# Patient Record
Sex: FEMALE | Race: BLACK OR AFRICAN AMERICAN | HISPANIC OR LATINO | Employment: UNEMPLOYED | URBAN - METROPOLITAN AREA
[De-identification: names, ages, dates, MRNs, and addresses within clinical notes are randomized per-mention and may not be internally consistent; named-entity substitution may affect disease eponyms.]

---

## 2017-05-16 ENCOUNTER — APPOINTMENT (OUTPATIENT)
Dept: PHYSICAL THERAPY | Facility: REHABILITATION | Age: 27
End: 2017-05-16
Payer: COMMERCIAL

## 2017-05-16 PROCEDURE — 97110 THERAPEUTIC EXERCISES: CPT

## 2017-05-16 PROCEDURE — 97162 PT EVAL MOD COMPLEX 30 MIN: CPT

## 2017-05-22 ENCOUNTER — HOSPITAL ENCOUNTER (OUTPATIENT)
Dept: RADIOLOGY | Facility: HOSPITAL | Age: 27
Discharge: HOME | End: 2017-05-22
Attending: PODIATRIST
Payer: COMMERCIAL

## 2017-05-22 ENCOUNTER — APPOINTMENT (OUTPATIENT)
Dept: PHYSICAL THERAPY | Facility: REHABILITATION | Age: 27
End: 2017-05-22
Payer: COMMERCIAL

## 2017-05-22 ENCOUNTER — TRANSCRIBE ORDERS (OUTPATIENT)
Dept: ADMINISTRATIVE | Facility: HOSPITAL | Age: 27
End: 2017-05-22

## 2017-05-22 DIAGNOSIS — M79.674 PAIN IN TOE OF RIGHT FOOT: ICD-10-CM

## 2017-05-22 DIAGNOSIS — M79.674 PAIN IN TOE OF RIGHT FOOT: Primary | ICD-10-CM

## 2017-05-22 PROCEDURE — 73630 X-RAY EXAM OF FOOT: CPT

## 2017-05-22 PROCEDURE — 73660 X-RAY EXAM OF TOE(S): CPT

## 2017-05-25 ENCOUNTER — APPOINTMENT (OUTPATIENT)
Dept: PHYSICAL THERAPY | Facility: REHABILITATION | Age: 27
End: 2017-05-25
Payer: COMMERCIAL

## 2017-05-30 ENCOUNTER — APPOINTMENT (OUTPATIENT)
Dept: PHYSICAL THERAPY | Facility: REHABILITATION | Age: 27
End: 2017-05-30
Payer: COMMERCIAL

## 2018-03-15 ENCOUNTER — OFFICE VISIT (OUTPATIENT)
Dept: OBGYN CLINIC | Facility: CLINIC | Age: 28
End: 2018-03-15
Payer: COMMERCIAL

## 2018-03-15 VITALS
HEIGHT: 62 IN | WEIGHT: 180 LBS | DIASTOLIC BLOOD PRESSURE: 78 MMHG | BODY MASS INDEX: 33.13 KG/M2 | SYSTOLIC BLOOD PRESSURE: 122 MMHG

## 2018-03-15 DIAGNOSIS — E04.9 ENLARGED THYROID: ICD-10-CM

## 2018-03-15 DIAGNOSIS — Z11.3 SCREENING EXAMINATION FOR VENEREAL DISEASE: ICD-10-CM

## 2018-03-15 DIAGNOSIS — N92.6 IRREGULAR MENSES: ICD-10-CM

## 2018-03-15 DIAGNOSIS — Z01.419 ENCOUNTER FOR GYNECOLOGICAL EXAMINATION: Primary | ICD-10-CM

## 2018-03-15 PROCEDURE — 99395 PREV VISIT EST AGE 18-39: CPT | Performed by: PHYSICIAN ASSISTANT

## 2018-03-15 RX ORDER — CLONAZEPAM 1 MG/1
1 TABLET ORAL 3 TIMES DAILY
Refills: 0 | COMMUNITY
Start: 2018-02-06 | End: 2019-08-01

## 2018-03-15 RX ORDER — LITHIUM CARBONATE 300 MG/1
300 CAPSULE ORAL 3 TIMES DAILY
Refills: 0 | COMMUNITY
Start: 2018-02-06 | End: 2019-08-01

## 2018-03-15 RX ORDER — ALBUTEROL SULFATE 2.5 MG/3ML
SOLUTION RESPIRATORY (INHALATION)
Refills: 0 | COMMUNITY
Start: 2018-02-20

## 2018-03-15 RX ORDER — NORETHINDRONE ACETATE AND ETHINYL ESTRADIOL 1MG-20(21)
1 KIT ORAL DAILY
Qty: 28 TABLET | Refills: 3 | Status: SHIPPED | OUTPATIENT
Start: 2018-03-15 | End: 2018-09-04 | Stop reason: SDUPTHER

## 2018-03-15 NOTE — PROGRESS NOTES
Assessment/Plan:       Problem List Items Addressed This Visit     Enlarged thyroid     Script for thyroid ultrasound given  Relevant Orders    US thyroid    Encounter for gynecological examination - Primary     Pap guidelines reviewed  PAP with reflex and STD testing done today  Script for HIV, Hep B & C and RPR given  Return to office in 3 months for pill check  Relevant Orders    Thinprep Tis Pap Reflex HPV mRNA E6/E7, Chlamydia/N gonorrhoeae    Irregular menses     Reviewed options with patient for irregular bleeding  Recommend switching to pill in which she gets menses monthly  Reviewed birth control options including OCP, NuvaRing, Patch, Depo, Nexplanon  Patient decided on OCP  Reviewed when to start, what to do if misses pill  Recommend using condoms for the 1st month on the pill, if misses more than 2 pills in the pack, if on antibiotics and for STD prevention  Reviewed common side effects of the pill including nausea, vomiting, breast pain, bloating, fatigue, mood swings, weight gain, and increased acne  Reassured side effects typically diminish in the first month or two on the pill  Reviewed clotting risk and signs and symptoms of pulmonary embolism, DVT, myocardial infarction, stroke  Will trial Loestrin Fe 1/20  Return to office in 3 months for pill check  Relevant Medications    norethindrone-ethinyl estradiol (JUNEL FE 1/20) 1-20 MG-MCG per tablet      Other Visit Diagnoses     Screening examination for venereal disease        Relevant Orders    HIV 1/2 AG-AB combo    Hepatitis C antibody    Hepatitis B surface antigen    RPR    Thinprep Tis Pap Reflex HPV mRNA E6/E7, Chlamydia/N gonorrhoeae            Subjective:      Patient ID: Manasa Wong is a 33 y o  y o  female  HPI  34 yo seen for annual exam  Currently on Ashlyna 91 day OCP reports menses very irregular  Has bleeding about 45 days in that lasts 5-20 days and very heavy at times   Having irregular spotting throughout  Reports also more nauseated on this pill  Denies headaches, mood swings  Denies bowel or bladder issues  Prior to starting this pill patient was on Mirena but was worsening her anxiety  Reports anxiety better on this pill but cannot tolerate the bleeding  Reports thyroid and blood count tested recently by PCP  TFTs normal and patient reports anemic  Last pap: 11/2014 NILM   Monogamous with same partner, would like STD testing today  The following portions of the patient's history were reviewed and updated as appropriate:   She  has no past medical history on file  She   Patient Active Problem List    Diagnosis Date Noted    Enlarged thyroid 03/15/2018    Encounter for gynecological examination 03/15/2018    Irregular menses 03/15/2018    Acute pelvic pain, female 12/04/2015    Asthma 10/21/2015    Uterine fibroid in antepartum period 10/24/2014     She  has a past surgical history that includes Tooth extraction (2005)  Her family history includes Diabetes in her father and paternal grandfather; Liver cancer in her maternal grandmother  She  reports that she has never smoked  She has never used smokeless tobacco  She reports that she drinks alcohol  She reports that she does not use drugs    Current Outpatient Prescriptions   Medication Sig Dispense Refill    albuterol (2 5 mg/3 mL) 0 083 % nebulizer solution inhale contents of 1 vial in nebulizer every 4 hours if needed for wheezing  0    Calcium Carb-Cholecalciferol (CALCIUM + D3) 600-200 MG-UNIT TABS Take 1 tablet by mouth 2 (two) times a day  0    clonazePAM (KlonoPIN) 1 mg tablet Take 1 mg by mouth 3 (three) times a day  0    lithium carbonate 300 mg capsule Take 300 mg by mouth 3 (three) times a day  0    norethindrone-ethinyl estradiol (JUNEL FE 1/20) 1-20 MG-MCG per tablet Take 1 tablet by mouth daily 28 tablet 3    VENTOLIN  (90 Base) MCG/ACT inhaler inhale 2 puff by inhalation route  every 8 hours as needed  0     No current facility-administered medications for this visit  She is allergic to pollen extract       Menstrual History:  OB History      Para Term  AB Living    2 1 1   1 1    SAB TAB Ectopic Multiple Live Births    1       1        Obstetric Comments    MENARCHE- AGE 14         Menarche age: 14  Patient's last menstrual period was 2018 (exact date)  Period Duration (Days): 5-20  Period Pattern: (!) Irregular  Menstrual Flow: Moderate, Heavy, Light  Dysmenorrhea: None    Review of Systems   Constitutional: Negative for fatigue, fever and unexpected weight change  HENT: Negative for dental problem and sinus pressure  Eyes: Negative for visual disturbance  Respiratory: Negative for cough, shortness of breath and wheezing  Cardiovascular: Negative for chest pain  Gastrointestinal: Negative for abdominal pain, blood in stool, constipation, diarrhea, nausea and vomiting  Endocrine: Negative for polydipsia  Genitourinary: Negative for difficulty urinating, dyspareunia, dysuria, frequency, hematuria, pelvic pain and urgency  Musculoskeletal: Negative for arthralgias and back pain  Neurological: Negative for dizziness, seizures, light-headedness and headaches  Psychiatric/Behavioral: Negative for suicidal ideas  The patient is not nervous/anxious  Objective:     Physical Exam   Constitutional: She is oriented to person, place, and time  She appears well-developed and well-nourished  Genitourinary: Vagina normal and uterus normal  There is no rash, tenderness, lesion, injury or Bartholin's cyst on the right labia  There is no rash, tenderness, lesion, injury or Bartholin's cyst on the left labia  Vagina exhibits no lesion  No erythema, tenderness or bleeding in the vagina  No signs of injury around the vagina  No vaginal discharge found  Right adnexum does not display mass, does not display tenderness and does not display fullness   Left adnexum does not display mass, does not display tenderness and does not display fullness  Cervix does not exhibit motion tenderness, lesion or discharge  Uterus is not enlarged, tender, exhibiting a mass, irregular (is regular) or mobile  HENT:   Head: Normocephalic and atraumatic  Neck: Thyromegaly (diffuse) present  Cardiovascular: Normal rate, regular rhythm and normal heart sounds  Exam reveals no gallop and no friction rub  No murmur heard  Pulmonary/Chest: Effort normal and breath sounds normal  No respiratory distress  She has no wheezes  Right breast exhibits no inverted nipple, no mass, no nipple discharge, no skin change and no tenderness  Left breast exhibits no inverted nipple, no mass, no nipple discharge, no skin change and no tenderness  Breasts are symmetrical  There is no breast swelling  Abdominal: Soft  She exhibits no distension and no mass  There is no tenderness  There is no rebound and no guarding  No hernia  Lymphadenopathy:     She has no cervical adenopathy  Right: No inguinal adenopathy present  Left: No inguinal adenopathy present  Neurological: She is alert and oriented to person, place, and time  Skin: Skin is warm and dry  Psychiatric: She has a normal mood and affect   Her behavior is normal

## 2018-03-15 NOTE — ASSESSMENT & PLAN NOTE
Pap guidelines reviewed  PAP with reflex and STD testing done today  Script for HIV, Hep B & C and RPR given  Return to office in 3 months for pill check

## 2018-03-15 NOTE — ASSESSMENT & PLAN NOTE
Reviewed options with patient for irregular bleeding  Recommend switching to pill in which she gets menses monthly  Reviewed birth control options including OCP, NuvaRing, Patch, Depo, Nexplanon  Patient decided on OCP  Reviewed when to start, what to do if misses pill  Recommend using condoms for the 1st month on the pill, if misses more than 2 pills in the pack, if on antibiotics and for STD prevention  Reviewed common side effects of the pill including nausea, vomiting, breast pain, bloating, fatigue, mood swings, weight gain, and increased acne  Reassured side effects typically diminish in the first month or two on the pill  Reviewed clotting risk and signs and symptoms of pulmonary embolism, DVT, myocardial infarction, stroke  Will trial Loestrin Fe 1/20  Return to office in 3 months for pill check

## 2018-03-19 LAB
C TRACH RRNA SPEC QL NAA+PROBE: NOT DETECTED
CLINICAL INFO: NORMAL
CYTO CVX: NORMAL
CYTOLOGY CMNT CVX/VAG CYTO-IMP: NORMAL
DATE PREVIOUS BX: NORMAL
LMP START DATE: NORMAL
N GONORRHOEA RRNA SPEC QL NAA+PROBE: NOT DETECTED
SL AMB PREV. PAP:: NORMAL
SPECIMEN SOURCE CVX/VAG CYTO: NORMAL

## 2018-04-17 ENCOUNTER — HOSPITAL ENCOUNTER (OUTPATIENT)
Dept: ULTRASOUND IMAGING | Facility: HOSPITAL | Age: 28
Discharge: HOME | End: 2018-04-17
Attending: PHYSICIAN ASSISTANT
Payer: COMMERCIAL

## 2018-04-17 DIAGNOSIS — E04.9 ENLARGED THYROID: ICD-10-CM

## 2018-04-17 PROCEDURE — 76536 US EXAM OF HEAD AND NECK: CPT

## 2018-09-04 DIAGNOSIS — N92.6 IRREGULAR MENSES: ICD-10-CM

## 2018-09-04 NOTE — TELEPHONE ENCOUNTER
Patient no showed to 3 month pill check  If doing well and had recent BP record measurement and will refill

## 2018-09-05 RX ORDER — NORETHINDRONE ACETATE AND ETHINYL ESTRADIOL AND FERROUS FUMARATE 1MG-20(21)
1 KIT ORAL DAILY
Qty: 90 TABLET | Refills: 2 | Status: SHIPPED | OUTPATIENT
Start: 2018-09-05 | End: 2019-05-19 | Stop reason: SDUPTHER

## 2019-05-19 DIAGNOSIS — N92.6 IRREGULAR MENSES: ICD-10-CM

## 2019-05-20 RX ORDER — NORETHINDRONE ACETATE AND ETHINYL ESTRADIOL AND FERROUS FUMARATE 1MG-20(21)
KIT ORAL
Qty: 84 TABLET | Refills: 0 | Status: SHIPPED | OUTPATIENT
Start: 2019-05-20 | End: 2019-08-01 | Stop reason: SDUPTHER

## 2019-06-21 ENCOUNTER — TELEPHONE (OUTPATIENT)
Dept: OBGYN CLINIC | Facility: CLINIC | Age: 29
End: 2019-06-21

## 2019-08-01 ENCOUNTER — ANNUAL EXAM (OUTPATIENT)
Dept: OBGYN CLINIC | Facility: CLINIC | Age: 29
End: 2019-08-01
Payer: COMMERCIAL

## 2019-08-01 VITALS
BODY MASS INDEX: 35.85 KG/M2 | SYSTOLIC BLOOD PRESSURE: 118 MMHG | DIASTOLIC BLOOD PRESSURE: 70 MMHG | WEIGHT: 194.8 LBS | HEIGHT: 62 IN

## 2019-08-01 DIAGNOSIS — Z01.419 ENCOUNTER FOR GYNECOLOGICAL EXAMINATION WITHOUT ABNORMAL FINDING: Primary | ICD-10-CM

## 2019-08-01 DIAGNOSIS — N92.6 IRREGULAR MENSES: ICD-10-CM

## 2019-08-01 DIAGNOSIS — Z11.3 SCREENING EXAMINATION FOR VENEREAL DISEASE: ICD-10-CM

## 2019-08-01 PROCEDURE — 99395 PREV VISIT EST AGE 18-39: CPT | Performed by: PHYSICIAN ASSISTANT

## 2019-08-01 RX ORDER — EPINEPHRINE 0.3 MG/.3ML
INJECTION SUBCUTANEOUS
Refills: 0 | COMMUNITY
Start: 2019-05-23

## 2019-08-01 RX ORDER — FAMOTIDINE 20 MG/1
20 TABLET, FILM COATED ORAL
Refills: 0 | COMMUNITY
Start: 2019-05-17 | End: 2019-08-01

## 2019-08-01 RX ORDER — NORETHINDRONE ACETATE AND ETHINYL ESTRADIOL 1MG-20(21)
1 KIT ORAL DAILY
Qty: 84 TABLET | Refills: 3 | Status: SHIPPED | OUTPATIENT
Start: 2019-08-01 | End: 2020-06-29 | Stop reason: SDUPTHER

## 2019-08-01 RX ORDER — TOPIRAMATE 25 MG/1
CAPSULE, COATED PELLETS ORAL
Refills: 0 | COMMUNITY
Start: 2019-04-25 | End: 2019-08-01

## 2019-08-01 RX ORDER — HYDROXYZINE HYDROCHLORIDE 25 MG/1
TABLET, FILM COATED ORAL
Refills: 0 | COMMUNITY
Start: 2019-05-17 | End: 2019-08-01

## 2019-08-01 NOTE — PROGRESS NOTES
Assessment/Plan   Problem List Items Addressed This Visit        Other    Irregular menses    Relevant Medications    norethindrone-ethinyl estradiol (JUNEL FE 1/20) 1-20 MG-MCG per tablet    Encounter for gynecological examination without abnormal finding - Primary     Pap guidelines reviewed  Pap with HPV done today  STD cultures done today  Script for STD blood work given  Will plan to continue Junel 1/20  Return to office for annual or as needed  Relevant Orders    Thinprep Tis Pap, HPV mRNA E6/E7 Rfx HPV 16,18/45, Chlamydia/N gonorrhoeae      Other Visit Diagnoses     Screening examination for venereal disease        Relevant Orders    Thinprep Tis Pap, HPV mRNA E6/E7 Rfx HPV 16,18/45, Chlamydia/N gonorrhoeae    Hepatitis B surface antigen    HIV 1/2 AG-AB combo    Hepatitis C antibody    RPR          Subjective:     Patient ID: Hector Akhtar is a 26 y o  y o  female  HPI  25 yo seen for annual exam  Currently on Junel 1/20 tolerating well would like to continue  Denies bowel or bladder issues  Last pap: 3/15/2018 NILM   The following portions of the patient's history were reviewed and updated as appropriate:   She  has no past medical history on file  She   Patient Active Problem List    Diagnosis Date Noted    Encounter for gynecological examination without abnormal finding 08/01/2019    Enlarged thyroid 03/15/2018    Encounter for gynecological examination 03/15/2018    Irregular menses 03/15/2018    Acute pelvic pain, female 12/04/2015    Asthma 10/21/2015    Uterine fibroid in antepartum period 10/24/2014     She  has a past surgical history that includes Tooth extraction (2005)  Her family history includes Diabetes in her father and paternal grandfather; Liver cancer in her maternal grandmother  She  reports that she has never smoked  She has never used smokeless tobacco  She reports that she drinks alcohol  She reports that she does not use drugs    Current Outpatient Medications   Medication Sig Dispense Refill    albuterol (2 5 mg/3 mL) 0 083 % nebulizer solution inhale contents of 1 vial in nebulizer every 4 hours if needed for wheezing  0    EPINEPHrine (EPIPEN) 0 3 mg/0 3 mL SOAJ USE ASE DIRECTED BY MD  0    norethindrone-ethinyl estradiol (JUNEL FE 1/20) 1-20 MG-MCG per tablet Take 1 tablet by mouth daily 84 tablet 3    VENTOLIN  (90 Base) MCG/ACT inhaler inhale 2 puff by inhalation route  every 8 hours as needed  0     No current facility-administered medications for this visit  She is allergic to grapeseed extract [fish oil] and pollen extract       Menstrual History:  OB History        2    Para   1    Term   1            AB   1    Living   1       SAB   1    TAB        Ectopic        Multiple        Live Births   1           Obstetric Comments   MENARCHE- AGE 14            Menarche age: 14  Patient's last menstrual period was 2019 (exact date)  Period Cycle (Days): 28  Period Duration (Days): 3-7  Period Pattern: Regular  Menstrual Flow: Moderate, Light  Dysmenorrhea: None      Review of Systems   Constitutional: Negative for fatigue, fever and unexpected weight change  HENT: Negative for dental problem and sinus pressure  Eyes: Negative for visual disturbance  Respiratory: Negative for cough, shortness of breath and wheezing  Cardiovascular: Negative for chest pain  Gastrointestinal: Negative for abdominal pain, blood in stool, constipation, diarrhea, nausea and vomiting  Endocrine: Negative for polydipsia  Genitourinary: Negative for difficulty urinating, dyspareunia, dysuria, frequency, hematuria, pelvic pain and urgency  Musculoskeletal: Negative for arthralgias and back pain  Neurological: Negative for dizziness, seizures, light-headedness and headaches  Psychiatric/Behavioral: Negative for suicidal ideas  The patient is not nervous/anxious          Objective:  Vitals:    19 1537   BP: 118/70   BP Location: Left arm   Patient Position: Sitting   Cuff Size: Large   Weight: 88 4 kg (194 lb 12 8 oz)   Height: 5' 2" (1 575 m)      Physical Exam   Constitutional: She is oriented to person, place, and time  She appears well-developed and well-nourished  Genitourinary: Vagina normal and uterus normal  There is no rash, tenderness, lesion, injury or Bartholin's cyst on the right labia  There is no rash, tenderness, lesion, injury or Bartholin's cyst on the left labia  Vagina exhibits no lesion  No erythema, tenderness or bleeding in the vagina  No signs of injury around the vagina  No vaginal discharge found  Right adnexum does not display mass, does not display tenderness and does not display fullness  Left adnexum does not display mass, does not display tenderness and does not display fullness  Cervix does not exhibit motion tenderness, lesion or discharge  Uterus is not enlarged, tender, exhibiting a mass, irregular (is regular) or mobile  HENT:   Head: Normocephalic and atraumatic  Neck: No thyromegaly present  Cardiovascular: Normal rate, regular rhythm and normal heart sounds  Exam reveals no gallop and no friction rub  No murmur heard  Pulmonary/Chest: Effort normal and breath sounds normal  No respiratory distress  She has no wheezes  Right breast exhibits no inverted nipple, no mass, no nipple discharge, no skin change and no tenderness  Left breast exhibits no inverted nipple, no mass, no nipple discharge, no skin change and no tenderness  No breast swelling  Breasts are symmetrical    Abdominal: Soft  She exhibits no distension and no mass  There is no tenderness  There is no rebound and no guarding  No hernia  Lymphadenopathy:     She has no cervical adenopathy  Right: No inguinal adenopathy present  Left: No inguinal adenopathy present  Neurological: She is alert and oriented to person, place, and time  Skin: Skin is warm and dry     Psychiatric: She has a normal mood and affect   Her behavior is normal

## 2019-08-01 NOTE — ASSESSMENT & PLAN NOTE
Pap guidelines reviewed  Pap with HPV done today  STD cultures done today  Script for STD blood work given  Will plan to continue Junel 1/20  Return to office for annual or as needed

## 2019-08-06 LAB
C TRACH RRNA SPEC QL NAA+PROBE: NOT DETECTED
CLINICAL INFO: NORMAL
CYTO CVX: NORMAL
CYTOLOGY CMNT CVX/VAG CYTO-IMP: NORMAL
DATE PREVIOUS BX: NORMAL
HPV E6+E7 MRNA CVX QL NAA+PROBE: NOT DETECTED
LMP START DATE: NORMAL
N GONORRHOEA RRNA SPEC QL NAA+PROBE: NOT DETECTED
SL AMB PREV. PAP:: NORMAL
SPECIMEN SOURCE CVX/VAG CYTO: NORMAL

## 2020-06-29 ENCOUNTER — TELEPHONE (OUTPATIENT)
Dept: OBGYN CLINIC | Facility: CLINIC | Age: 30
End: 2020-06-29

## 2020-06-29 DIAGNOSIS — N92.6 IRREGULAR MENSES: ICD-10-CM

## 2020-06-29 RX ORDER — NORETHINDRONE ACETATE AND ETHINYL ESTRADIOL 1MG-20(21)
1 KIT ORAL DAILY
Qty: 84 TABLET | Refills: 0 | Status: SHIPPED | OUTPATIENT
Start: 2020-06-29 | End: 2020-10-06

## 2020-09-02 ENCOUNTER — ANNUAL EXAM (OUTPATIENT)
Dept: OBGYN CLINIC | Facility: CLINIC | Age: 30
End: 2020-09-02
Payer: COMMERCIAL

## 2020-09-02 VITALS
BODY MASS INDEX: 36.8 KG/M2 | WEIGHT: 200 LBS | DIASTOLIC BLOOD PRESSURE: 72 MMHG | HEIGHT: 62 IN | TEMPERATURE: 97.7 F | SYSTOLIC BLOOD PRESSURE: 120 MMHG

## 2020-09-02 DIAGNOSIS — Z01.419 ENCOUNTER FOR GYNECOLOGICAL EXAMINATION WITHOUT ABNORMAL FINDING: Primary | ICD-10-CM

## 2020-09-02 DIAGNOSIS — N92.6 IRREGULAR MENSES: ICD-10-CM

## 2020-09-02 DIAGNOSIS — Z13.220 SCREENING FOR CHOLESTEROL LEVEL: ICD-10-CM

## 2020-09-02 DIAGNOSIS — R63.5 WEIGHT GAIN: ICD-10-CM

## 2020-09-02 DIAGNOSIS — R53.83 FATIGUE, UNSPECIFIED TYPE: ICD-10-CM

## 2020-09-02 PROCEDURE — 99395 PREV VISIT EST AGE 18-39: CPT | Performed by: PHYSICIAN ASSISTANT

## 2020-09-02 NOTE — ASSESSMENT & PLAN NOTE
Pap guidelines reviewed  Pap deferred secondary to negative pap and HPV in 2019 in this low risk patient  Reviewed irregular cycles in length with patient  Will plan to get blood work to further evaluate  If blood work normal will plan to switch to Cryselle OCP to attempt to better regulate cycles  Office will call with results and appropriate follow-up  Return to office for annual or as needed

## 2020-09-02 NOTE — PROGRESS NOTES
Assessment/Plan   Problem List Items Addressed This Visit        Other    Irregular menses    Relevant Orders    T4, free    TSH, 3rd generation    CBC and differential    Comprehensive metabolic panel    Hemoglobin A1C    Insulin, fasting    Encounter for gynecological examination without abnormal finding - Primary     Pap guidelines reviewed  Pap deferred secondary to negative pap and HPV in 2019 in this low risk patient  Reviewed irregular cycles in length with patient  Will plan to get blood work to further evaluate  If blood work normal will plan to switch to Cryselle OCP to attempt to better regulate cycles  Office will call with results and appropriate follow-up  Return to office for annual or as needed  Other Visit Diagnoses     Weight gain        Relevant Orders    T4, free    TSH, 3rd generation    CBC and differential    Comprehensive metabolic panel    Hemoglobin A1C    Insulin, fasting    Lipid panel    Fatigue, unspecified type        Relevant Orders    T4, free    TSH, 3rd generation    CBC and differential    Comprehensive metabolic panel    Screening for cholesterol level        Relevant Orders    Lipid panel          Subjective:     Patient ID: Giselle Scott is a 31 y o  y o  female  HPI  39-year-old seen for annual exam   Patient currently on Junel 1/20, menses have been irregular over the past few months, reports one month only had a day of bleeding and then the next month bled for 14 days straight  Has also had menses twice in one month, one during blank week and one at another time  Reports takes pill at the same time every day, is never late on a pill or misses a pill  Denies bowel or bladder issues  Has been on a no carb diet for 6 5 weeks and has only lost about 4 5 lbs  Does not understand with how strictly she has been following the diet why she is not losing weight  Patient reports feeling more fatigued as well     Patient has visible exophthalmos and appearance of enlarged thyroid  Thyroid blood work always normal and thyroid ultrasound done on 2018 was normal    Last pap: 2019 NILM (-)HRHPV  The following portions of the patient's history were reviewed and updated as appropriate:   She  has no past medical history on file  She   Patient Active Problem List    Diagnosis Date Noted    Encounter for gynecological examination without abnormal finding 2019    Enlarged thyroid 03/15/2018    Encounter for gynecological examination 03/15/2018    Irregular menses 03/15/2018    Acute pelvic pain, female 2015    Asthma 10/21/2015    Uterine fibroid in antepartum period 10/24/2014     She  has a past surgical history that includes Tooth extraction ()  Her family history includes Diabetes in her father and paternal grandfather; Liver cancer in her maternal grandmother  She  reports that she has never smoked  She has never used smokeless tobacco  She reports current alcohol use  She reports that she does not use drugs  Current Outpatient Medications   Medication Sig Dispense Refill    albuterol (2 5 mg/3 mL) 0 083 % nebulizer solution inhale contents of 1 vial in nebulizer every 4 hours if needed for wheezing  0    EPINEPHrine (EPIPEN) 0 3 mg/0 3 mL SOAJ USE ASE DIRECTED BY MD  0    norethindrone-ethinyl estradiol (Junel ) 1-20 MG-MCG per tablet Take 1 tablet by mouth daily 84 tablet 0    VENTOLIN  (90 Base) MCG/ACT inhaler inhale 2 puff by inhalation route  every 8 hours as needed  0     No current facility-administered medications for this visit  She is allergic to grapeseed extract [fish oil] and pollen extract       Menstrual History:  OB History        2    Para   1    Term   1            AB   1    Living   1       SAB   1    TAB        Ectopic        Multiple        Live Births   1           Obstetric Comments   MENARCHE- AGE 11              Patient's last menstrual period was 2020 (exact date)  Review of Systems   Constitutional: Negative for fatigue, fever and unexpected weight change  HENT: Negative for dental problem and sinus pressure  Eyes: Negative for visual disturbance  Respiratory: Negative for cough, shortness of breath and wheezing  Cardiovascular: Negative for chest pain  Gastrointestinal: Negative for abdominal pain, blood in stool, constipation, diarrhea, nausea and vomiting  Endocrine: Negative for polydipsia  Genitourinary: Negative for difficulty urinating, dyspareunia, dysuria, frequency, hematuria, pelvic pain and urgency  Musculoskeletal: Negative for arthralgias and back pain  Neurological: Negative for dizziness, seizures, light-headedness and headaches  Psychiatric/Behavioral: Negative for suicidal ideas  The patient is not nervous/anxious  Objective:  Vitals:    09/02/20 1631   BP: 120/72   BP Location: Left arm   Patient Position: Sitting   Cuff Size: Standard   Temp: 97 7 °F (36 5 °C)   Weight: 90 7 kg (200 lb)   Height: 5' 2" (1 575 m)      Physical Exam  Constitutional:       Appearance: Normal appearance  She is well-developed  Genitourinary:      Vulva, urethra, bladder, vagina and uterus normal       No vulval condylomata, lesion, tenderness, ulcerations, Bartholin's cyst or rash noted  No signs of labial injury  No labial fusion  No inguinal adenopathy present in the right or left side  No urethral prolapse, pain, swelling, tenderness, caruncle, mass or diverticulum present  Bladder is not distended or tender  No signs of injury or lesions in the vagina  No vaginal discharge, erythema, tenderness or bleeding  No cervical motion tenderness, discharge or lesion  Uterus is not enlarged, tender, irregular or mobile  No uterine mass detected  No right or left adnexal mass present  Right adnexa not tender or full  Left adnexa not tender or full     HENT:      Head: Normocephalic and atraumatic  Neck:      Thyroid: No thyromegaly  Cardiovascular:      Rate and Rhythm: Normal rate and regular rhythm  Heart sounds: Normal heart sounds  No murmur  No friction rub  No gallop  Pulmonary:      Effort: Pulmonary effort is normal  No respiratory distress  Breath sounds: Normal breath sounds  No wheezing  Chest:      Breasts: Breasts are symmetrical          Right: Normal  No swelling, bleeding, inverted nipple, mass, nipple discharge, skin change or tenderness  Left: Normal  No swelling, bleeding, inverted nipple, mass, nipple discharge, skin change or tenderness  Abdominal:      General: There is no distension  Palpations: Abdomen is soft  There is no mass  Tenderness: There is no abdominal tenderness  There is no guarding or rebound  Hernia: No hernia is present  Lymphadenopathy:      Cervical: No cervical adenopathy  Upper Body:      Right upper body: No supraclavicular, axillary or pectoral adenopathy  Left upper body: No supraclavicular, axillary or pectoral adenopathy  Lower Body: No right inguinal adenopathy  No left inguinal adenopathy  Neurological:      Mental Status: She is alert and oriented to person, place, and time  Skin:     General: Skin is warm and dry     Psychiatric:         Behavior: Behavior normal

## 2020-09-18 LAB
ALBUMIN SERPL-MCNC: 4.1 G/DL (ref 3.6–5.1)
ALBUMIN/GLOB SERPL: 1.3 (CALC) (ref 1–2.5)
ALP SERPL-CCNC: 100 U/L (ref 31–125)
ALT SERPL-CCNC: 10 U/L (ref 6–29)
AST SERPL-CCNC: 11 U/L (ref 10–30)
BASOPHILS # BLD AUTO: 19 CELLS/UL (ref 0–200)
BASOPHILS NFR BLD AUTO: 0.2 %
BILIRUB SERPL-MCNC: 0.4 MG/DL (ref 0.2–1.2)
BUN SERPL-MCNC: 12 MG/DL (ref 7–25)
BUN/CREAT SERPL: NORMAL (CALC) (ref 6–22)
CALCIUM SERPL-MCNC: 9 MG/DL (ref 8.6–10.2)
CHLORIDE SERPL-SCNC: 103 MMOL/L (ref 98–110)
CHOLEST SERPL-MCNC: 163 MG/DL
CHOLEST/HDLC SERPL: 3.1 (CALC)
CO2 SERPL-SCNC: 25 MMOL/L (ref 20–32)
CREAT SERPL-MCNC: 0.82 MG/DL (ref 0.5–1.1)
EOSINOPHIL # BLD AUTO: 171 CELLS/UL (ref 15–500)
EOSINOPHIL NFR BLD AUTO: 1.8 %
ERYTHROCYTE [DISTWIDTH] IN BLOOD BY AUTOMATED COUNT: 12.3 % (ref 11–15)
GLOBULIN SER CALC-MCNC: 3.1 G/DL (CALC) (ref 1.9–3.7)
GLUCOSE SERPL-MCNC: 88 MG/DL (ref 65–99)
HBA1C MFR BLD: 5.1 % OF TOTAL HGB
HCT VFR BLD AUTO: 40 % (ref 35–45)
HDLC SERPL-MCNC: 53 MG/DL
HGB BLD-MCNC: 13.2 G/DL (ref 11.7–15.5)
INSULIN SERPL-ACNC: 9.7 UIU/ML
LDLC SERPL CALC-MCNC: 85 MG/DL (CALC)
LYMPHOCYTES # BLD AUTO: 3154 CELLS/UL (ref 850–3900)
LYMPHOCYTES NFR BLD AUTO: 33.2 %
MCH RBC QN AUTO: 29.8 PG (ref 27–33)
MCHC RBC AUTO-ENTMCNC: 33 G/DL (ref 32–36)
MCV RBC AUTO: 90.3 FL (ref 80–100)
MONOCYTES # BLD AUTO: 523 CELLS/UL (ref 200–950)
MONOCYTES NFR BLD AUTO: 5.5 %
NEUTROPHILS # BLD AUTO: 5634 CELLS/UL (ref 1500–7800)
NEUTROPHILS NFR BLD AUTO: 59.3 %
NONHDLC SERPL-MCNC: 110 MG/DL (CALC)
PLATELET # BLD AUTO: 203 THOUSAND/UL (ref 140–400)
PMV BLD REES-ECKER: 11 FL (ref 7.5–12.5)
POTASSIUM SERPL-SCNC: 4 MMOL/L (ref 3.5–5.3)
PROT SERPL-MCNC: 7.2 G/DL (ref 6.1–8.1)
RBC # BLD AUTO: 4.43 MILLION/UL (ref 3.8–5.1)
SL AMB EGFR AFRICAN AMERICAN: 111 ML/MIN/1.73M2
SL AMB EGFR NON AFRICAN AMERICAN: 95 ML/MIN/1.73M2
SODIUM SERPL-SCNC: 138 MMOL/L (ref 135–146)
T4 FREE SERPL-MCNC: 1.2 NG/DL (ref 0.8–1.8)
TRIGL SERPL-MCNC: 149 MG/DL
TSH SERPL-ACNC: 1.82 MIU/L
WBC # BLD AUTO: 9.5 THOUSAND/UL (ref 3.8–10.8)

## 2020-09-22 DIAGNOSIS — N92.6 IRREGULAR MENSES: Primary | ICD-10-CM

## 2020-10-06 DIAGNOSIS — N92.6 IRREGULAR MENSES: ICD-10-CM

## 2020-10-06 RX ORDER — NORETHINDRONE ACETATE/ETHINYL ESTRADIOL AND FERROUS FUMARATE 1MG-20(21)
KIT ORAL
Qty: 84 TABLET | Refills: 0 | Status: SHIPPED | OUTPATIENT
Start: 2020-10-06 | End: 2021-01-06

## 2021-01-06 DIAGNOSIS — N92.6 IRREGULAR MENSES: ICD-10-CM

## 2021-01-06 RX ORDER — NORETHINDRONE ACETATE/ETHINYL ESTRADIOL AND FERROUS FUMARATE 1MG-20(21)
KIT ORAL
Qty: 84 TABLET | Refills: 0 | Status: SHIPPED | OUTPATIENT
Start: 2021-01-06 | End: 2021-03-29

## 2021-03-28 DIAGNOSIS — N92.6 IRREGULAR MENSES: ICD-10-CM

## 2021-03-29 RX ORDER — NORETHINDRONE ACETATE/ETHINYL ESTRADIOL AND FERROUS FUMARATE 1MG-20(21)
KIT ORAL
Qty: 84 TABLET | Refills: 0 | Status: SHIPPED | OUTPATIENT
Start: 2021-03-29 | End: 2021-06-30

## 2022-02-08 ENCOUNTER — ANNUAL EXAM (OUTPATIENT)
Dept: OBGYN CLINIC | Facility: CLINIC | Age: 32
End: 2022-02-08
Payer: COMMERCIAL

## 2022-02-08 VITALS
SYSTOLIC BLOOD PRESSURE: 120 MMHG | WEIGHT: 203 LBS | DIASTOLIC BLOOD PRESSURE: 80 MMHG | BODY MASS INDEX: 37.36 KG/M2 | HEIGHT: 62 IN

## 2022-02-08 DIAGNOSIS — L90.0 LICHEN SCLEROSUS: ICD-10-CM

## 2022-02-08 DIAGNOSIS — N94.6 DYSMENORRHEA: ICD-10-CM

## 2022-02-08 DIAGNOSIS — Z01.419 GYNECOLOGIC EXAM NORMAL: Primary | ICD-10-CM

## 2022-02-08 DIAGNOSIS — R63.5 WEIGHT GAIN: ICD-10-CM

## 2022-02-08 DIAGNOSIS — N92.6 IRREGULAR MENSES: ICD-10-CM

## 2022-02-08 PROCEDURE — 99395 PREV VISIT EST AGE 18-39: CPT | Performed by: PHYSICIAN ASSISTANT

## 2022-02-08 PROCEDURE — 0503F POSTPARTUM CARE VISIT: CPT | Performed by: PHYSICIAN ASSISTANT

## 2022-02-08 RX ORDER — DIPHENOXYLATE HYDROCHLORIDE AND ATROPINE SULFATE 2.5; .025 MG/1; MG/1
1 TABLET ORAL DAILY
COMMUNITY
End: 2022-02-22

## 2022-02-08 RX ORDER — CLOBETASOL PROPIONATE 0.5 MG/G
OINTMENT TOPICAL SEE ADMIN INSTRUCTIONS
Qty: 45 G | Refills: 0 | Status: SHIPPED | OUTPATIENT
Start: 2022-02-08 | End: 2022-02-22

## 2022-02-08 NOTE — PATIENT INSTRUCTIONS
Lichen sclerosus  Recommend clobetasol ointment  Use thin layer on affected area three times daily until symptoms improve than decrease to twice daily for a few days then once daily for a few days  In between flares can use hydrocortisone cream over the counter and/or coconut oil

## 2022-02-08 NOTE — PROGRESS NOTES
Assessment/Plan   Problem List Items Addressed This Visit        Other    Irregular menses     Pap guidelines reviewed  Pap deferred secondary to negative pap and HPV in 2019 in this low risk patient  Reviewed irregular painful menses with patient  Will plan blood work and pelvic ultrasound to further evaluate  Office will call with results and appropriate follow up  Patient would like to continue this pill for now while obtaining work up  Reviewed suspicion of Lichen sclerosus based on exam findings and intermittent vulvar itching  Recommend clobetasol ointment  Use thin layer on affected area three times daily until symptoms improve than decrease to twice daily for a few days then once daily for a few days  In between flares can use hydrocortisone cream over the counter and/or coconut oil  ]  Return to office for annual or as needed  Relevant Medications    norgestrel-ethinyl estradiol (LO/OVRAL) 0 3 mg-30 mcg per tablet    Other Relevant Orders    US pelvis complete w transvaginal    TSH, 3rd generation    T4, free    CBC and differential    Gynecologic exam normal - Primary     Pap guidelines reviewed  Other Visit Diagnoses     Dysmenorrhea        Relevant Medications    norgestrel-ethinyl estradiol (LO/OVRAL) 0 3 mg-30 mcg per tablet    Other Relevant Orders    US pelvis complete w transvaginal    TSH, 3rd generation    T4, free    CBC and differential    Weight gain        Relevant Orders    TSH, 3rd generation    T4, free    Comprehensive metabolic panel    Insulin, fasting    Hemoglobin D2P    Lichen sclerosus        Relevant Medications    clobetasol (TEMOVATE) 0 05 % ointment          Subjective:     Patient ID: Audi Sierra is a 34 y o  y o  female  HPI  35 yo seen for annual exam  Currently on Lo Ovral OCP  Reports has been having some breakthrough bleeding  Experiencing more cramping  Occurring every menses  First 2 days  Does get heavy pads changing every 2-3 hours   Hx of uterine fibroid found during pregnancy, no follow up ultrasounds  Patient has also been struggling with the inability to lose weight  Has tried dietary modifications and exercise very strict for 5-6 months with only 15 lb weight loss  Has seen weight loss specialist in the past and started on medications that worked but caused her to have side effects  Denies bowel or bladder issues  Does reports vulvar itching that comes and goes, mostly around clitoral area and in creases of labia  Patient has appearance of exophthalmos and enlarged thyroid but thyroid blood work and thyroid ultrasound always normal     Last pap: 8/1/2019 NILM (-)HRHPV  The following portions of the patient's history were reviewed and updated as appropriate:   She  has no past medical history on file  She   Patient Active Problem List    Diagnosis Date Noted    Gynecologic exam normal 02/08/2022    Encounter for gynecological examination without abnormal finding 08/01/2019    Enlarged thyroid 03/15/2018    Encounter for gynecological examination 03/15/2018    Irregular menses 03/15/2018    Acute pelvic pain, female 12/04/2015    Asthma 10/21/2015    Uterine fibroid in antepartum period 10/24/2014     She  has a past surgical history that includes Tooth extraction (2005)  Her family history includes Diabetes in her father and paternal grandfather; Liver cancer in her maternal grandmother  She  reports that she has never smoked  She has never used smokeless tobacco  She reports current alcohol use  She reports current drug use  Drug: Marijuana    Current Outpatient Medications   Medication Sig Dispense Refill    albuterol (2 5 mg/3 mL) 0 083 % nebulizer solution inhale contents of 1 vial in nebulizer every 4 hours if needed for wheezing  0    EPINEPHrine (EPIPEN) 0 3 mg/0 3 mL SOAJ USE ASE DIRECTED BY MD  0    multivitamin (THERAGRAN) TABS Take 1 tablet by mouth daily      norgestrel-ethinyl estradiol (LO/OVRAL) 0 3 mg-30 mcg per tablet Take 1 tablet by mouth daily 90 tablet 3    VENTOLIN  (90 Base) MCG/ACT inhaler inhale 2 puff by inhalation route  every 8 hours as needed  0    clobetasol (TEMOVATE) 0 05 % ointment Apply topically see administration instructions Apply thin layer TID to affected area until symptoms improve than decrease to BID for a few days and then daily for a few days  45 g 0     No current facility-administered medications for this visit  She is allergic to grapeseed extract [fish oil - food allergy] and pollen extract       Menstrual History:  OB History        2    Para   1    Term   1            AB   1    Living   1       SAB   1    IAB        Ectopic        Multiple        Live Births   1           Obstetric Comments   MENARCHE- AGE 11              Patient's last menstrual period was 2022  Review of Systems   Constitutional: Negative for fatigue, fever and unexpected weight change  HENT: Negative for dental problem and sinus pressure  Eyes: Negative for visual disturbance  Respiratory: Negative for cough, shortness of breath and wheezing  Cardiovascular: Negative for chest pain  Gastrointestinal: Negative for abdominal pain, blood in stool, constipation, diarrhea, nausea and vomiting  Endocrine: Negative for polydipsia  Genitourinary: Negative for difficulty urinating, dyspareunia, dysuria, frequency, hematuria, pelvic pain and urgency  Musculoskeletal: Negative for arthralgias and back pain  Neurological: Negative for dizziness, seizures, light-headedness and headaches  Psychiatric/Behavioral: Negative for suicidal ideas  The patient is not nervous/anxious  Objective:  Vitals:    22 1030   BP: 120/80   BP Location: Left arm   Patient Position: Sitting   Cuff Size: Large   Weight: 92 1 kg (203 lb)   Height: 5' 2" (1 575 m)      Physical Exam  Constitutional:       Appearance: Normal appearance  She is well-developed     Genitourinary: Vulva and bladder normal       No lesions in the vagina  Right Labia: No rash, tenderness, lesions or skin changes  Left Labia: No tenderness, lesions, skin changes or rash  No labial fusion noted  No inguinal adenopathy present in the right or left side  No vaginal discharge, erythema, tenderness or bleeding  Right Adnexa: not tender, not full and no mass present  Left Adnexa: not tender, not full and no mass present  No cervical motion tenderness, discharge or lesion  Uterus is not enlarged, tender or irregular  No uterine mass detected  No urethral prolapse, tenderness or mass present  Bladder is not tender  Breasts: Breasts are symmetrical       Right: No swelling, bleeding, inverted nipple, mass, nipple discharge, skin change, tenderness, axillary adenopathy or supraclavicular adenopathy  Left: No swelling, bleeding, inverted nipple, mass, nipple discharge, skin change, tenderness, axillary adenopathy or supraclavicular adenopathy  HENT:      Head: Normocephalic and atraumatic  Neck:      Thyroid: No thyromegaly  Cardiovascular:      Rate and Rhythm: Normal rate and regular rhythm  Heart sounds: Normal heart sounds  No murmur heard  No friction rub  No gallop  Pulmonary:      Effort: Pulmonary effort is normal  No respiratory distress  Breath sounds: Normal breath sounds  No wheezing  Abdominal:      General: There is no distension  Palpations: Abdomen is soft  There is no mass  Tenderness: There is no abdominal tenderness  There is no guarding or rebound  Hernia: No hernia is present  Lymphadenopathy:      Cervical: No cervical adenopathy  Upper Body:      Right upper body: No supraclavicular, axillary or pectoral adenopathy  Left upper body: No supraclavicular, axillary or pectoral adenopathy  Lower Body: No right inguinal adenopathy  No left inguinal adenopathy     Neurological: Mental Status: She is alert and oriented to person, place, and time  Skin:     General: Skin is warm and dry     Psychiatric:         Behavior: Behavior normal

## 2022-02-09 NOTE — ASSESSMENT & PLAN NOTE
Pap guidelines reviewed  Pap deferred secondary to negative pap and HPV in 2019 in this low risk patient  Reviewed irregular painful menses with patient  Will plan blood work and pelvic ultrasound to further evaluate  Office will call with results and appropriate follow up  Patient would like to continue this pill for now while obtaining work up  Reviewed suspicion of Lichen sclerosus based on exam findings and intermittent vulvar itching  Recommend clobetasol ointment  Use thin layer on affected area three times daily until symptoms improve than decrease to twice daily for a few days then once daily for a few days  In between flares can use hydrocortisone cream over the counter and/or coconut oil  ]  Return to office for annual or as needed

## 2022-02-11 ENCOUNTER — APPOINTMENT (OUTPATIENT)
Dept: LAB | Facility: HOSPITAL | Age: 32
End: 2022-02-11
Payer: COMMERCIAL

## 2022-02-11 DIAGNOSIS — N94.6 DYSMENORRHEA: ICD-10-CM

## 2022-02-11 DIAGNOSIS — N92.6 IRREGULAR MENSES: ICD-10-CM

## 2022-02-11 DIAGNOSIS — R63.5 WEIGHT GAIN: ICD-10-CM

## 2022-02-11 LAB
ALBUMIN SERPL BCP-MCNC: 4 G/DL (ref 3.4–4.8)
ALP SERPL-CCNC: 77.3 U/L (ref 35–140)
ALT SERPL W P-5'-P-CCNC: 12 U/L (ref 5–54)
ANION GAP SERPL CALCULATED.3IONS-SCNC: 7 MMOL/L (ref 4–13)
AST SERPL W P-5'-P-CCNC: 11 U/L (ref 15–41)
BASOPHILS # BLD AUTO: 0.04 THOUSANDS/ΜL (ref 0–0.1)
BASOPHILS NFR BLD AUTO: 1 % (ref 0–1)
BILIRUB SERPL-MCNC: 0.37 MG/DL (ref 0.3–1.2)
BUN SERPL-MCNC: 10 MG/DL (ref 6–20)
CALCIUM SERPL-MCNC: 8.5 MG/DL (ref 8.4–10.2)
CHLORIDE SERPL-SCNC: 109 MMOL/L (ref 96–108)
CO2 SERPL-SCNC: 23 MMOL/L (ref 22–33)
CREAT SERPL-MCNC: 0.88 MG/DL (ref 0.4–1.1)
EOSINOPHIL # BLD AUTO: 0.23 THOUSAND/ΜL (ref 0–0.61)
EOSINOPHIL NFR BLD AUTO: 3 % (ref 0–6)
ERYTHROCYTE [DISTWIDTH] IN BLOOD BY AUTOMATED COUNT: 12.8 % (ref 11.6–15.1)
EST. AVERAGE GLUCOSE BLD GHB EST-MCNC: 100 MG/DL
GFR SERPL CREATININE-BSD FRML MDRD: 86 ML/MIN/1.73SQ M
GLUCOSE P FAST SERPL-MCNC: 126 MG/DL (ref 70–105)
HBA1C MFR BLD: 5.1 %
HCT VFR BLD AUTO: 39.6 % (ref 34.8–46.1)
HGB BLD-MCNC: 12.6 G/DL (ref 11.5–15.4)
IMM GRANULOCYTES # BLD AUTO: 0.02 THOUSAND/UL (ref 0–0.2)
IMM GRANULOCYTES NFR BLD AUTO: 0 % (ref 0–2)
INSULIN SERPL-ACNC: 26.4 MU/L (ref 3–25)
LYMPHOCYTES # BLD AUTO: 3.58 THOUSANDS/ΜL (ref 0.6–4.47)
LYMPHOCYTES NFR BLD AUTO: 41 % (ref 14–44)
MCH RBC QN AUTO: 29 PG (ref 26.8–34.3)
MCHC RBC AUTO-ENTMCNC: 31.8 G/DL (ref 31.4–37.4)
MCV RBC AUTO: 91 FL (ref 82–98)
MONOCYTES # BLD AUTO: 0.61 THOUSAND/ΜL (ref 0.17–1.22)
MONOCYTES NFR BLD AUTO: 7 % (ref 4–12)
NEUTROPHILS # BLD AUTO: 4.37 THOUSANDS/ΜL (ref 1.85–7.62)
NEUTS SEG NFR BLD AUTO: 48 % (ref 43–75)
NRBC BLD AUTO-RTO: 0 /100 WBCS
PLATELET # BLD AUTO: 196 THOUSANDS/UL (ref 149–390)
PMV BLD AUTO: 10.9 FL (ref 8.9–12.7)
POTASSIUM SERPL-SCNC: 4.1 MMOL/L (ref 3.5–5)
PROT SERPL-MCNC: 7.3 G/DL (ref 6.4–8.3)
RBC # BLD AUTO: 4.35 MILLION/UL (ref 3.81–5.12)
SODIUM SERPL-SCNC: 139 MMOL/L (ref 133–145)
T4 FREE SERPL-MCNC: 1.07 NG/DL (ref 0.76–1.46)
TSH SERPL DL<=0.05 MIU/L-ACNC: 3.55 UIU/ML (ref 0.34–5.6)
WBC # BLD AUTO: 8.85 THOUSAND/UL (ref 4.31–10.16)

## 2022-02-11 PROCEDURE — 83036 HEMOGLOBIN GLYCOSYLATED A1C: CPT

## 2022-02-11 PROCEDURE — 36415 COLL VENOUS BLD VENIPUNCTURE: CPT

## 2022-02-11 PROCEDURE — 84439 ASSAY OF FREE THYROXINE: CPT

## 2022-02-11 PROCEDURE — 85025 COMPLETE CBC W/AUTO DIFF WBC: CPT

## 2022-02-11 PROCEDURE — 84443 ASSAY THYROID STIM HORMONE: CPT

## 2022-02-11 PROCEDURE — 80053 COMPREHEN METABOLIC PANEL: CPT

## 2022-02-11 PROCEDURE — 83525 ASSAY OF INSULIN: CPT

## 2022-02-15 ENCOUNTER — HOSPITAL ENCOUNTER (OUTPATIENT)
Dept: ULTRASOUND IMAGING | Facility: HOSPITAL | Age: 32
Discharge: HOME | End: 2022-02-15
Payer: COMMERCIAL

## 2022-02-15 ENCOUNTER — TELEPHONE (OUTPATIENT)
Dept: OBGYN CLINIC | Facility: CLINIC | Age: 32
End: 2022-02-15

## 2022-02-15 DIAGNOSIS — N92.6 IRREGULAR MENSES: ICD-10-CM

## 2022-02-15 DIAGNOSIS — N94.6 DYSMENORRHEA: ICD-10-CM

## 2022-02-15 PROCEDURE — 76856 US EXAM PELVIC COMPLETE: CPT

## 2022-02-15 PROCEDURE — 76830 TRANSVAGINAL US NON-OB: CPT

## 2022-02-24 DIAGNOSIS — N92.6 IRREGULAR MENSES: Primary | ICD-10-CM

## 2022-02-24 RX ORDER — NORGESTIMATE AND ETHINYL ESTRADIOL 7DAYSX3 28
1 KIT ORAL DAILY
Qty: 28 TABLET | Refills: 2 | Status: SHIPPED | OUTPATIENT
Start: 2022-02-24 | End: 2022-05-17

## 2022-02-25 ENCOUNTER — TELEPHONE (OUTPATIENT)
Dept: OTHER | Facility: OTHER | Age: 32
End: 2022-02-25

## 2022-02-25 NOTE — TELEPHONE ENCOUNTER
Please call the patient in regard to her appointment for today @ 10:30  Patient states she has been calling since last night to change the appointment to virtual but has been unsuccessful  Please advise if another appointment could be offered maybe later today

## 2022-03-14 ENCOUNTER — OFFICE VISIT (OUTPATIENT)
Dept: FAMILY MEDICINE CLINIC | Facility: CLINIC | Age: 32
End: 2022-03-14
Payer: COMMERCIAL

## 2022-03-14 VITALS
HEART RATE: 88 BPM | OXYGEN SATURATION: 99 % | WEIGHT: 202 LBS | RESPIRATION RATE: 16 BRPM | HEIGHT: 62 IN | BODY MASS INDEX: 37.17 KG/M2 | TEMPERATURE: 97.2 F

## 2022-03-14 DIAGNOSIS — Z00.00 ANNUAL PHYSICAL EXAM: Primary | ICD-10-CM

## 2022-03-14 DIAGNOSIS — R73.01 IMPAIRED FASTING GLUCOSE: ICD-10-CM

## 2022-03-14 DIAGNOSIS — Z11.59 NEED FOR HEPATITIS C SCREENING TEST: ICD-10-CM

## 2022-03-14 DIAGNOSIS — Z88.9 HISTORY OF ALLERGIC REACTION: ICD-10-CM

## 2022-03-14 DIAGNOSIS — Z11.4 ENCOUNTER FOR SCREENING FOR HIV: ICD-10-CM

## 2022-03-14 DIAGNOSIS — Z76.89 ENCOUNTER TO ESTABLISH CARE WITH NEW DOCTOR: ICD-10-CM

## 2022-03-14 DIAGNOSIS — E66.9 CLASS 2 OBESITY WITH BODY MASS INDEX (BMI) OF 36.0 TO 36.9 IN ADULT, UNSPECIFIED OBESITY TYPE, UNSPECIFIED WHETHER SERIOUS COMORBIDITY PRESENT: ICD-10-CM

## 2022-03-14 PROBLEM — M54.50 CHRONIC BILATERAL LOW BACK PAIN: Status: ACTIVE | Noted: 2022-03-14

## 2022-03-14 PROBLEM — E04.9 ENLARGED THYROID: Status: RESOLVED | Noted: 2018-03-15 | Resolved: 2022-03-14

## 2022-03-14 PROBLEM — G89.29 CHRONIC BILATERAL LOW BACK PAIN: Status: ACTIVE | Noted: 2022-03-14

## 2022-03-14 PROBLEM — E66.812 CLASS 2 OBESITY WITH BODY MASS INDEX (BMI) OF 36.0 TO 36.9 IN ADULT: Status: ACTIVE | Noted: 2022-03-14

## 2022-03-14 PROCEDURE — 99385 PREV VISIT NEW AGE 18-39: CPT | Performed by: INTERNAL MEDICINE

## 2022-03-14 RX ORDER — PHENTERMINE HYDROCHLORIDE 37.5 MG/1
37.5 TABLET ORAL DAILY
Qty: 30 TABLET | Refills: 3 | Status: SHIPPED | OUTPATIENT
Start: 2022-03-14 | End: 2022-05-28 | Stop reason: SDUPTHER

## 2022-03-14 NOTE — PROGRESS NOTES
[facility]    NAME: Nikolai Tineo  AGE: 34 y o  SEX: female  : 1990     DATE: 3/14/2022     Assessment and Plan:     Problem List Items Addressed This Visit        Other    Class 2 obesity with body mass index (BMI) of 36 0 to 36 9 in adult     Discussed at length with patient-she'd been on topamax for awhile and had a terrible reaction to it -we discussed diet, exercise program, using metformin (has a fbs of 126 but A1c is 5 1%) and phenterimine and discussed all side effects-will refer to bariatric program-check labs         Relevant Medications    phentermine (Adipex-P) 37 5 MG tablet    metFORMIN (GLUCOPHAGE) 500 mg tablet    Other Relevant Orders    Ambulatory Referral to Bariatric Surgery      Other Visit Diagnoses     Annual physical exam    -  Primary    Relevant Orders    CBC and differential    Comprehensive metabolic panel    Lipid panel    TSH, 3rd generation    Hepatitis C antibody    HIV 1/2 Antigen/Antibody (4th Generation) w Reflex [facility]    Encounter to establish care with new doctor        Encounter for screening for HIV        Relevant Orders    HIV 1/2 Antigen/Antibody (4th Generation) w Reflex [facility]    Need for hepatitis C screening test        Relevant Orders    Hepatitis C antibody    History of allergic reaction        Relevant Orders    Ambulatory Referral to Allergy    Impaired fasting glucose        Relevant Medications    metFORMIN (GLUCOPHAGE) 500 mg tablet    Other Relevant Orders    Ambulatory Referral to Bariatric Surgery          Immunizations and preventive care screenings were discussed with patient today  Appropriate education was printed on patient's after visit summary  Counseling:  Alcohol/drug use: discussed moderation in alcohol intake, the recommendations for healthy alcohol use, and avoidance of illicit drug use    Dental Health: discussed importance of regular tooth brushing, flossing, and dental visits  · Exercise: the importance of regular exercise/physical activity was discussed  Recommend exercise 3-5 times per week for at least 30 minutes  No follow-ups on file  Chief Complaint:     Chief Complaint   Patient presents with   [facility] Patient Visit    Establish Care    Physical Exam      History of Present Illness:     Adult Annual Physical   Patient here for a comprehensive physical exam  The patient reports problems - weight gain  Diet and Physical Activity  · Diet/Nutrition: well balanced diet  · Exercise: 1-2 times a week on average  Depression Screening  PHQ-2/9 Depression Screening    Little interest or pleasure in doing things: 1 - several days  Feeling down, depressed, or hopeless: 1 - several days  PHQ-2 Score: 2  PHQ-2 Interpretation: Negative depression screen       General Health  · Sleep: sleeps well  · Hearing: normal - bilateral   · Vision: wears glasses  · Dental: regular dental visits  /GYN Health  · Last menstrual period: irregular  · Contraceptive method: oral contraceptives  · History of STDs?: no      Review of Systems:     Review of Systems   Constitutional: Positive for unexpected weight change  Cardiovascular: Negative  Musculoskeletal: Positive for arthralgias and back pain  Neurological: Positive for numbness  Hematological: Negative  Psychiatric/Behavioral: Positive for dysphoric mood        Past Medical History:     Past Medical History:   Diagnosis Date    Asthma     Enlarged thyroid     Uterine fibroid       Past Surgical History:     Past Surgical History:   Procedure Laterality Date    TOOTH EXTRACTION  2005      Social History:     Social History     Socioeconomic History    Marital status: Single     Spouse name: None    Number of children: None    Years of education: None    Highest education level: None   Occupational History    Occupation: [employer]    Tobacco Use    Smoking status: Never Smoker    Smokeless tobacco: Never Used   Vaping Use    Vaping Use: Never used   Substance and Sexual Activity    Alcohol use: Yes     Comment: SOCIAL     Drug use: Yes     Types: Marijuana    Sexual activity: Yes     Partners: Male     Birth control/protection: OCP   Other Topics Concern    None   Social History Narrative    None     Social Determinants of Health     Financial Resource Strain: Not on file   Food Insecurity: Not on file   Transportation Needs: Not on file   Physical Activity: Not on file   Stress: Not on file   Social Connections: Not on file   Intimate Partner Violence: Not on file   Housing Stability: Not on file      Family History:     Family History   Problem Relation Age of Onset    No Known Problems Mother     Diabetes Father     Hypertension Father     Liver cancer Maternal Grandmother     Mental illness Maternal Grandmother     Hypertension Paternal Grandmother     Diabetes Paternal Grandfather     Asthma Paternal Aunt     Asthma Paternal Uncle       Current Medications:     Current Outpatient Medications   Medication Sig Dispense Refill    albuterol (2 5 mg/3 mL) 0 083 % nebulizer solution inhale contents of 1 vial in nebulizer every 4 hours if needed for wheezing  0    EPINEPHrine (EPIPEN) 0 3 mg/0 3 mL SOAJ USE ASE DIRECTED BY MD  0    norgestimate-ethinyl estradiol (Ortho Tri-Cyclen, 28,) 0 18/0 215/0 25 MG-35 MCG per tablet Take 1 tablet by mouth daily 28 tablet 2    VENTOLIN  (90 Base) MCG/ACT inhaler inhale 2 puff by inhalation route  every 8 hours as needed  0    metFORMIN (GLUCOPHAGE) 500 mg tablet Take 1 tablet daily with largest meal 30 tablet 5    phentermine (Adipex-P) 37 5 MG tablet Take 1 tablet (37 5 mg total) by mouth in the morning 30 tablet 3     No current facility-administered medications for this visit  Allergies:      Allergies   Allergen Reactions    Grape (Artificial) Flavor - Food Allergy Anaphylaxis, Hives, Itching and Shortness Of Breath    Pollen Extract       Physical Exam:     Pulse 88   Temp (!) 97 2 °F (36 2 °C) (Temporal)   Resp 16   Ht 5' 2" (1 575 m)   Wt 91 6 kg (202 lb)   SpO2 99%   BMI 36 95 kg/m²     Physical Exam     Bony Mike MD   [facility]

## 2022-03-14 NOTE — PATIENT INSTRUCTIONS

## 2022-03-14 NOTE — ASSESSMENT & PLAN NOTE
Discussed at length with patient-she'd been on topamax for awhile and had a terrible reaction to it -we discussed diet, exercise program, using metformin (has a fbs of 126 but A1c is 5 1%) and phenterimine and discussed all side effects-will refer to bariatric program-check labs

## 2022-03-21 ENCOUNTER — TELEPHONE (OUTPATIENT)
Dept: BARIATRICS | Facility: CLINIC | Age: 32
End: 2022-03-21

## 2022-04-28 ENCOUNTER — APPOINTMENT (OUTPATIENT)
Dept: LAB | Facility: HOSPITAL | Age: 32
End: 2022-04-28
Attending: INTERNAL MEDICINE
Payer: COMMERCIAL

## 2022-04-28 DIAGNOSIS — J30.1 NON-SEASONAL ALLERGIC RHINITIS DUE TO POLLEN: ICD-10-CM

## 2022-04-28 DIAGNOSIS — Z11.4 ENCOUNTER FOR SCREENING FOR HIV: ICD-10-CM

## 2022-04-28 DIAGNOSIS — J45.20 MILD INTERMITTENT ASTHMA WITHOUT COMPLICATION: ICD-10-CM

## 2022-04-28 DIAGNOSIS — Z00.00 ANNUAL PHYSICAL EXAM: ICD-10-CM

## 2022-04-28 DIAGNOSIS — Z91.018 ALLERGY, FOOD: ICD-10-CM

## 2022-04-28 DIAGNOSIS — Z11.59 NEED FOR HEPATITIS C SCREENING TEST: ICD-10-CM

## 2022-04-28 DIAGNOSIS — L20.9 ATOPIC DERMATITIS, UNSPECIFIED TYPE: ICD-10-CM

## 2022-04-28 LAB
ALBUMIN SERPL BCP-MCNC: 3.9 G/DL (ref 3.5–5)
ALP SERPL-CCNC: 106 U/L (ref 34–104)
ALT SERPL W P-5'-P-CCNC: 16 U/L (ref 7–52)
ANION GAP SERPL CALCULATED.3IONS-SCNC: 6 MMOL/L (ref 4–13)
AST SERPL W P-5'-P-CCNC: 11 U/L (ref 13–39)
BASOPHILS # BLD AUTO: 0.03 THOUSANDS/ΜL (ref 0–0.1)
BASOPHILS NFR BLD AUTO: 0 % (ref 0–1)
BILIRUB SERPL-MCNC: 0.4 MG/DL (ref 0.2–1)
BUN SERPL-MCNC: 5 MG/DL (ref 5–25)
CALCIUM SERPL-MCNC: 8.4 MG/DL (ref 8.4–10.2)
CHLORIDE SERPL-SCNC: 107 MMOL/L (ref 96–108)
CHOLEST SERPL-MCNC: 161 MG/DL
CO2 SERPL-SCNC: 26 MMOL/L (ref 21–32)
CREAT SERPL-MCNC: 0.77 MG/DL (ref 0.6–1.3)
EOSINOPHIL # BLD AUTO: 0.27 THOUSAND/ΜL (ref 0–0.61)
EOSINOPHIL NFR BLD AUTO: 3 % (ref 0–6)
ERYTHROCYTE [DISTWIDTH] IN BLOOD BY AUTOMATED COUNT: 12.5 % (ref 11.6–15.1)
GFR SERPL CREATININE-BSD FRML MDRD: 101 ML/MIN/1.73SQ M
GLUCOSE P FAST SERPL-MCNC: 100 MG/DL (ref 65–99)
HCT VFR BLD AUTO: 38.9 % (ref 34.8–46.1)
HDLC SERPL-MCNC: 43 MG/DL
HGB BLD-MCNC: 12.9 G/DL (ref 11.5–15.4)
IMM GRANULOCYTES # BLD AUTO: 0.02 THOUSAND/UL (ref 0–0.2)
IMM GRANULOCYTES NFR BLD AUTO: 0 % (ref 0–2)
LDLC SERPL CALC-MCNC: 77 MG/DL (ref 0–100)
LYMPHOCYTES # BLD AUTO: 3.52 THOUSANDS/ΜL (ref 0.6–4.47)
LYMPHOCYTES NFR BLD AUTO: 42 % (ref 14–44)
MCH RBC QN AUTO: 29.7 PG (ref 26.8–34.3)
MCHC RBC AUTO-ENTMCNC: 33.2 G/DL (ref 31.4–37.4)
MCV RBC AUTO: 90 FL (ref 82–98)
MONOCYTES # BLD AUTO: 0.52 THOUSAND/ΜL (ref 0.17–1.22)
MONOCYTES NFR BLD AUTO: 6 % (ref 4–12)
NEUTROPHILS # BLD AUTO: 4.07 THOUSANDS/ΜL (ref 1.85–7.62)
NEUTS SEG NFR BLD AUTO: 49 % (ref 43–75)
NONHDLC SERPL-MCNC: 118 MG/DL
NRBC BLD AUTO-RTO: 0 /100 WBCS
PLATELET # BLD AUTO: 206 THOUSANDS/UL (ref 149–390)
PMV BLD AUTO: 10.8 FL (ref 8.9–12.7)
POTASSIUM SERPL-SCNC: 3.8 MMOL/L (ref 3.5–5.3)
PROT SERPL-MCNC: 7.2 G/DL (ref 6.4–8.4)
RBC # BLD AUTO: 4.34 MILLION/UL (ref 3.81–5.12)
SODIUM SERPL-SCNC: 139 MMOL/L (ref 135–147)
TRIGL SERPL-MCNC: 207 MG/DL
TSH SERPL DL<=0.05 MIU/L-ACNC: 2.17 UIU/ML (ref 0.45–4.5)
WBC # BLD AUTO: 8.43 THOUSAND/UL (ref 4.31–10.16)

## 2022-04-28 PROCEDURE — 84443 ASSAY THYROID STIM HORMONE: CPT

## 2022-04-28 PROCEDURE — 86008 ALLG SPEC IGE RECOMB EA: CPT

## 2022-04-28 PROCEDURE — 82785 ASSAY OF IGE: CPT

## 2022-04-28 PROCEDURE — 86803 HEPATITIS C AB TEST: CPT

## 2022-04-28 PROCEDURE — 36415 COLL VENOUS BLD VENIPUNCTURE: CPT

## 2022-04-28 PROCEDURE — 85025 COMPLETE CBC W/AUTO DIFF WBC: CPT

## 2022-04-28 PROCEDURE — 80061 LIPID PANEL: CPT

## 2022-04-28 PROCEDURE — 87389 HIV-1 AG W/HIV-1&-2 AB AG IA: CPT

## 2022-04-28 PROCEDURE — 80053 COMPREHEN METABOLIC PANEL: CPT

## 2022-04-28 PROCEDURE — 86003 ALLG SPEC IGE CRUDE XTRC EA: CPT

## 2022-04-29 LAB
A ALTERNATA IGE QN: 13.4 KUA/I
A FUMIGATUS IGE QN: 1.79 KUA/I
ALMOND IGE QN: 30.6 KUA/I
ARA H6 PEANUT: 0.18 KUA/I
BERMUDA GRASS IGE QN: 15.8 KUA/I
BOXELDER IGE QN: 22.2 KUA/I
BRAZIL NUT IGE QN: 9.02 KUA/I
C HERBARUM IGE QN: 1.19 KUA/I
CASHEW NUT IGE QN: 0.82 KUA/I
CAT DANDER IGE QN: 16.6 KUA/I
CMN PIGWEED IGE QN: 15.6 KUA/I
COMMON RAGWEED IGE QN: 21.9 KUA/I
COTTONWOOD IGE QN: 16.9 KUA/I
D FARINAE IGE QN: 4.23 KUA/I
D PTERONYSS IGE QN: 1.92 KUA/I
DOG DANDER IGE QN: 26.2 KUA/I
HAZELNUT IGE QN: 33.8 KUA/L
HCV AB SER QL: NORMAL
HIV 1+2 AB+HIV1 P24 AG SERPL QL IA: NORMAL
LONDON PLANE IGE QN: 23.4 KUA/I
MOUSE URINE PROT IGE QN: 6.83 KUA/I
MT JUNIPER IGE QN: 10.3 KUA/I
MUGWORT IGE QN: 12.7 KUA/I
P NOTATUM IGE QN: 1.28 KUA/I
PEANUT (RARA H) 1 IGE QN: 0.2 KUA/I
PEANUT (RARA H) 2 IGE QN: 0.19 KUA/I
PEANUT (RARA H) 3 IGE QN: 0.15 KUA/I
PEANUT (RARA H) 8 IGE QN: 18.7 KUA/I
PEANUT (RARA H) 9 IGE QN: 25.3 KUA/I
PEANUT IGE QN: 30.6 KUA/I
PECAN/HICK NUT IGE QN: 2.64 KUA/I
PISTACHIO IGE QN: 18 KUA/I
ROACH IGE QN: 5.22 KUA/I
SHEEP SORREL IGE QN: 16.5 KUA/I
SILVER BIRCH IGE QN: 19.4 KUA/I
TIMOTHY IGE QN: 15 KUA/I
TOTAL IGE SMQN RAST: 3148 KU/L (ref 0–113)
TOTAL IGE SMQN RAST: 3148 KU/L (ref 0–113)
WALNUT IGE QN: 23.1 KUA/I
WALNUT IGE QN: 37.6 KUA/I
WHITE ASH IGE QN: 25.5 KUA/I
WHITE ELM IGE QN: 50.6 KUA/I
WHITE MULBERRY IGE QN: 12.9 KUA/I
WHITE OAK IGE QN: 20.4 KUA/I

## 2022-05-17 ENCOUNTER — HOSPITAL ENCOUNTER (EMERGENCY)
Facility: HOSPITAL | Age: 32
Discharge: HOME | End: 2022-05-17
Attending: EMERGENCY MEDICINE | Admitting: EMERGENCY MEDICINE
Payer: COMMERCIAL

## 2022-05-17 VITALS
RESPIRATION RATE: 16 BRPM | HEART RATE: 62 BPM | WEIGHT: 200 LBS | TEMPERATURE: 97.5 F | SYSTOLIC BLOOD PRESSURE: 96 MMHG | HEIGHT: 62 IN | BODY MASS INDEX: 36.8 KG/M2 | OXYGEN SATURATION: 100 % | DIASTOLIC BLOOD PRESSURE: 63 MMHG

## 2022-05-17 DIAGNOSIS — R42 DIZZINESS: ICD-10-CM

## 2022-05-17 DIAGNOSIS — R11.2 NAUSEA AND VOMITING, UNSPECIFIED VOMITING TYPE: Primary | ICD-10-CM

## 2022-05-17 LAB
ALBUMIN SERPL BCP-MCNC: 4.1 G/DL (ref 3.5–5)
ALP SERPL-CCNC: 99 U/L (ref 34–104)
ALT SERPL W P-5'-P-CCNC: 101 U/L (ref 7–52)
ANION GAP SERPL CALCULATED.3IONS-SCNC: 10 MMOL/L (ref 4–13)
AST SERPL W P-5'-P-CCNC: 42 U/L (ref 13–39)
BACTERIA UR QL AUTO: ABNORMAL /HPF
BASOPHILS # BLD AUTO: 0.01 THOUSANDS/ΜL (ref 0–0.1)
BASOPHILS NFR BLD AUTO: 0 % (ref 0–1)
BILIRUB SERPL-MCNC: 0.4 MG/DL (ref 0.2–1)
BILIRUB UR QL STRIP: NEGATIVE
BUN SERPL-MCNC: 7 MG/DL (ref 5–25)
CALCIUM SERPL-MCNC: 8.7 MG/DL (ref 8.4–10.2)
CHLORIDE SERPL-SCNC: 106 MMOL/L (ref 96–108)
CLARITY UR: ABNORMAL
CO2 SERPL-SCNC: 25 MMOL/L (ref 21–32)
COLOR UR: ABNORMAL
CREAT SERPL-MCNC: 0.82 MG/DL (ref 0.6–1.3)
EOSINOPHIL # BLD AUTO: 0.07 THOUSAND/ΜL (ref 0–0.61)
EOSINOPHIL NFR BLD AUTO: 1 % (ref 0–6)
ERYTHROCYTE [DISTWIDTH] IN BLOOD BY AUTOMATED COUNT: 12.6 % (ref 11.6–15.1)
EXT PREG TEST URINE: NEGATIVE
EXT. CONTROL ED NAV: NORMAL
GFR SERPL CREATININE-BSD FRML MDRD: 94 ML/MIN/1.73SQ M
GLUCOSE SERPL-MCNC: 117 MG/DL (ref 65–140)
GLUCOSE UR STRIP-MCNC: ABNORMAL MG/DL
HCT VFR BLD AUTO: 40 % (ref 34.8–46.1)
HGB BLD-MCNC: 13.3 G/DL (ref 11.5–15.4)
HGB UR QL STRIP.AUTO: ABNORMAL
IMM GRANULOCYTES # BLD AUTO: 0.02 THOUSAND/UL (ref 0–0.2)
IMM GRANULOCYTES NFR BLD AUTO: 0 % (ref 0–2)
KETONES UR STRIP-MCNC: ABNORMAL MG/DL
LEUKOCYTE ESTERASE UR QL STRIP: ABNORMAL
LIPASE SERPL-CCNC: 12 U/L (ref 11–82)
LYMPHOCYTES # BLD AUTO: 2.64 THOUSANDS/ΜL (ref 0.6–4.47)
LYMPHOCYTES NFR BLD AUTO: 39 % (ref 14–44)
MCH RBC QN AUTO: 29.2 PG (ref 26.8–34.3)
MCHC RBC AUTO-ENTMCNC: 33.3 G/DL (ref 31.4–37.4)
MCV RBC AUTO: 88 FL (ref 82–98)
MONOCYTES # BLD AUTO: 0.51 THOUSAND/ΜL (ref 0.17–1.22)
MONOCYTES NFR BLD AUTO: 8 % (ref 4–12)
MUCOUS THREADS UR QL AUTO: ABNORMAL
NEUTROPHILS # BLD AUTO: 3.45 THOUSANDS/ΜL (ref 1.85–7.62)
NEUTS SEG NFR BLD AUTO: 52 % (ref 43–75)
NITRITE UR QL STRIP: NEGATIVE
NON-SQ EPI CELLS URNS QL MICRO: ABNORMAL /HPF
NRBC BLD AUTO-RTO: 0 /100 WBCS
PH UR STRIP.AUTO: 6 [PH]
PLATELET # BLD AUTO: 160 THOUSANDS/UL (ref 149–390)
PMV BLD AUTO: 12 FL (ref 8.9–12.7)
POTASSIUM SERPL-SCNC: 3.3 MMOL/L (ref 3.5–5.3)
PROT SERPL-MCNC: 7.4 G/DL (ref 6.4–8.4)
PROT UR STRIP-MCNC: ABNORMAL MG/DL
RBC # BLD AUTO: 4.55 MILLION/UL (ref 3.81–5.12)
RBC #/AREA URNS AUTO: ABNORMAL /HPF
SODIUM SERPL-SCNC: 141 MMOL/L (ref 135–147)
SP GR UR STRIP.AUTO: >=1.03 (ref 1–1.03)
UROBILINOGEN UR QL STRIP.AUTO: 2 E.U./DL
WBC # BLD AUTO: 6.7 THOUSAND/UL (ref 4.31–10.16)
WBC #/AREA URNS AUTO: ABNORMAL /HPF

## 2022-05-17 PROCEDURE — 96374 THER/PROPH/DIAG INJ IV PUSH: CPT

## 2022-05-17 PROCEDURE — 80053 COMPREHEN METABOLIC PANEL: CPT | Performed by: EMERGENCY MEDICINE

## 2022-05-17 PROCEDURE — 36415 COLL VENOUS BLD VENIPUNCTURE: CPT | Performed by: EMERGENCY MEDICINE

## 2022-05-17 PROCEDURE — 96361 HYDRATE IV INFUSION ADD-ON: CPT

## 2022-05-17 PROCEDURE — 81001 URINALYSIS AUTO W/SCOPE: CPT | Performed by: EMERGENCY MEDICINE

## 2022-05-17 PROCEDURE — 96375 TX/PRO/DX INJ NEW DRUG ADDON: CPT

## 2022-05-17 PROCEDURE — 99283 EMERGENCY DEPT VISIT LOW MDM: CPT

## 2022-05-17 PROCEDURE — 83690 ASSAY OF LIPASE: CPT | Performed by: EMERGENCY MEDICINE

## 2022-05-17 PROCEDURE — 99284 EMERGENCY DEPT VISIT MOD MDM: CPT | Performed by: PHYSICIAN ASSISTANT

## 2022-05-17 PROCEDURE — 81025 URINE PREGNANCY TEST: CPT | Performed by: EMERGENCY MEDICINE

## 2022-05-17 PROCEDURE — 85025 COMPLETE CBC W/AUTO DIFF WBC: CPT | Performed by: EMERGENCY MEDICINE

## 2022-05-17 RX ORDER — FAMOTIDINE 10 MG/ML
20 INJECTION, SOLUTION INTRAVENOUS ONCE
Status: COMPLETED | OUTPATIENT
Start: 2022-05-17 | End: 2022-05-17

## 2022-05-17 RX ORDER — POTASSIUM CHLORIDE 20 MEQ/1
40 TABLET, EXTENDED RELEASE ORAL ONCE
Status: COMPLETED | OUTPATIENT
Start: 2022-05-17 | End: 2022-05-17

## 2022-05-17 RX ORDER — FAMOTIDINE 20 MG/1
20 TABLET, FILM COATED ORAL 2 TIMES DAILY
Qty: 30 TABLET | Refills: 0 | Status: SHIPPED | OUTPATIENT
Start: 2022-05-17

## 2022-05-17 RX ORDER — ONDANSETRON 4 MG/1
4 TABLET, FILM COATED ORAL EVERY 8 HOURS PRN
Qty: 10 TABLET | Refills: 0 | Status: SHIPPED | OUTPATIENT
Start: 2022-05-17

## 2022-05-17 RX ORDER — ONDANSETRON 2 MG/ML
4 INJECTION INTRAMUSCULAR; INTRAVENOUS ONCE
Status: COMPLETED | OUTPATIENT
Start: 2022-05-17 | End: 2022-05-17

## 2022-05-17 RX ADMIN — FAMOTIDINE 20 MG: 10 INJECTION, SOLUTION INTRAVENOUS at 09:30

## 2022-05-17 RX ADMIN — POTASSIUM CHLORIDE 40 MEQ: 1500 TABLET, EXTENDED RELEASE ORAL at 09:55

## 2022-05-17 RX ADMIN — SODIUM CHLORIDE 1000 ML: 0.9 INJECTION, SOLUTION INTRAVENOUS at 09:34

## 2022-05-17 RX ADMIN — ONDANSETRON 4 MG: 2 INJECTION INTRAMUSCULAR; INTRAVENOUS at 09:30

## 2022-05-17 NOTE — ED PROVIDER NOTES
History  Chief Complaint   Patient presents with    Nausea     Pt presents to ed via walk in with N/V since mother day on and off and abd pain      PPMH: Asthma, Diabetes mellitus, Enlarged thyroid, Uterine fibroid , no pertinent PSH  Presents to ED c/o 10 day h/o intermittent B/L upper aching, abd pain, nausea, vomiting, no fever, no cp, no sob, no diarrhea, no constipation, no urinary complaints, currently menstruating, lmp 5/11/2022  Pt states sx started day after eating seafood, awoke with chills, tactile fever, vomiting/diarrhea for about 3 days  Took home COVID test which was neg  But has had persistent nausea/intermittent vomiting since, no pain, with decreased PO intake, now pt with dizziness  Prior to Admission Medications   Prescriptions Last Dose Informant Patient Reported? Taking?    EPINEPHrine (EPIPEN) 0 3 mg/0 3 mL SOAJ Unknown at Unknown time  Yes No   Sig: USE ASE DIRECTED BY MD NUNEZ  (90 Base) MCG/ACT inhaler Past Week at Unknown time  Yes Yes   Sig: inhale 2 puff by inhalation route  every 8 hours as needed   albuterol (2 5 mg/3 mL) 0 083 % nebulizer solution More than a month at Unknown time  Yes No   Sig: inhale contents of 1 vial in nebulizer every 4 hours if needed for wheezing   metFORMIN (GLUCOPHAGE) 500 mg tablet 5/17/2022 at Unknown time  No Yes   Sig: Take 1 tablet daily with largest meal   norgestimate-ethinyl estradiol (Ortho Tri-Cyclen, 28,) 0 18/0 215/0 25 MG-35 MCG per tablet 5/17/2022 at Unknown time  No Yes   Sig: Take 1 tablet by mouth daily   phentermine (Adipex-P) 37 5 MG tablet Not Taking at Unknown time  No No   Sig: Take 1 tablet (37 5 mg total) by mouth in the morning   Patient not taking: Reported on 5/17/2022      Facility-Administered Medications: None       Past Medical History:   Diagnosis Date    Asthma     Diabetes mellitus ([address] )     Enlarged thyroid     Uterine fibroid        Past Surgical History:   Procedure Laterality Date    TOOTH EXTRACTION  2005       Family History   Problem Relation Age of Onset    No Known Problems Mother     Diabetes Father     Hypertension Father     Liver cancer Maternal Grandmother     Mental illness Maternal Grandmother     Hypertension Paternal Grandmother     Diabetes Paternal Grandfather     Asthma Paternal Aunt     Asthma Paternal Uncle      I have reviewed and agree with the history as documented  E-Cigarette/Vaping    E-Cigarette Use Never User      E-Cigarette/Vaping Substances    Nicotine No     THC No     CBD No     Flavoring No     Other No     Unknown No      Social History     Tobacco Use    Smoking status: Never Smoker    Smokeless tobacco: Never Used   Vaping Use    Vaping Use: Never used   Substance Use Topics    Alcohol use: Yes     Comment: SOCIAL     Drug use: Yes     Types: Marijuana       Review of Systems   Constitutional: Positive for chills, fatigue and fever  HENT: Negative for hearing loss, rhinorrhea, sore throat and trouble swallowing  Eyes: Negative for visual disturbance  Respiratory: Negative for cough and shortness of breath  Cardiovascular: Negative for chest pain and leg swelling  Gastrointestinal: Positive for abdominal pain, nausea and vomiting  Negative for constipation and diarrhea  Genitourinary: Positive for vaginal bleeding  Negative for difficulty urinating, dysuria, flank pain, frequency, hematuria, urgency and vaginal discharge  Musculoskeletal: Negative for arthralgias and myalgias  Skin: Negative for pallor  Neurological: Positive for dizziness  Negative for weakness, light-headedness and headaches  Psychiatric/Behavioral: Negative for behavioral problems  All other systems reviewed and are negative  Physical Exam  Physical Exam  Vitals and nursing note reviewed  Constitutional:       General: She is in acute distress  Appearance: She is well-developed  She is obese     HENT:      Head: Normocephalic and atraumatic  Right Ear: External ear normal       Left Ear: External ear normal       Nose: Nose normal       Mouth/Throat:      Mouth: Mucous membranes are moist       Pharynx: Oropharynx is clear  Eyes:      Conjunctiva/sclera: Conjunctivae normal    Cardiovascular:      Rate and Rhythm: Normal rate and regular rhythm  Pulmonary:      Effort: Pulmonary effort is normal  No respiratory distress  Breath sounds: Normal breath sounds  Abdominal:      General: Bowel sounds are normal       Palpations: Abdomen is soft  Tenderness: There is no abdominal tenderness  There is no right CVA tenderness or left CVA tenderness  Musculoskeletal:         General: Normal range of motion  Cervical back: Normal range of motion  Skin:     General: Skin is warm and dry  Capillary Refill: Capillary refill takes less than 2 seconds  Neurological:      General: No focal deficit present  Mental Status: She is alert and oriented to person, place, and time  Motor: No weakness     Psychiatric:         Behavior: Behavior normal          Vital Signs  ED Triage Vitals [05/17/22 0901]   Temperature Pulse Respirations Blood Pressure SpO2   97 9 °F (36 6 °C) 72 17 122/73 99 %      Temp Source Heart Rate Source Patient Position - Orthostatic VS BP Location FiO2 (%)   Oral Monitor Sitting Left arm --      Pain Score       6           Vitals:    05/17/22 0901 05/17/22 1029   BP: 122/73 96/63   Pulse: 72 62   Patient Position - Orthostatic VS: Sitting Sitting         Visual Acuity      ED Medications  Medications   sodium chloride 0 9 % bolus 1,000 mL (0 mL Intravenous Stopped 5/17/22 1030)   ondansetron (ZOFRAN) injection 4 mg (4 mg Intravenous Given 5/17/22 0930)   Famotidine (PF) (PEPCID) injection 20 mg (20 mg Intravenous Given 5/17/22 0930)   potassium chloride (K-DUR,KLOR-CON) CR tablet 40 mEq (40 mEq Oral Given 5/17/22 0955)       Diagnostic Studies  Results Reviewed     Procedure Component Value Units Date/Time    Urine Microscopic [[id number]]  (Abnormal) Collected: 05/17/22 0908    Lab Status: Final result Specimen: Urine, Clean Catch Updated: 05/17/22 0950     RBC, UA Innumerable /hpf      WBC, UA 4-10 /hpf      Epithelial Cells Moderate /hpf      Bacteria, UA Moderate /hpf      MUCUS THREADS Moderate    Comprehensive metabolic panel [[id number]]  (Abnormal) Collected: 05/17/22 0916    Lab Status: Final result Specimen: Blood from Arm, Left Updated: 05/17/22 0946     Sodium 141 mmol/L      Potassium 3 3 mmol/L      Chloride 106 mmol/L      CO2 25 mmol/L      ANION GAP 10 mmol/L      BUN 7 mg/dL      Creatinine 0 82 mg/dL      Glucose 117 mg/dL      Calcium 8 7 mg/dL      AST 42 U/L       U/L      Alkaline Phosphatase 99 U/L      Total Protein 7 4 g/dL      Albumin 4 1 g/dL      Total Bilirubin 0 40 mg/dL      eGFR 94 ml/min/1 73sq m     Narrative:      [place] guidelines for Chronic Kidney Disease (CKD):     Stage 1 with normal or high GFR (GFR > 90 mL/min/1 73 square meters)    Stage 2 Mild CKD (GFR = 60-89 mL/min/1 73 square meters)    Stage 3A Moderate CKD (GFR = 45-59 mL/min/1 73 square meters)    Stage 3B Moderate CKD (GFR = 30-44 mL/min/1 73 square meters)    Stage 4 Severe CKD (GFR = 15-29 mL/min/1 73 square meters)    Stage 5 End Stage CKD (GFR <15 mL/min/1 73 square meters)  Note: GFR calculation is accurate only with a steady state creatinine    Lipase [[id number]]  (Normal) Collected: 05/17/22 0916    Lab Status: Final result Specimen: Blood from Arm, Left Updated: 05/17/22 0946     Lipase 12 u/L     UA w Reflex to Microscopic w Reflex to Culture [[id number]]  (Abnormal) Collected: 05/17/22 0908    Lab Status: Final result Specimen: Urine, Clean Catch Updated: 05/17/22 0933     Color, UA [place]     Clarity, UA Cloudy     Specific Gravity, UA >=1 030     pH, UA 6 0     Leukocytes, UA Trace     Nitrite, UA Negative     Protein, UA 2+ mg/dl      Glucose, UA Trace mg/dl      Ketones, UA Trace mg/dl      Urobilinogen, UA 2 0 E U /dl      Bilirubin, UA Negative     Blood, UA 3+    CBC and differential [[id number]] Collected: 05/17/22 0916    Lab Status: Final result Specimen: Blood from Arm, Left Updated: 05/17/22 0930     WBC 6 70 Thousand/uL      RBC 4 55 Million/uL      Hemoglobin 13 3 g/dL      Hematocrit 40 0 %      MCV 88 fL      MCH 29 2 pg      MCHC 33 3 g/dL      RDW 12 6 %      MPV 12 0 fL      Platelets 013 Thousands/uL      nRBC 0 /100 WBCs      Neutrophils Relative 52 %      Immat GRANS % 0 %      Lymphocytes Relative 39 %      Monocytes Relative 8 %      Eosinophils Relative 1 %      Basophils Relative 0 %      Neutrophils Absolute 3 45 Thousands/µL      Immature Grans Absolute 0 02 Thousand/uL      Lymphocytes Absolute 2 64 Thousands/µL      Monocytes Absolute 0 51 Thousand/µL      Eosinophils Absolute 0 07 Thousand/µL      Basophils Absolute 0 01 Thousands/µL     POCT pregnancy, urine [[id number]]  (Normal) Resulted: 05/17/22 0910    Lab Status: Final result Specimen: Urine Updated: 05/17/22 0910     EXT PREG TEST UR (Ref: Negative) negative     Control valid                 No orders to display              Procedures  Procedures         ED Course  ED Course as of 05/17/22 1103   Tue May 17, 2022   0952 UA/micro mixed picture, contaminant, pt without UTI sx, menstruating, will wait for culture to treat   0953 Case discussed with attending, agrees with plan to tx sx, FU with PCP for re-eval of liver enzymes                               SBIRT 20yo+    Flowsheet Row Most Recent Value   SBIRT (25 yo +)    In order to provide better care to our patients, we are screening all of our patients for alcohol and drug use  Would it be okay to ask you these screening questions?  No Filed at: 05/17/2022 0905                    MDM  Number of Diagnoses or Management Options  Diagnosis management comments: Pt feeling better, discussed labs results, need for FU with PCP for repeat liver enzymes       Amount and/or Complexity of Data Reviewed  Clinical lab tests: ordered and reviewed  Discuss the patient with other providers: yes        Disposition  Final diagnoses:   Nausea and vomiting, unspecified vomiting type   Dizziness     Time reflects when diagnosis was documented in both MDM as applicable and the Disposition within this note     Time User Action Codes Description Comment    5/17/2022 10:56 AM Nell Yoon [R11 2] Nausea and vomiting, unspecified vomiting type     5/17/2022 10:56 AM Nell Yoon [R42] Dizziness       ED Disposition     ED Disposition   Discharge    Condition   Stable    Date/Time   Tue May 17, 2022 11:01 AM    Comment   Aspen Sean discharge to home/self care  Follow-up Information     Follow up With Specialties Details Why [address], MD Internal Medicine, Pediatrics  As needed [address]  [facility] [address]  [phone]            Patient's Medications   Discharge Prescriptions    FAMOTIDINE (PEPCID) 20 MG TABLET    Take 1 tablet (20 mg total) by mouth in the morning and 1 tablet (20 mg total) in the evening  Start Date: 5/17/2022 End Date: --       Order Dose: 20 mg       Quantity: 30 tablet    Refills: 0    ONDANSETRON (ZOFRAN) 4 MG TABLET    Take 1 tablet (4 mg total) by mouth every 8 (eight) hours as needed for nausea or vomiting       Start Date: 5/17/2022 End Date: --       Order Dose: 4 mg       Quantity: 10 tablet    Refills: 0       No discharge procedures on file      PDMP Review     None          ED Provider  Electronically Signed by           Lorrie Zhou PA-C  05/17/22 8061

## 2022-05-17 NOTE — DISCHARGE INSTRUCTIONS
Clear liquid diet, advance as tolerated  Use Zofran as needed for nausea/vomiting  Take Pepcid daily to help with symptoms      Follow-up with your PCP in 1-2 weeks for further evaluation of slightly elevated liver enzymes, repeat labs

## 2022-05-17 NOTE — Clinical Note
Graciela Madden was seen and treated in our emergency department on 5/17/2022  Diagnosis:     Sarita Davila  may return to work on return date  She may return on this date: 05/18/2022         If you have any questions or concerns, please don't hesitate to call        Rosa Elena Ocasio PA-C    ______________________________           _______________          _______________  Hospital Representative                              Date                                Time

## 2022-05-17 NOTE — Clinical Note
Ramon Gant was seen and treated in our emergency department on 5/17/2022  Diagnosis:     Stefania He  may return to work on return date  She may return on this date: 05/18/2022         If you have any questions or concerns, please don't hesitate to call        Charan Grady PA-C    ______________________________           _______________          _______________  Hospital Representative                              Date                                Time

## 2022-05-28 DIAGNOSIS — E66.9 CLASS 2 OBESITY WITH BODY MASS INDEX (BMI) OF 36.0 TO 36.9 IN ADULT, UNSPECIFIED OBESITY TYPE, UNSPECIFIED WHETHER SERIOUS COMORBIDITY PRESENT: ICD-10-CM

## 2022-05-31 RX ORDER — PHENTERMINE HYDROCHLORIDE 37.5 MG/1
37.5 TABLET ORAL DAILY
Qty: 30 TABLET | Refills: 0 | Status: SHIPPED | OUTPATIENT
Start: 2022-05-31

## 2022-10-12 PROBLEM — Z01.419 GYNECOLOGIC EXAM NORMAL: Status: RESOLVED | Noted: 2022-02-08 | Resolved: 2022-10-12

## 2022-12-27 ENCOUNTER — TELEPHONE (OUTPATIENT)
Dept: OBGYN CLINIC | Facility: CLINIC | Age: 32
End: 2022-12-27

## 2022-12-27 NOTE — TELEPHONE ENCOUNTER
Pt called and says this month she is bleeding for 20 days straight, continuous flow  She would like a call back

## 2022-12-28 DIAGNOSIS — N93.9 MENSTRUAL BLEEDING PROBLEM: Primary | ICD-10-CM

## 2022-12-28 DIAGNOSIS — R10.9 ABDOMINAL CRAMPING: ICD-10-CM

## 2022-12-28 NOTE — TELEPHONE ENCOUNTER
Spoke to patient, she is having her period for 20 days  Says she is changing a pad every 2-3 days  Offered an appt tomorrow to be seen but unable to make it  Patient scheduled for her yearly but also sent her for labs/us to complete  Told her we would be in touch with results  Patient denies any lightheaded, SOB, dizziness at this time

## 2023-02-23 NOTE — PROGRESS NOTES
Assessment/Plan   Problem List Items Addressed This Visit        Other    Encounter for gynecological examination - Primary    Relevant Orders    Liquid-based pap, screening   Other Visit Diagnoses     Routine screening for STI (sexually transmitted infection)        Relevant Orders    Chlamydia/GC amplified DNA by PCR          Discussion  I have discussed the importance of monthly self-breast exams, exercise and healthy die  Encourage safe sexual practices; STI testing - desires    Contraception - LAVELLE; She desires continuing on Ortho-tricylcen until her pill packs are complete in the next 2 months  If the irregular menses continues, she will consider changing BC  Reviewed various options with her today including different LAVELLE, implants, nuvaring, and progesterone-only options  The current ASCCP guidelines were reviewed  Patient's last pap was 2019 and therefore, a pap with HPV cotesting is not indicated at this time  I emphasized the importance of an annual pelvic and breast exam  Patient opts to have pap today due to AUB  Breast cancer screening is not indicated at this time  Encouraged patient to complete blood work that was ordered in 12/2022  All questions have been answered to her satisfaction  RTO for APE or sooner if needed      Subjective     HPI   Bird Chew is a 28 y o  female who presents for annual well woman exam       LMP - 2/19/23 ; She reports that she bled for 21 days in December December 3-5 was light regular period  Then it restarted on Dec 12- Jan 2 it was a nonstop flow that was heavy and dark  Then she had her period again on Jan 21 and a normal one in Feb Jan 21st and Feb 19 period lasted approximately 7 days with normal flow  Pelvic US 2/15/22 showed intramural fundal fibroid  She reports taking her LAVELLE every day at the same time of day  Recent blood work was done 04/2022 with TSH/CMP/CBC  Orders were placed in 12/2022 for hormones and other blood work  +cramping with periods  No vulvar itch/burn; No vaginal itch/burn; No abn discharge or odor; No urinary sx - burning/pain/frequency/hematuria    (+) SBEs - no breast masses, asymmetry, nipple discharge or bleeding, changes in skin of breast, or breast tenderness bilaterally    No abd/pelvic pain or HAs; No menopausal symptoms: No hot flashes/night sweats, problems with intercourse, vaginal dryness; sleeping well;     Pt is sexually active in a mutually monog/ sexual relationship; No issues with intercourse;  Feels safe at home  Current contraception: LAVELLE  Condom use: no     (+) PCP for routine Bw/care; Last Pap - 2019 NILM/HPV-  History of abnormal Pap smear: no    Review of Systems   Constitutional: Negative for fatigue  Eyes: Negative for photophobia and visual disturbance  Respiratory: Negative for cough and shortness of breath  Cardiovascular: Negative for chest pain and palpitations  Gastrointestinal: Negative for abdominal pain, blood in stool, constipation, diarrhea, nausea and rectal pain  Genitourinary: Positive for menstrual problem  Negative for dyspareunia, dysuria, flank pain, frequency, genital sores, pelvic pain, urgency, vaginal bleeding, vaginal discharge and vaginal pain  Musculoskeletal: Negative for arthralgias and back pain  Skin: Negative for rash  Neurological: Negative for weakness and headaches         The following portions of the patient's history were reviewed and updated as appropriate: allergies, current medications, past family history, past medical history, past social history, past surgical history and problem list          OB History        2    Para   1    Term   1            AB   1    Living   1       SAB   1    IAB        Ectopic        Multiple        Live Births   1           Obstetric Comments   MENARCHE- AGE 11             Past Medical History:   Diagnosis Date   • Asthma    • Diabetes mellitus ([facility] [address] )    • Enlarged thyroid    • Uterine fibroid        Past Surgical History:   Procedure Laterality Date   • TOOTH EXTRACTION  2005       Family History   Problem Relation Age of Onset   • No Known Problems Mother    • Diabetes Father    • Hypertension Father    • Liver cancer Maternal Grandmother    • Mental illness Maternal Grandmother    • Hypertension Paternal Grandmother    • Diabetes Paternal Grandfather    • Asthma Paternal Aunt    • Asthma Paternal Uncle        Social History     Socioeconomic History   • Marital status: Single     Spouse name: Not on file   • Number of children: Not on file   • Years of education: Not on file   • Highest education level: Not on file   Occupational History   • Occupation: [employer]    Tobacco Use   • Smoking status: Never   • Smokeless tobacco: Never   Vaping Use   • Vaping Use: Never used   Substance and Sexual Activity   • Alcohol use: Yes     Comment: SOCIAL    • Drug use: Yes     Types: Marijuana   • Sexual activity: Yes     Partners: Male     Birth control/protection: OCP   Other Topics Concern   • Not on file   Social History Narrative   • Not on file     Social Determinants of Health     Financial Resource Strain: Not on file   Food Insecurity: Not on file   Transportation Needs: Not on file   Physical Activity: Not on file   Stress: Not on file   Social Connections: Not on file   Intimate Partner Violence: Not on file   Housing Stability: Not on file         Current Outpatient Medications:   •  albuterol (2 5 mg/3 mL) 0 083 % nebulizer solution, inhale contents of 1 vial in nebulizer every 4 hours if needed for wheezing, Disp: , Rfl: 0  •  EPINEPHrine (EPIPEN) 0 3 mg/0 3 mL SOAJ, USE ASE DIRECTED BY MD, Disp: , Rfl: 0  •  norgestimate-ethinyl estradiol (ORTHO TRI-CYCLEN,TRINESSA) 0 18/0 215/0 25 MG-35 MCG per tablet, take 1 tablet by mouth once daily, Disp: 90 tablet, Rfl: 2  •  VENTOLIN  (90 Base) MCG/ACT inhaler, inhale 2 puff by inhalation route  every 8 hours as needed, Disp: , Rfl: 0  •  famotidine (PEPCID) 20 mg tablet, Take 1 tablet (20 mg total) by mouth in the morning and 1 tablet (20 mg total) in the evening  (Patient not taking: Reported on 2/28/2023), Disp: 30 tablet, Rfl: 0  •  metFORMIN (GLUCOPHAGE) 500 mg tablet, Take 1 tablet daily with largest meal (Patient not taking: Reported on 2/28/2023), Disp: 30 tablet, Rfl: 5  •  ondansetron (ZOFRAN) 4 mg tablet, Take 1 tablet (4 mg total) by mouth every 8 (eight) hours as needed for nausea or vomiting (Patient not taking: Reported on 2/28/2023), Disp: 10 tablet, Rfl: 0  •  phentermine (Adipex-P) 37 5 MG tablet, Take 1 tablet (37 5 mg total) by mouth in the morning (Patient not taking: Reported on 2/28/2023), Disp: 30 tablet, Rfl: 0    Allergies   Allergen Reactions   • Grape (Artificial) Flavor - Food Allergy Anaphylaxis, Hives, Itching and Shortness Of Breath   • Pollen Extract        Objective   Vitals:    02/28/23 1257   BP: 110/70   BP Location: Left arm   Patient Position: Sitting   Cuff Size: Large   Weight: 87 1 kg (192 lb)   Height: 5' 2" (1 575 m)     Physical Exam  Vitals and nursing note reviewed  Constitutional:       Appearance: Normal appearance  She is well-developed and normal weight  HENT:      Head: Normocephalic and atraumatic  Eyes:      Conjunctiva/sclera: Conjunctivae normal    Cardiovascular:      Rate and Rhythm: Normal rate and regular rhythm  Heart sounds: Normal heart sounds  Pulmonary:      Effort: Pulmonary effort is normal       Breath sounds: Normal breath sounds  Chest:   Breasts:     Breasts are symmetrical       Right: Normal  No inverted nipple, mass, nipple discharge, skin change or tenderness  Left: Normal  No inverted nipple, mass, nipple discharge, skin change or tenderness  Abdominal:      General: Abdomen is flat  There is no distension  Palpations: Abdomen is soft  There is no mass  Tenderness: There is no abdominal tenderness   There is no right CVA tenderness or left CVA tenderness  Genitourinary:     General: Normal vulva  Exam position: Lithotomy position  Pubic Area: No rash or pubic lice  Labia:         Right: No rash or tenderness  Left: No rash or tenderness  Urethra: No urethral pain  Vagina: Normal  No vaginal discharge  Cervix: Normal       Uterus: Normal  No uterine prolapse  Adnexa: Right adnexa normal and left adnexa normal         Right: No mass or tenderness  Left: No mass or tenderness  Musculoskeletal:         General: No tenderness  Normal range of motion  Cervical back: Normal range of motion  No tenderness  Right lower leg: No edema  Left lower leg: No edema  Lymphadenopathy:      Cervical: No cervical adenopathy  Upper Body:      Right upper body: No supraclavicular or axillary adenopathy  Left upper body: No supraclavicular or axillary adenopathy  Lower Body: No right inguinal adenopathy  No left inguinal adenopathy  Skin:     General: Skin is warm and dry  Neurological:      Mental Status: She is alert and oriented to person, place, and time  Motor: No weakness  Psychiatric:         Mood and Affect: Mood normal          Behavior: Behavior normal          Thought Content: Thought content normal          Judgment: Judgment normal          There are no Patient Instructions on file for this visit

## 2023-02-28 ENCOUNTER — ANNUAL EXAM (OUTPATIENT)
Dept: OBGYN CLINIC | Facility: CLINIC | Age: 33
End: 2023-02-28

## 2023-02-28 VITALS
DIASTOLIC BLOOD PRESSURE: 70 MMHG | SYSTOLIC BLOOD PRESSURE: 110 MMHG | HEIGHT: 62 IN | WEIGHT: 192 LBS | BODY MASS INDEX: 35.33 KG/M2

## 2023-02-28 DIAGNOSIS — Z01.419 ENCOUNTER FOR GYNECOLOGICAL EXAMINATION: Primary | ICD-10-CM

## 2023-02-28 DIAGNOSIS — Z11.3 ROUTINE SCREENING FOR STI (SEXUALLY TRANSMITTED INFECTION): ICD-10-CM

## 2023-03-01 LAB
C TRACH DNA SPEC QL NAA+PROBE: NEGATIVE
N GONORRHOEA DNA SPEC QL NAA+PROBE: NEGATIVE

## 2023-03-02 DIAGNOSIS — E66.9 CLASS 2 OBESITY WITH BODY MASS INDEX (BMI) OF 36.0 TO 36.9 IN ADULT, UNSPECIFIED OBESITY TYPE, UNSPECIFIED WHETHER SERIOUS COMORBIDITY PRESENT: ICD-10-CM

## 2023-03-02 DIAGNOSIS — R73.01 IMPAIRED FASTING GLUCOSE: ICD-10-CM

## 2023-03-03 LAB
HPV HR 12 DNA CVX QL NAA+PROBE: NEGATIVE
HPV16 DNA CVX QL NAA+PROBE: NEGATIVE
HPV18 DNA CVX QL NAA+PROBE: NEGATIVE

## 2023-03-03 RX ORDER — PHENTERMINE HYDROCHLORIDE 37.5 MG/1
37.5 TABLET ORAL DAILY
Qty: 30 TABLET | Refills: 0 | Status: SHIPPED | OUTPATIENT
Start: 2023-03-03

## 2023-03-07 LAB
LAB AP GYN PRIMARY INTERPRETATION: NORMAL
Lab: NORMAL

## 2023-04-14 ENCOUNTER — TELEPHONE (OUTPATIENT)
Dept: FAMILY MEDICINE CLINIC | Facility: CLINIC | Age: 33
End: 2023-04-14

## 2023-04-14 DIAGNOSIS — Z30.011 ENCOUNTER FOR INITIAL PRESCRIPTION OF CONTRACEPTIVE PILLS: Primary | ICD-10-CM

## 2023-04-14 RX ORDER — DROSPIRENONE AND ETHINYL ESTRADIOL 0.02-3(28)
1 KIT ORAL DAILY
Qty: 84 TABLET | Refills: 0 | Status: SHIPPED | OUTPATIENT
Start: 2023-04-14 | End: 2023-07-05

## 2023-05-10 ENCOUNTER — TELEPHONE (OUTPATIENT)
Dept: BARIATRICS | Facility: CLINIC | Age: 33
End: 2023-05-10

## 2023-05-10 NOTE — TELEPHONE ENCOUNTER
Called patient and left a message to give the office a call back to schedule a NP MWM appointment since she does not qualify for Sx per Maite

## 2023-06-08 ENCOUNTER — OFFICE VISIT (OUTPATIENT)
Dept: FAMILY MEDICINE CLINIC | Facility: CLINIC | Age: 33
End: 2023-06-08
Payer: COMMERCIAL

## 2023-06-08 VITALS
BODY MASS INDEX: 33.93 KG/M2 | WEIGHT: 184.38 LBS | SYSTOLIC BLOOD PRESSURE: 120 MMHG | HEIGHT: 62 IN | TEMPERATURE: 98.7 F | HEART RATE: 92 BPM | OXYGEN SATURATION: 98 % | RESPIRATION RATE: 16 BRPM | DIASTOLIC BLOOD PRESSURE: 62 MMHG

## 2023-06-08 DIAGNOSIS — N64.4 BILATERAL MASTODYNIA: Primary | ICD-10-CM

## 2023-06-08 DIAGNOSIS — M54.9 CHRONIC BACK PAIN, UNSPECIFIED BACK LOCATION, UNSPECIFIED BACK PAIN LATERALITY: ICD-10-CM

## 2023-06-08 DIAGNOSIS — G89.29 CHRONIC BACK PAIN, UNSPECIFIED BACK LOCATION, UNSPECIFIED BACK PAIN LATERALITY: ICD-10-CM

## 2023-06-08 DIAGNOSIS — R21 RASH: ICD-10-CM

## 2023-06-08 DIAGNOSIS — N92.6 IRREGULAR MENSES: ICD-10-CM

## 2023-06-08 PROCEDURE — 99214 OFFICE O/P EST MOD 30 MIN: CPT | Performed by: INTERNAL MEDICINE

## 2023-06-08 RX ORDER — FLUCONAZOLE 150 MG/1
TABLET ORAL
Qty: 4 TABLET | Refills: 0 | Status: SHIPPED | OUTPATIENT
Start: 2023-06-08 | End: 2023-06-08

## 2023-06-08 RX ORDER — NYSTATIN 100000 [USP'U]/G
POWDER TOPICAL 3 TIMES DAILY
Qty: 15 G | Refills: 5 | Status: SHIPPED | OUTPATIENT
Start: 2023-06-08

## 2023-06-08 NOTE — PROGRESS NOTES
Assessment/Plan:Pt really is in need of breast reduction surgery secondary to severe neck, back, shoulder pain due to large breast size, pain in breasts, inhitibition of ADLS-pt referred to Plastics today         Problem List Items Addressed This Visit        Other    Irregular menses   Other Visit Diagnoses     Bilateral mastodynia    -  Primary    Relevant Orders    Ambulatory Referral to Plastic Surgery    Chronic back pain, unspecified back location, unspecified back pain laterality        Rash        Relevant Medications    nystatin (MYCOSTATIN) powder    fluconazole (DIFLUCAN) 150 mg tablet            Subjective:      Patient ID: Vashti Zayas is a 28 y o  female  [facility] here because she has a very bad rash underneath of her breasts, also has a lot of back, neck and shoulder pain secondary to the very large breasts-wears 38 DDD and tells me how hard it is to find bras that fit her-she's very embarrassed by her large breast size and does not feel like leaving her house much  Says the rashes she gets under her breast smell badly and cause a lot of pain in that area, burning etc-the large breast size makes exercising very difficult for her  Sleeping is also difficult as the breasts press on her chest wall and it wakes her up      The following portions of the patient's history were reviewed and updated as appropriate:   Past Medical History:  She has a past medical history of Allergic, Anxiety, Asthma, Clotting disorder ([facility] [address] ), Depression, Diabetes mellitus ([facility] [address] ), Eating disorder, Enlarged thyroid, Obesity, Uterine fibroid, and Visual impairment  ,  _______________________________________________________________________  Medical Problems:  does not have any pertinent problems on file ,  _______________________________________________________________________  Past Surgical History:   has a past surgical history that includes Tooth extraction (2005)  ,  _______________________________________________________________________  Family History:  family history includes Asthma in her paternal aunt and paternal uncle; Diabetes in her father and paternal grandfather; Hypertension in her father and paternal grandmother; Liver cancer in her maternal grandmother; Mental illness in her maternal grandmother; No Known Problems in her mother ,  _______________________________________________________________________  Social History:   reports that she has quit smoking  Her smoking use included cigarettes  She has a 5 00 pack-year smoking history  She has never used smokeless tobacco  She reports current alcohol use of about 2 0 - 3 0 standard drinks of alcohol per week  She reports current drug use  Drug: Marijuana  ,  _______________________________________________________________________  Allergies:  is allergic to grape (artificial) flavor - food allergy and pollen extract     _______________________________________________________________________  Current Outpatient Medications   Medication Sig Dispense Refill   • albuterol (2 5 mg/3 mL) 0 083 % nebulizer solution inhale contents of 1 vial in nebulizer every 4 hours if needed for wheezing  0   • drospirenone-ethinyl estradiol (DAYTON) 3-0 02 MG per tablet Take 1 tablet by mouth daily 84 tablet 0   • EPINEPHrine (EPIPEN) 0 3 mg/0 3 mL SOAJ USE ASE DIRECTED BY MD  0   • fluconazole (DIFLUCAN) 150 mg tablet Take 1 tablet PO X 1 then can repeat in 5-7 days if needed 4 tablet 0   • nystatin (MYCOSTATIN) powder Apply topically 3 (three) times a day 15 g 5   • phentermine (Adipex-P) 37 5 MG tablet Take 1 tablet (37 5 mg total) by mouth in the morning 30 tablet 0   • VENTOLIN  (90 Base) MCG/ACT inhaler inhale 2 puff by inhalation route  every 8 hours as needed  0     No current facility-administered medications for this visit      _______________________________________________________________________  Review of Systems "  Constitutional: Negative for fatigue  Musculoskeletal: Positive for arthralgias, back pain, myalgias and neck pain  Objective:  Vitals:    06/08/23 1408   BP: 120/62   BP Location: Left arm   Patient Position: Sitting   Cuff Size: Large   Pulse: 92   Resp: 16   Temp: 98 7 °F (37 1 °C)   TempSrc: Tympanic   SpO2: 98%   Weight: 83 6 kg (184 lb 6 oz)   Height: 5' 2\" (1 575 m)     Body mass index is 33 72 kg/m²  Physical Exam  Constitutional:       Appearance: Normal appearance  She is obese  HENT:      Head: Normocephalic and atraumatic  Right Ear: External ear normal       Left Ear: External ear normal       Nose: Nose normal       Mouth/Throat:      Mouth: Mucous membranes are moist    Neck:      Comments: Possible thyromegaly  Cardiovascular:      Rate and Rhythm: Regular rhythm  Heart sounds: Normal heart sounds  Comments: Very large breast size  Pulmonary:      Effort: Pulmonary effort is normal       Breath sounds: Normal breath sounds  Skin:     Capillary Refill: Capillary refill takes less than 2 seconds  Findings: Erythema and rash present  Neurological:      General: No focal deficit present  Mental Status: She is alert and oriented to person, place, and time     Psychiatric:         Mood and Affect: Mood normal          "

## 2023-06-12 ENCOUNTER — TELEPHONE (OUTPATIENT)
Dept: OTHER | Facility: OTHER | Age: 33
End: 2023-06-12

## 2023-06-12 NOTE — TELEPHONE ENCOUNTER
Pt  Was referred here by her doctor and she needs to make an appointment  Pt  Is requesting a call back       Ty

## 2023-06-21 ENCOUNTER — TELEPHONE (OUTPATIENT)
Dept: PLASTIC SURGERY | Facility: CLINIC | Age: 33
End: 2023-06-21

## 2023-06-21 NOTE — TELEPHONE ENCOUNTER
Lm for patient to call to discuss criteria for breast reduction  One note from 6/8/2023 and script for nystatin with 5 refills entered same date  Need two more months of documentation and 3 months of pt/chiro

## 2023-06-22 ENCOUNTER — TELEPHONE (OUTPATIENT)
Dept: PLASTIC SURGERY | Facility: CLINIC | Age: 33
End: 2023-06-22

## 2023-06-22 DIAGNOSIS — M54.6 CHRONIC THORACIC BACK PAIN, UNSPECIFIED BACK PAIN LATERALITY: Primary | ICD-10-CM

## 2023-06-22 DIAGNOSIS — G89.29 CHRONIC THORACIC BACK PAIN, UNSPECIFIED BACK PAIN LATERALITY: Primary | ICD-10-CM

## 2023-06-22 DIAGNOSIS — M54.2 NECK PAIN: ICD-10-CM

## 2023-06-22 NOTE — TELEPHONE ENCOUNTER
Reviewed criteria with patient for breast reduction, states she has physician documentation and will begin process for physical therapy  Will let me know when 3 months of pt are done to then make appt

## 2023-07-05 DIAGNOSIS — Z30.011 ENCOUNTER FOR INITIAL PRESCRIPTION OF CONTRACEPTIVE PILLS: ICD-10-CM

## 2023-07-05 RX ORDER — DROSPIRENONE AND ETHINYL ESTRADIOL 0.02-3(28)
1 KIT ORAL DAILY
Qty: 84 TABLET | Refills: 0 | Status: SHIPPED | OUTPATIENT
Start: 2023-07-05 | End: 2023-09-27

## 2023-07-08 DIAGNOSIS — Z30.011 ENCOUNTER FOR INITIAL PRESCRIPTION OF CONTRACEPTIVE PILLS: ICD-10-CM

## 2023-07-08 DIAGNOSIS — E66.9 CLASS 2 OBESITY WITH BODY MASS INDEX (BMI) OF 36.0 TO 36.9 IN ADULT, UNSPECIFIED OBESITY TYPE, UNSPECIFIED WHETHER SERIOUS COMORBIDITY PRESENT: ICD-10-CM

## 2023-07-08 RX ORDER — DROSPIRENONE AND ETHINYL ESTRADIOL 0.02-3(28)
1 KIT ORAL DAILY
Qty: 84 TABLET | Refills: 0 | Status: CANCELLED | OUTPATIENT
Start: 2023-07-08 | End: 2023-09-30

## 2023-07-10 RX ORDER — PHENTERMINE HYDROCHLORIDE 37.5 MG/1
37.5 TABLET ORAL DAILY
Qty: 30 TABLET | Refills: 0 | Status: SHIPPED | OUTPATIENT
Start: 2023-07-10

## 2023-07-10 NOTE — TELEPHONE ENCOUNTER
Medication refill requested: Jolie  Last office visit: 2/28/23  Next office visit: not due until 2/24  Last refilled: 7/2/23 #84x0  Ordering Provider: Antonio England, [address] (select pharmacy send RX to):   [id number] [place] Tejada, [address] [facility] [place] [address]  Phone: [phone] Fax: [phone]    Refill was sent to [facility] on [facility] [place] on 7/5/23 for #84x0. Placed call to [facility], unable to be connected to the pharmacy. [employer] message sent to patient. Refill denied as it was sent on 7/5/23.

## 2023-07-13 ENCOUNTER — OFFICE VISIT (OUTPATIENT)
Dept: FAMILY MEDICINE CLINIC | Facility: CLINIC | Age: 33
End: 2023-07-13
Payer: COMMERCIAL

## 2023-07-13 VITALS
TEMPERATURE: 98.9 F | DIASTOLIC BLOOD PRESSURE: 82 MMHG | SYSTOLIC BLOOD PRESSURE: 118 MMHG | BODY MASS INDEX: 34.3 KG/M2 | RESPIRATION RATE: 16 BRPM | HEART RATE: 89 BPM | OXYGEN SATURATION: 97 % | HEIGHT: 62 IN | WEIGHT: 186.38 LBS

## 2023-07-13 DIAGNOSIS — Z12.4 CERVICAL CANCER SCREENING: ICD-10-CM

## 2023-07-13 DIAGNOSIS — J45.20 MILD INTERMITTENT ASTHMA WITHOUT COMPLICATION: ICD-10-CM

## 2023-07-13 DIAGNOSIS — N62 LARGE BREASTS: ICD-10-CM

## 2023-07-13 DIAGNOSIS — Z28.21 HUMAN PAPILLOMA VIRUS (HPV) VACCINATION DECLINED: ICD-10-CM

## 2023-07-13 DIAGNOSIS — Z00.00 ANNUAL PHYSICAL EXAM: Primary | ICD-10-CM

## 2023-07-13 DIAGNOSIS — Z13.220 LIPID SCREENING: ICD-10-CM

## 2023-07-13 PROCEDURE — 99395 PREV VISIT EST AGE 18-39: CPT | Performed by: INTERNAL MEDICINE

## 2023-07-13 NOTE — PROGRESS NOTES
[facility]    NAME: Maddy Milton  AGE: 28 y.o. SEX: female  : 1990     DATE: 2023     Assessment and Plan:     Problem List Items Addressed This Visit        Respiratory    Asthma   Other Visit Diagnoses     Annual physical exam    -  Primary    Lipid screening        Cervical cancer screening        UTD and normal    Large breasts              Immunizations and preventive care screenings were discussed with patient today. Appropriate education was printed on patient's after visit summary. Counseling:  Alcohol/drug use: discussed moderation in alcohol intake, the recommendations for healthy alcohol use, and avoidance of illicit drug use. Dental Health: discussed importance of regular tooth brushing, flossing, and dental visits. · Exercise: the importance of regular exercise/physical activity was discussed. Recommend exercise 3-5 times per week for at least 30 minutes. Depression Screening and Follow-up Plan: Patient was screened for depression during today's encounter. They screened negative with a PHQ-2 score of 0. Return in about 1 year (around 2024) for Annual physical.     Chief Complaint:     Chief Complaint   Patient presents with   • Physical Exam      History of Present Illness:     Adult Annual Physical   Patient here for a comprehensive physical exam. The patient reports problems - large breast size (38 DDD) and neck and back pain. Diet and Physical Activity  · Diet/Nutrition: well balanced diet. · Exercise: walking. Depression Screening  PHQ-2/9 Depression Screening    Little interest or pleasure in doing things: 0 - not at all  Feeling down, depressed, or hopeless: 0 - not at all  PHQ-2 Score: 0  PHQ-2 Interpretation: Negative depression screen       General Health  · Sleep: sleeps well. · Hearing: normal - bilateral.  · Vision: no vision problems.    · Dental: regular dental visits. /GYN Health  · Last menstrual period: monthly  · Contraceptive method: oral contraceptives. · History of STDs?: no.     Review of Systems:     Review of Systems   Constitutional: Negative. Respiratory: Negative. Cardiovascular: Negative. Very large breast size   Gastrointestinal: Negative. Musculoskeletal: Positive for back pain and neck pain. Heaviness from breasts on chest area   Skin: Negative. Neurological: Negative. Hematological: Negative. Psychiatric/Behavioral: Negative. Past Medical History:     Past Medical History:   Diagnosis Date   • Allergic    • Anxiety    • Asthma    • Clotting disorder ([address])    • Depression    • Diabetes mellitus ([address])    • Eating disorder    • Enlarged thyroid    • Obesity    • Uterine fibroid    • Visual impairment       Past Surgical History:     Past Surgical History:   Procedure Laterality Date   • TOOTH EXTRACTION  2005      Social History:     Social History     Socioeconomic History   • Marital status: Single     Spouse name: None   • Number of children: None   • Years of education: None   • Highest education level: None   Occupational History   • Occupation: [employer]    Tobacco Use   • Smoking status: Former     Packs/day: 0.50     Years: 10.00     Total pack years: 5.00     Types: Cigarettes   • Smokeless tobacco: Never   Vaping Use   • Vaping Use: Never used   Substance and Sexual Activity   • Alcohol use:  Yes     Alcohol/week: 2.0 - 3.0 standard drinks of alcohol     Types: 2 - 3 Standard drinks or equivalent per week     Comment: SOCIAL    • Drug use: Yes     Types: Marijuana   • Sexual activity: Yes     Partners: Male     Birth control/protection: OCP   Other Topics Concern   • None   Social History Narrative   • None     Social Determinants of Health     Financial Resource Strain: Not on file   Food Insecurity: Not on file   Transportation Needs: Not on file   Physical Activity: Not on file Stress: Not on file   Social Connections: Not on file   Intimate Partner Violence: Not on file   Housing Stability: Not on file      Family History:     Family History   Problem Relation Age of Onset   • No Known Problems Mother    • Diabetes Father    • Hypertension Father    • Liver cancer Maternal Grandmother    • Mental illness Maternal Grandmother    • Hypertension Paternal Grandmother    • Diabetes Paternal Grandfather    • Asthma Paternal Aunt    • Asthma Paternal Uncle       Current Medications:     Current Outpatient Medications   Medication Sig Dispense Refill   • albuterol (2.5 mg/3 mL) 0.083 % nebulizer solution inhale contents of 1 vial in nebulizer every 4 hours if needed for wheezing  0   • drospirenone-ethinyl estradiol (DAYTON) 3-0.02 MG per tablet take 1 tablet by mouth daily 84 tablet 0   • EPINEPHrine (EPIPEN) 0.3 mg/0.3 mL SOAJ USE ASE DIRECTED BY MD  0   • nystatin (MYCOSTATIN) powder Apply topically 3 (three) times a day 15 g 5   • phentermine (Adipex-P) 37.5 MG tablet Take 1 tablet (37.5 mg total) by mouth in the morning 30 tablet 0   • VENTOLIN  (90 Base) MCG/ACT inhaler inhale 2 puff by inhalation route  every 8 hours as needed  0     No current facility-administered medications for this visit. Allergies: Allergies   Allergen Reactions   • Grape (Artificial) Flavor - Food Allergy Anaphylaxis, Hives, Itching and Shortness Of Breath   • Pollen Extract       Physical Exam:     /82 (BP Location: Left arm, Patient Position: Sitting, Cuff Size: Adult)   Pulse 89   Temp 98.9 °F (37.2 °C) (Tympanic)   Resp 16   Ht 5' 2" (1.575 m)   Wt 84.5 kg (186 lb 6 oz)   SpO2 97%   BMI 34.09 kg/m²     Physical Exam  Constitutional:       Appearance: Normal appearance. She is obese. HENT:      Head: Normocephalic and atraumatic.       Right Ear: External ear normal.      Left Ear: External ear normal.      Nose: Nose normal.      Mouth/Throat:      Mouth: Mucous membranes are moist. Eyes:      Extraocular Movements: Extraocular movements intact. Cardiovascular:      Rate and Rhythm: Normal rate and regular rhythm. Heart sounds: Normal heart sounds. Pulmonary:      Effort: Pulmonary effort is normal.      Breath sounds: Normal breath sounds. Abdominal:      Palpations: Abdomen is soft. Musculoskeletal:         General: Normal range of motion. Cervical back: Normal range of motion and neck supple. Skin:     General: Skin is warm. Capillary Refill: Capillary refill takes less than 2 seconds. Comments: Very large breast size   Neurological:      General: No focal deficit present. Mental Status: She is alert and oriented to person, place, and time. Cranial Nerves: No cranial nerve deficit. Psychiatric:         Mood and Affect: Mood normal.         Thought Content:  Thought content normal.         Judgment: Judgment normal.          Nash Shaikh MD   [facility]

## 2023-07-13 NOTE — ASSESSMENT & PLAN NOTE
Causing a lot of back, neck and shoulder pain, as well as difficulty sleeping because of weight of breasts-is participating in a daily exercise program, eating healthy and trying to lose weight and wearing sports bras for support-is also going to be going to Physical Therapy 2-3 X per week

## 2023-09-07 ENCOUNTER — EVALUATION (OUTPATIENT)
Dept: PHYSICAL THERAPY | Facility: REHABILITATION | Age: 33
End: 2023-09-07
Payer: COMMERCIAL

## 2023-09-07 DIAGNOSIS — G89.29 CHRONIC THORACIC BACK PAIN, UNSPECIFIED BACK PAIN LATERALITY: ICD-10-CM

## 2023-09-07 DIAGNOSIS — M54.6 CHRONIC THORACIC BACK PAIN, UNSPECIFIED BACK PAIN LATERALITY: ICD-10-CM

## 2023-09-07 DIAGNOSIS — M54.2 NECK PAIN: ICD-10-CM

## 2023-09-07 PROCEDURE — 97161 PT EVAL LOW COMPLEX 20 MIN: CPT

## 2023-09-07 PROCEDURE — 97110 THERAPEUTIC EXERCISES: CPT

## 2023-09-07 NOTE — PROGRESS NOTES
PT Evaluation     Today's date: 2023  Patient name: Justin Figueroa  : 1990  MRN: [id number]  Referring provider: Cory Desai MD  Dx:   Encounter Diagnosis     ICD-10-CM    1. Chronic thoracic back pain, unspecified back pain laterality  M54.6 Ambulatory Referral to Physical Therapy    G89.29       2. Neck pain  M54.2 Ambulatory Referral to Physical Therapy                     Assessment  Assessment details: Pt is a 28 y.o. female who presents to OP PT for IE following referral for cervical and thoracic pain. Pt c/o chronic lumbar and cervical pain (>10 years). Upon formal assessment pt demonstrates mild cervical and lumbar ROM restrictions, primarily into flexion and w/ R rotation and sidebending. No gross strength deficits found at IE. May benefit from deep cervical flexion endurance testing at f/u visits. TTP, hypertonic, and trigger points noted t/o B UT, LS, AS, and cervical paraspinals. Reports relief in symptoms following manual techniques. Given these findings pt is recommended for skilled PT intervention 2x wk. Pt agreeable to POC. HEP given and completed in clinic, handout provided. Began education on chronic pain and potential benefits of pain management consultation; would benefit from education reinforcement. Understanding of Dx/Px/POC: good   Prognosis: good    Goals  STGs (to be achieved within 2-4 weeks)  1. Chronic pain education will be completed and pt will verbalize understanding of how PT can help manage symptoms. 2. Pt will improve B HS, UT, and LS flexibility to help decrease pain and increase ease w/ functional mobility. LTGs (to be achieved within 6-8 weeks)   1. Pt will be I with HEP at d/c to promote PT carry-over. 2. Pt will meet FOTO predicted score. 3. Pt will improve cervical and lumbar ROM to WNL and pain-free to increase ease w/ functional mobility.    4. Pt will be able to walk son to and from school w/o significant increases in pain.      Plan  Planned modality interventions: unattended electrical stimulation, ultrasound, traction, thermotherapy: hydrocollator packs and cryotherapy  Planned therapy interventions: manual therapy, neuromuscular re-education, patient education, therapeutic activities and therapeutic exercise  Frequency: 2x week  Duration in weeks: 8        Subjective Evaluation    History of Present Illness  Mechanism of injury: Pt c/o back and neck pain. Symptoms have been present for several years (since she was in high school, she had PT here for this in the past). Imaging: none completed to date. Endorses occasional B UE and LE radicular symptoms. Occurs when pain in spine worsens, cannot attribute to positions of motions. Feels like there is a "ball of pressure on back of neck" which is the most annoying thing right now. Does acknowledge breast size may be contributing to the pain. Aggravating factors include: a lot of movement/activity, the cold/winter  Alleviating factors include: OTC medication (Advil - gives some relief), heat  Functionally pt is having greatest difficulty with getting out of bed and other daily tasks. Has been trying to practice energy conservation because of the pain. Is also having difficulty sleeping b/c of pain, has tried multiple pillows and mattresses but hasn't been successful in finding anything comfortable. Has not been chiropractor. Gets massages 1-2x per month. Has family history of substance abuse/addiction and is hesitant to trial any pharmaceutical management. Objective     Palpation   Left   Hypertonic in the levator scapulae and upper trapezius. Tenderness of the cervical paraspinals, levator scapulae, scalenes, suboccipitals and upper trapezius. Trigger point to levator scapulae, scalenes and upper trapezius. Right   Hypertonic in the levator scapulae and upper trapezius.    Tenderness of the cervical paraspinals, levator scapulae, scalenes, suboccipitals and upper trapezius. Trigger point to levator scapulae, scalenes and upper trapezius. Active Range of Motion   Cervical/Thoracic Spine       Cervical    Flexion:  with pain  Extension:  with pain  Left lateral flexion:  with pain Restriction level: minimal  Right lateral flexion: Neck active lateral bend right: "feels tighter than the other side"     with pain Restriction level minimal  Left rotation:  with pain Restriction level: minimal  Right rotation:  [facility]    Thoracic    Left lateral flexion:  WFL  Right lateral flexion:  WFL    Lumbar   Flexion:  with pain Restriction level: moderate  Extension: Active lumbar extension: "always relieves the pressure"  WFL  Left lateral flexion:  WFL  Right lateral flexion:  WFL  Left rotation:  WFL  Right rotation:  [facility]    Strength/Myotome Testing   Cervical Spine     Left   Normal strength    Right   Normal strength    Lumbar   Left   Normal strength    Right   Normal strength    Tests     Lumbar     Left   Negative crossed SLR and passive SLR. Right   Negative crossed SLR and passive SLR. Additional Tests Details  Hamstring flexibility (SLR passive hip flexion measure)     L: 75 degrees     R: 84 degrees             Precautions: asthma, clotting disorder, DMII  Cannot tolerate supine for >5min at a time    * indicates included in HEP:   Access Code: [email or web address]  URL: [email or web address]    Manuals 9/7            STM B UT, LS, paraspinals In sitting VR                                                   Neuro Re-Ed             Chin tucks             TB mid rows             TB low rows             Core stabilization                                                    Ther Ex             Pt education POC, HEP, began chronic pain education, potential benefits of pain management consult            UBE             Open book stretch * 10x10" b/l            Supine HS stretch * 3x30" b/l            LS stretch (no OP) * 3x30" b/l            UT stretch Thoracic foam roll progression             SAMUEL educated in            Deep cervical flexion endurance testing NV ?                                       Ther Activity                                       Gait Training                                       Modalities

## 2023-09-11 ENCOUNTER — OFFICE VISIT (OUTPATIENT)
Dept: PHYSICAL THERAPY | Facility: REHABILITATION | Age: 33
End: 2023-09-11
Payer: COMMERCIAL

## 2023-09-11 ENCOUNTER — TELEPHONE (OUTPATIENT)
Dept: FAMILY MEDICINE CLINIC | Facility: CLINIC | Age: 33
End: 2023-09-11

## 2023-09-11 DIAGNOSIS — M54.6 PAIN IN THORACIC SPINE: ICD-10-CM

## 2023-09-11 DIAGNOSIS — M54.6 CHRONIC THORACIC BACK PAIN, UNSPECIFIED BACK PAIN LATERALITY: Primary | ICD-10-CM

## 2023-09-11 DIAGNOSIS — M54.2 NECK PAIN: Primary | ICD-10-CM

## 2023-09-11 DIAGNOSIS — M54.2 NECK PAIN: ICD-10-CM

## 2023-09-11 DIAGNOSIS — G89.29 CHRONIC THORACIC BACK PAIN, UNSPECIFIED BACK PAIN LATERALITY: Primary | ICD-10-CM

## 2023-09-11 PROCEDURE — 97112 NEUROMUSCULAR REEDUCATION: CPT

## 2023-09-11 PROCEDURE — 97110 THERAPEUTIC EXERCISES: CPT

## 2023-09-11 PROCEDURE — 97140 MANUAL THERAPY 1/> REGIONS: CPT

## 2023-09-11 NOTE — PROGRESS NOTES
Daily Note     Today's date: 2023  Patient name: Kelsi Mclaughlin  : 1990  MRN: [id number]  Referring provider: Gamal Sadler MD  Dx:   Encounter Diagnosis     ICD-10-CM    1. Chronic thoracic back pain, unspecified back pain laterality  M54.6     G89.29       2. Neck pain  M54.2                      Subjective: Patient reports a pain level of 3/10 today in bilateral UT and upper back area. Objective: See treatment diary below      Assessment: Tolerated treatment well. Patient demonstrated fatigue post treatment, exhibited good technique with therapeutic exercises and would benefit from continued PT. Initiated UT stretches, postural stretches and strengthening activities with good tolerance. Discussed postures with using her phone. Patient verbalizes understanding. Plan: Continue per plan of care. Precautions: asthma, clotting disorder, DMII  Cannot tolerate supine for >5min at a time    * indicates included in HEP:   Access Code: [email or web address]  URL: [email or web address]    Manuals            STM B UT, LS, paraspinals In sitting VR KSG  B UT and upper back                                                  Neuro Re-Ed             Chin tucks  Seated 5" x10           TB mid rows  GTB 2x10           TB low rows  GTB 2x10           Core stabilization             Bridges  2x10                                     Ther Ex             Pt education POC, HEP, began chronic pain education, potential benefits of pain management consult            UBE  2'/2'           Open book stretch * 10x10" b/l 10x10"           Supine HS stretch * 3x30" b/l 3x30" b/l           LS stretch (no OP) * 3x30" b/l 10x10"           UT stretch  10x10"           Thoracic foam roll progression             SAMUEL educated in            Deep cervical flexion endurance testing NV ?             Seated thoracic extension w/1/2 foam roll  2x10           LTR  x10 ea           Lat stretch in kneeling with elbows on table  10" x10                                                  Ther Activity                                       Gait Training                                       Modalities

## 2023-09-14 ENCOUNTER — OFFICE VISIT (OUTPATIENT)
Dept: PHYSICAL THERAPY | Facility: REHABILITATION | Age: 33
End: 2023-09-14
Payer: COMMERCIAL

## 2023-09-14 DIAGNOSIS — M54.6 CHRONIC THORACIC BACK PAIN, UNSPECIFIED BACK PAIN LATERALITY: Primary | ICD-10-CM

## 2023-09-14 DIAGNOSIS — G89.29 CHRONIC THORACIC BACK PAIN, UNSPECIFIED BACK PAIN LATERALITY: Primary | ICD-10-CM

## 2023-09-14 DIAGNOSIS — M54.2 NECK PAIN: ICD-10-CM

## 2023-09-14 PROCEDURE — 97110 THERAPEUTIC EXERCISES: CPT

## 2023-09-14 PROCEDURE — 97140 MANUAL THERAPY 1/> REGIONS: CPT

## 2023-09-14 PROCEDURE — 97112 NEUROMUSCULAR REEDUCATION: CPT

## 2023-09-14 NOTE — PROGRESS NOTES
Daily Note     Today's date: 2023  Patient name: Ericka Bianchi  : 1990  MRN: [id number]  Referring provider: Chris Parra MD  Dx:   Encounter Diagnosis     ICD-10-CM    1. Chronic thoracic back pain, unspecified back pain laterality  M54.6     G89.29       2. Neck pain  M54.2           Start Time: 1400  Stop Time: 1450  Total time in clinic (min): 50 minutes    Subjective: Patient reports neck is bothering her today, reporting more left sided neck pain versus right. She reports back as "alright, it switches on and off with the neck." She reports no complaints after previous session and compliance with HEP. Objective: See treatment diary below      Assessment: Tolerated treatment well. Patient demonstrated fatigue post treatment, exhibited good technique with therapeutic exercises and would benefit from continued PT Patient reports relief with manuals today. Tolerated all TE performed well, trialed TA today with good tolerance and form, will continue to progress. Plan: Continue per plan of care. Progress treatment as tolerated. Precautions: asthma, clotting disorder, DMII  Cannot tolerate supine for >5min at a time    * indicates included in HEP:   Access Code: [email or web address]  URL: [email or web address]    Manuals           STM B UT, LS, paraspinals In sitting VR KSG  B UT and upper back Done                                                  Neuro Re-Ed             Chin tucks  Seated 5" x10 Seated 2x10x5''          TB mid rows  GTB 2x10 GTB 2x10          TB low rows  GTB 2x10 GTB 2x10          Core stabilization   TA 2x10          Bridges  2x10 2x10                                    Ther Ex             Pt education POC, HEP, began chronic pain education, potential benefits of pain management consult            UBE  2'/2' 5' retro 10W          Open book stretch * 10x10" b/l 10x10" 10x10'' ea          Supine HS stretch * 3x30" b/l 3x30" b/l 3x30'' b/l LS stretch (no OP) * 3x30" b/l 10x10" 10x10''          UT stretch  10x10" 10x10''          Thoracic foam roll progression             SAMUEL educated in            Deep cervical flexion endurance testing NV ?             Seated thoracic extension w/1/2 foam roll  2x10           LTR  x10 ea 10x5'' ea          Lat stretch in kneeling with elbows on table  10" x10                                                  Ther Activity                                       Gait Training                                       Modalities

## 2023-09-18 ENCOUNTER — OFFICE VISIT (OUTPATIENT)
Dept: PHYSICAL THERAPY | Facility: REHABILITATION | Age: 33
End: 2023-09-18
Payer: COMMERCIAL

## 2023-09-18 DIAGNOSIS — G89.29 CHRONIC THORACIC BACK PAIN, UNSPECIFIED BACK PAIN LATERALITY: Primary | ICD-10-CM

## 2023-09-18 DIAGNOSIS — M54.6 CHRONIC THORACIC BACK PAIN, UNSPECIFIED BACK PAIN LATERALITY: Primary | ICD-10-CM

## 2023-09-18 DIAGNOSIS — M54.2 NECK PAIN: ICD-10-CM

## 2023-09-18 PROCEDURE — 97140 MANUAL THERAPY 1/> REGIONS: CPT

## 2023-09-18 PROCEDURE — 97110 THERAPEUTIC EXERCISES: CPT

## 2023-09-18 PROCEDURE — 97112 NEUROMUSCULAR REEDUCATION: CPT

## 2023-09-18 NOTE — PROGRESS NOTES
Daily Note     Today's date: 2023  Patient name: Debbie Page  : 1990  MRN: [id number]  Referring provider: Opal Noonan MD  Dx:   Encounter Diagnosis     ICD-10-CM    1. Chronic thoracic back pain, unspecified back pain laterality  M54.6     G89.29       2. Neck pain  M54.2           Start Time: 1145  Stop Time: 1245  Total time in clinic (min): 60 minutes    Subjective: Patient reporting increased back and neck pain at start of session today. She reports no complaints after previous session and compliance with HEP. Objective: See treatment diary below      Assessment: Tolerated treatment well. Patient demonstrated fatigue post treatment, exhibited good technique with therapeutic exercises and would benefit from continued PT Good tolerance to additional TE and increased reps today. Patient reporting relief with manuals today. Patient given updated HEP today, reporting understanding. Plan: Continue per plan of care. Progress treatment as tolerated. Precautions: asthma, clotting disorder, DMII   Cannot tolerate supine for >5min at a time     * indicates included in HEP:   Access Code: [email or web address]  URL: [email or web address]    Manuals          STM B UT, LS, paraspinals In sitting VR KSG  B UT and upper back Done  Done                                                 Neuro Re-Ed             Chin tucks  Seated 5" x10 Seated 2x10x5'' Seated 2x10x5''         TB mid rows  GTB 2x10 GTB 2x10 GTB 3x10         TB low rows  GTB 2x10 GTB 2x10 GTB 3x10         Core stabilization   TA 2x10 TA 2x10         Bridges  2x10 2x10 3x10         TA+march    2x10 ea         Scapular retraction ER with TB    Prichard TB 2x10         Ther Ex             Pt education POC, HEP, began chronic pain education, potential benefits of pain management consult            UBE  2'/2' 5' retro 10W 5' retro 10W         Open book stretch * 10x10" b/l 10x10" 10x10'' ea 10x10'' ea Supine HS stretch * 3x30" b/l 3x30" b/l 3x30'' b/l 3x30'' b/l         LS stretch (no OP) * 3x30" b/l 10x10" 10x10'' 10x10''         UT stretch  10x10" 10x10'' 10x10''         Thoracic foam roll progression             SAMUEL educated in            Deep cervical flexion endurance testing NV ?             Seated thoracic extension w/1/2 foam roll  2x10           LTR  x10 ea 10x5'' ea 10x5'' ea         Lat stretch in kneeling with elbows on table  10" x10                                                  Ther Activity                                       Gait Training                                       Modalities

## 2023-09-21 ENCOUNTER — OFFICE VISIT (OUTPATIENT)
Dept: PHYSICAL THERAPY | Facility: REHABILITATION | Age: 33
End: 2023-09-21
Payer: COMMERCIAL

## 2023-09-21 DIAGNOSIS — M54.6 CHRONIC THORACIC BACK PAIN, UNSPECIFIED BACK PAIN LATERALITY: Primary | ICD-10-CM

## 2023-09-21 DIAGNOSIS — G89.29 CHRONIC THORACIC BACK PAIN, UNSPECIFIED BACK PAIN LATERALITY: Primary | ICD-10-CM

## 2023-09-21 DIAGNOSIS — M54.2 NECK PAIN: ICD-10-CM

## 2023-09-21 PROCEDURE — 97112 NEUROMUSCULAR REEDUCATION: CPT

## 2023-09-21 PROCEDURE — 97110 THERAPEUTIC EXERCISES: CPT

## 2023-09-21 PROCEDURE — 97140 MANUAL THERAPY 1/> REGIONS: CPT

## 2023-09-21 NOTE — PROGRESS NOTES
Daily Note     Today's date: 2023  Patient name: Arcadio Handy  : 1990  MRN: [id number]  Referring provider: Augustine Spivey MD  Dx:   Encounter Diagnosis     ICD-10-CM    1. Chronic thoracic back pain, unspecified back pain laterality  M54.6     G89.29       2. Neck pain  M54.2           Start Time: 1130  Stop Time: 1225  Total time in clinic (min): 55 minutes    Subjective: Patient reporting neck is feeling better today compared to previous session, more soreness than anything rating it as a 3/10. Objective: See treatment diary below      Assessment: Tolerated treatment well. Patient demonstrated fatigue post treatment, exhibited good technique with therapeutic exercises and would benefit from continued PT Good tolerance to all TE performed, no pain noted only muscle fatigue especially with TB TE, given updated HEP today reporting understanding. Plan: Continue per plan of care. Progress treatment as tolerated. Precautions: asthma, clotting disorder, DMII   Cannot tolerate supine for >5min at a time     * indicates included in HEP:   Access Code: [email or web address]  URL: [email or web address]    Manuals         STM B UT, LS, paraspinals In sitting VR KSG  B UT and upper back Done  Done  Done                                                Neuro Re-Ed             Chin tucks  Seated 5" x10 Seated 2x10x5'' Seated 2x10x5''         TB mid rows  GTB 2x10 GTB 2x10 GTB 3x10 GTB 3x10        TB low rows  GTB 2x10 GTB 2x10 GTB 3x10 GTB 3x10        Core stabilization   TA 2x10 TA 2x10 TA 2x10        Bridges  2x10 2x10 3x10 3x10         TA+march    2x10 ea 2x10 ea        Scapular retraction ER with TB    Pink TB 2x10 Pink TB 3x10        TB horizontal abduction     Pink TB 2x10        Ther Ex             Pt education POC, HEP, began chronic pain education, potential benefits of pain management consult            UBE  2'/2' 5' retro 10W 5' retro 10W 5' retro 15W Open book stretch * 10x10" b/l 10x10" 10x10'' ea 10x10'' ea 10x10'' ea        Supine HS stretch * 3x30" b/l 3x30" b/l 3x30'' b/l 3x30'' b/l 3x30'' b/l        LS stretch (no OP) * 3x30" b/l 10x10" 10x10'' 10x10'' 10x10''        UT stretch  10x10" 10x10'' 10x10'' 10x10''        Thoracic foam roll progression             SAMUEL educated in            Deep cervical flexion endurance testing NV ?             Seated thoracic extension w/1/2 foam roll  2x10           LTR  x10 ea 10x5'' ea 10x5'' ea 10x5'' ea        Lat stretch in kneeling with elbows on table  10" x10                                                  Ther Activity                                       Gait Training                                       Modalities

## 2023-09-24 DIAGNOSIS — G89.29 OTHER CHRONIC PAIN: Primary | ICD-10-CM

## 2023-09-26 ENCOUNTER — OFFICE VISIT (OUTPATIENT)
Dept: PHYSICAL THERAPY | Facility: REHABILITATION | Age: 33
End: 2023-09-26
Payer: COMMERCIAL

## 2023-09-26 DIAGNOSIS — M54.2 NECK PAIN: ICD-10-CM

## 2023-09-26 DIAGNOSIS — G89.29 CHRONIC THORACIC BACK PAIN, UNSPECIFIED BACK PAIN LATERALITY: Primary | ICD-10-CM

## 2023-09-26 DIAGNOSIS — M54.6 CHRONIC THORACIC BACK PAIN, UNSPECIFIED BACK PAIN LATERALITY: Primary | ICD-10-CM

## 2023-09-26 PROCEDURE — 97140 MANUAL THERAPY 1/> REGIONS: CPT

## 2023-09-26 PROCEDURE — 97112 NEUROMUSCULAR REEDUCATION: CPT

## 2023-09-26 PROCEDURE — 97110 THERAPEUTIC EXERCISES: CPT

## 2023-09-26 NOTE — PROGRESS NOTES
Daily Note     Today's date: 2023  Patient name: Renita Mcintosh  : 1990  MRN: [id number]  Referring provider: Lauren Garcia MD  Dx:   Encounter Diagnosis     ICD-10-CM    1. Chronic thoracic back pain, unspecified back pain laterality  M54.6     G89.29       2. Neck pain  M54.2           Start Time: 1050  Stop Time: 1140  Total time in clinic (min): 50 minutes    Subjective: Patient reports feeling "good" at start of session today stating "I feel like I am starting to loosen up a little bit."       Objective: See treatment diary below      Assessment: Tolerated treatment well. Patient demonstrated fatigue post treatment, exhibited good technique with therapeutic exercises and would benefit from continued PT Good tolerance to additional TA TE today, no pain noted only reports of muscle fatigue. Plan: Continue per plan of care. Progress treatment as tolerated. Precautions: asthma, clotting disorder, DMII   Cannot tolerate supine for >5min at a time      * indicates included in HEP:   Access Code: [email or web address]  URL: [email or web address]    Manuals        STM B UT, LS, paraspinals In sitting VR KSG  B UT and upper back Done  Done  Done  Done                                              Neuro Re-Ed             Chin tucks  Seated 5" x10 Seated 2x10x5'' Seated 2x10x5''         TB mid rows  GTB 2x10 GTB 2x10 GTB 3x10 GTB 3x10 GTB 3x12       TB low rows  GTB 2x10 GTB 2x10 GTB 3x10 GTB 3x10 GTB 3x12       Core stabilization   TA 2x10 TA 2x10 TA 2x10 np       Bridges  2x10 2x10 3x10 3x10  3x12       TA+march    2x10 ea 2x10 ea 2x10 ea       TA+ leg extension      2x10 ea       Scapular retraction ER with TB    Pink TB 2x10 Pink TB 3x10 Pink TB 3x12       TB horizontal abduction     Pink TB 2x10 Pink TB 3x10       Ther Ex             Pt education POC, HEP, began chronic pain education, potential benefits of pain management consult            UBE  2'/2' 5' retro 10W 5' retro 10W 5' retro 15W 5' retro 15W       Open book stretch * 10x10" b/l 10x10" 10x10'' ea 10x10'' ea 10x10'' ea 10x10'' ea       Supine HS stretch * 3x30" b/l 3x30" b/l 3x30'' b/l 3x30'' b/l 3x30'' b/l nv       LS stretch (no OP) * 3x30" b/l 10x10" 10x10'' 10x10'' 10x10'' nv       UT stretch  10x10" 10x10'' 10x10'' 10x10'' 10x10''       Thoracic foam roll progression             SAMUEL educated in            Deep cervical flexion endurance testing NV ?             Seated thoracic extension w/1/2 foam roll  2x10           LTR  x10 ea 10x5'' ea 10x5'' ea 10x5'' ea 10x5'' ea       Lat stretch in kneeling with elbows on table  10" x10                                                  Ther Activity                                       Gait Training                                       Modalities

## 2023-09-27 DIAGNOSIS — Z30.011 ENCOUNTER FOR INITIAL PRESCRIPTION OF CONTRACEPTIVE PILLS: ICD-10-CM

## 2023-09-27 RX ORDER — DROSPIRENONE AND ETHINYL ESTRADIOL 0.02-3(28)
1 KIT ORAL DAILY
Qty: 84 TABLET | Refills: 0 | Status: SHIPPED | OUTPATIENT
Start: 2023-09-27 | End: 2023-12-20

## 2023-09-28 ENCOUNTER — OFFICE VISIT (OUTPATIENT)
Dept: PHYSICAL THERAPY | Facility: REHABILITATION | Age: 33
End: 2023-09-28
Payer: COMMERCIAL

## 2023-09-28 DIAGNOSIS — G89.29 CHRONIC THORACIC BACK PAIN, UNSPECIFIED BACK PAIN LATERALITY: Primary | ICD-10-CM

## 2023-09-28 DIAGNOSIS — M54.6 CHRONIC THORACIC BACK PAIN, UNSPECIFIED BACK PAIN LATERALITY: Primary | ICD-10-CM

## 2023-09-28 DIAGNOSIS — M54.2 NECK PAIN: ICD-10-CM

## 2023-09-28 PROCEDURE — 97110 THERAPEUTIC EXERCISES: CPT

## 2023-09-28 PROCEDURE — 97140 MANUAL THERAPY 1/> REGIONS: CPT

## 2023-09-28 PROCEDURE — 97112 NEUROMUSCULAR REEDUCATION: CPT

## 2023-09-28 NOTE — PROGRESS NOTES
Daily Note     Today's date: 2023  Patient name: Anastacio Pak  : 1990  MRN: [id number]  Referring provider: Daniel Samaniego MD  Dx:   Encounter Diagnosis     ICD-10-CM    1. Chronic thoracic back pain, unspecified back pain laterality  M54.6     G89.29       2. Neck pain  M54.2           Start Time: 0945  Stop Time: 1025  Total time in clinic (min): 40 minutes    Subjective: Patient reporting neck as "alright, little sore" at start of session. She reports no complaints after previous session. Patient arrived to session 30 minutes late. Objective: See treatment diary below      Assessment: Tolerated treatment well. Patient demonstrated fatigue post treatment, exhibited good technique with therapeutic exercises and would benefit from continued PT      Plan: Continue per plan of care. Progress treatment as tolerated. Precautions: asthma, clotting disorder, DMII   Cannot tolerate supine for >5min at a time      * indicates included in HEP:   Access Code: [email or web address]  URL: [email or web address]    Manuals       STM B UT, LS, paraspinals In sitting VR KSG  B UT and upper back Done  Done  Done  Done Done                                              Neuro Re-Ed             Chin tucks  Seated 5" x10 Seated 2x10x5'' Seated 2x10x5''         TB mid rows  GTB 2x10 GTB 2x10 GTB 3x10 GTB 3x10 GTB 3x12 GTB 3x12      TB low rows  GTB 2x10 GTB 2x10 GTB 3x10 GTB 3x10 GTB 3x12 GTB 3x12      Core stabilization   TA 2x10 TA 2x10 TA 2x10 np       Bridges  2x10 2x10 3x10 3x10  3x12 3x12      TA+march    2x10 ea 2x10 ea 2x10 ea 2x10 ea      TA+ leg extension      2x10 ea 2x10 ea      Scapular retraction ER with TB    Pink TB 2x10 Pink TB 3x10 Pink TB 3x12 GTB 3x10      TB horizontal abduction     Pink TB 2x10 Pink TB 3x10 GTB 3x10      Ther Ex             Pt education POC, HEP, began chronic pain education, potential benefits of pain management consult UBE  2'/2' 5' retro 10W 5' retro 10W 5' retro 15W 5' retro 15W 5' retro 15W      Open book stretch * 10x10" b/l 10x10" 10x10'' ea 10x10'' ea 10x10'' ea 10x10'' ea nv      Supine HS stretch * 3x30" b/l 3x30" b/l 3x30'' b/l 3x30'' b/l 3x30'' b/l nv nv      LS stretch (no OP) * 3x30" b/l 10x10" 10x10'' 10x10'' 10x10'' nv nv       UT stretch  10x10" 10x10'' 10x10'' 10x10'' 10x10'' nv      Thoracic foam roll progression             SAMUEL educated in            Deep cervical flexion endurance testing NV ?             Seated thoracic extension w/1/2 foam roll  2x10           LTR  x10 ea 10x5'' ea 10x5'' ea 10x5'' ea 10x5'' ea 10x5'' ea      Lat stretch in kneeling with elbows on table  10" x10                                                  Ther Activity                                       Gait Training                                       Modalities

## 2023-10-04 ENCOUNTER — OFFICE VISIT (OUTPATIENT)
Dept: PHYSICAL THERAPY | Facility: REHABILITATION | Age: 33
End: 2023-10-04
Payer: COMMERCIAL

## 2023-10-04 DIAGNOSIS — M54.2 NECK PAIN: ICD-10-CM

## 2023-10-04 DIAGNOSIS — M54.6 CHRONIC THORACIC BACK PAIN, UNSPECIFIED BACK PAIN LATERALITY: Primary | ICD-10-CM

## 2023-10-04 DIAGNOSIS — G89.29 CHRONIC THORACIC BACK PAIN, UNSPECIFIED BACK PAIN LATERALITY: Primary | ICD-10-CM

## 2023-10-04 PROCEDURE — 97110 THERAPEUTIC EXERCISES: CPT

## 2023-10-04 PROCEDURE — 97140 MANUAL THERAPY 1/> REGIONS: CPT

## 2023-10-04 PROCEDURE — 97112 NEUROMUSCULAR REEDUCATION: CPT

## 2023-10-04 NOTE — PROGRESS NOTES
Daily Note     Today's date: 10/4/2023  Patient name: Lawyer Cooley  : 1990  MRN: [id number]  Referring provider: Laurence Vance MD  Dx:   Encounter Diagnosis     ICD-10-CM    1. Chronic thoracic back pain, unspecified back pain laterality  M54.6     G89.29       2. Neck pain  M54.2           Start Time: 1100  Stop Time: 1150  Total time in clinic (min): 50 minutes    Subjective: Patient reporting neck soreness at start of session stating "I woke up this morning and I felt it more." She reports no complaints after previous session. Objective: See treatment diary below      Assessment: Tolerated treatment well. Patient demonstrated fatigue post treatment, exhibited good technique with therapeutic exercises and would benefit from continued PT      Plan: Continue per plan of care. Progress treatment as tolerated. Precautions: asthma, clotting disorder, DMII   Cannot tolerate supine for >5min at a time      * indicates included in HEP:   Access Code: [email or web address]  URL: [email or web address]    Manuals 9/7 9/11 9/14 9/18 9/21 9/26 9/28 10/4     STM B UT, LS, paraspinals In sitting VR KSG  B UT and upper back Done  Done  Done  Done Done  Done                                             Neuro Re-Ed             Chin tucks  Seated 5" x10 Seated 2x10x5'' Seated 2x10x5''         TB mid rows  GTB 2x10 GTB 2x10 GTB 3x10 GTB 3x10 GTB 3x12 GTB 3x12 GTB 3x15     TB low rows  GTB 2x10 GTB 2x10 GTB 3x10 GTB 3x10 GTB 3x12 GTB 3x12 GTB 3x15     Core stabilization   TA 2x10 TA 2x10 TA 2x10 np       Bridges  2x10 2x10 3x10 3x10  3x12 3x12 3x15     TA+march    2x10 ea 2x10 ea 2x10 ea 2x10 ea 2x10 ea     TA+ leg extension      2x10 ea 2x10 ea 2x10 ea     Scapular retraction ER with TB    Pink TB 2x10 Pink TB 3x10 Pink TB 3x12 GTB 3x10 GTB 3x12     TB horizontal abduction     Pink TB 2x10 Pink TB 3x10 GTB 3x10 GTB 3x12     Ther Ex             Pt education POC, HEP, began chronic pain education, potential benefits of pain management consult            UBE  2'/2' 5' retro 10W 5' retro 10W 5' retro 15W 5' retro 15W 5' retro 15W 5' retro 15W     Open book stretch * 10x10" b/l 10x10" 10x10'' ea 10x10'' ea 10x10'' ea 10x10'' ea nv 10x10'' ea     Supine HS stretch * 3x30" b/l 3x30" b/l 3x30'' b/l 3x30'' b/l 3x30'' b/l nv nv      LS stretch (no OP) * 3x30" b/l 10x10" 10x10'' 10x10'' 10x10'' nv nv       UT stretch  10x10" 10x10'' 10x10'' 10x10'' 10x10'' nv 10x10''     Thoracic foam roll progression             SAMUEL educated in            Deep cervical flexion endurance testing NV ?             Seated thoracic extension w/1/2 foam roll  2x10           LTR  x10 ea 10x5'' ea 10x5'' ea 10x5'' ea 10x5'' ea 10x5'' ea 10x5'' ea     Lat stretch in kneeling with elbows on table  10" x10                                                  Ther Activity             Standing scaption        1# 3x10                  Gait Training                                       Modalities

## 2023-10-06 ENCOUNTER — OFFICE VISIT (OUTPATIENT)
Dept: PHYSICAL THERAPY | Facility: REHABILITATION | Age: 33
End: 2023-10-06
Payer: COMMERCIAL

## 2023-10-06 DIAGNOSIS — M54.2 NECK PAIN: ICD-10-CM

## 2023-10-06 DIAGNOSIS — M54.6 CHRONIC THORACIC BACK PAIN, UNSPECIFIED BACK PAIN LATERALITY: Primary | ICD-10-CM

## 2023-10-06 DIAGNOSIS — G89.29 CHRONIC THORACIC BACK PAIN, UNSPECIFIED BACK PAIN LATERALITY: Primary | ICD-10-CM

## 2023-10-06 PROCEDURE — 97140 MANUAL THERAPY 1/> REGIONS: CPT

## 2023-10-06 PROCEDURE — 97112 NEUROMUSCULAR REEDUCATION: CPT

## 2023-10-06 PROCEDURE — 97110 THERAPEUTIC EXERCISES: CPT

## 2023-10-06 NOTE — PROGRESS NOTES
Daily Note     Today's date: 10/6/2023  Patient name: Lawyer Cooley  : 1990  MRN: [id number]  Referring provider: Laurence Vance MD  Dx:   Encounter Diagnosis     ICD-10-CM    1. Chronic thoracic back pain, unspecified back pain laterality  M54.6     G89.29       2. Neck pain  M54.2           Start Time: 0945          Subjective:  Patient reporting neck is better today compared to previous session. She reports no complaints after previous session. Objective: See treatment diary below      Assessment: Tolerated treatment well. Patient demonstrated fatigue post treatment, exhibited good technique with therapeutic exercises and would benefit from continued PT Good tolerance to all TE performed today, no neck or back pain noted only muscle fatigue. Give updated HEP today with patient reporting understanding. Plan: Continue per plan of care. Progress treatment as tolerated. Precautions: asthma, clotting disorder, DMII   Cannot tolerate supine for >5min at a time      * indicates included in HEP:   Access Code: [email or web address]  URL: [email or web address]    Manuals 9/7 9/11 9/14 9/18 9/21 9/26 9/28 10/4 10/6    STM B UT, LS, paraspinals In sitting VR KSG  B UT and upper back Done  Done  Done  Done Done  Done  done                                            Neuro Re-Ed             Chin tucks  Seated 5" x10 Seated 2x10x5'' Seated 2x10x5''         TB mid rows  GTB 2x10 GTB 2x10 GTB 3x10 GTB 3x10 GTB 3x12 GTB 3x12 GTB 3x15 BTB 3x10    TB low rows  GTB 2x10 GTB 2x10 GTB 3x10 GTB 3x10 GTB 3x12 GTB 3x12 GTB 3x15 GTB 3x15     Core stabilization   TA 2x10 TA 2x10 TA 2x10 np       Bridges  2x10 2x10 3x10 3x10  3x12 3x12 3x15 10# 3x10    TA+march    2x10 ea 2x10 ea 2x10 ea 2x10 ea 2x10 ea np    TA+ leg extension      2x10 ea 2x10 ea 2x10 ea 3x10 ea    Scapular retraction ER with TB    Pink TB 2x10 Pink TB 3x10 Pink TB 3x12 GTB 3x10 GTB 3x12 GTB 3x12    TB horizontal abduction     Pink TB 2x10 Pink TB 3x10 GTB 3x10 GTB 3x12 GTB 3x12    Paloff press         GTB 10x5''    Ther Ex             Pt education POC, HEP, began chronic pain education, potential benefits of pain management consult            UBE  2'/2' 5' retro 10W 5' retro 10W 5' retro 15W 5' retro 15W 5' retro 15W 5' retro 15W 5' 20W retro    Open book stretch * 10x10" b/l 10x10" 10x10'' ea 10x10'' ea 10x10'' ea 10x10'' ea nv 10x10'' ea 10x10''    Supine HS stretch * 3x30" b/l 3x30" b/l 3x30'' b/l 3x30'' b/l 3x30'' b/l nv nv      LS stretch (no OP) * 3x30" b/l 10x10" 10x10'' 10x10'' 10x10'' nv nv       UT stretch  10x10" 10x10'' 10x10'' 10x10'' 10x10'' nv 10x10'' 10x10''    Thoracic foam roll progression             SAMUEL educated in            Deep cervical flexion endurance testing NV ?             Seated thoracic extension w/1/2 foam roll  2x10           LTR  x10 ea 10x5'' ea 10x5'' ea 10x5'' ea 10x5'' ea 10x5'' ea 10x5'' ea 10x5'' ea    Lat stretch in kneeling with elbows on table  10" x10                                                  Ther Activity             Standing scaption        1# 3x10 3# 3x10                 Gait Training                                       Modalities

## 2023-10-10 ENCOUNTER — TELEPHONE (OUTPATIENT)
Age: 33
End: 2023-10-10

## 2023-10-10 ENCOUNTER — OFFICE VISIT (OUTPATIENT)
Dept: PHYSICAL THERAPY | Facility: REHABILITATION | Age: 33
End: 2023-10-10
Payer: COMMERCIAL

## 2023-10-10 DIAGNOSIS — M54.6 CHRONIC THORACIC BACK PAIN, UNSPECIFIED BACK PAIN LATERALITY: Primary | ICD-10-CM

## 2023-10-10 DIAGNOSIS — M54.2 NECK PAIN: ICD-10-CM

## 2023-10-10 DIAGNOSIS — G89.29 CHRONIC THORACIC BACK PAIN, UNSPECIFIED BACK PAIN LATERALITY: Primary | ICD-10-CM

## 2023-10-10 PROCEDURE — 97110 THERAPEUTIC EXERCISES: CPT

## 2023-10-10 PROCEDURE — 97140 MANUAL THERAPY 1/> REGIONS: CPT

## 2023-10-10 PROCEDURE — 97112 NEUROMUSCULAR REEDUCATION: CPT

## 2023-10-10 NOTE — TELEPHONE ENCOUNTER
Rec'd call from patient. She states that she believes that she has met all her requirements and would like return call to schedule breast reduction consultation.

## 2023-10-10 NOTE — PROGRESS NOTES
Daily Note     Today's date: 10/10/2023  Patient name: Renita Mcintosh  : 1990  MRN: [id number]  Referring provider: Lauren Garcia MD  Dx:   Encounter Diagnosis     ICD-10-CM    1. Chronic thoracic back pain, unspecified back pain laterality  M54.6     G89.29       2. Neck pain  M54.2           Start Time: 1045  Stop Time: 1140  Total time in clinic (min): 55 minutes    Subjective: Patient reports both neck and back are feeling "good" at start of session today, she reports on complaints after previous session. Objective: See treatment diary below      Assessment: Tolerated treatment well. Patient demonstrated fatigue post treatment, exhibited good technique with therapeutic exercises and would benefit from continued PT Trialed prone TE today with good tolerance, cues required for UT compensation with patient able to self correct with cues, muscle fatigue noted. Plan: Continue per plan of care. Progress treatment as tolerated. Precautions: asthma, clotting disorder, DMII   Cannot tolerate supine for >5min at a time      * indicates included in HEP:   Access Code: [email or web address]  URL: [email or web address]    Manuals 9/11 9/14 9/18 9/21 9/26 9/28 10/4 10/6 10/10   STM B UT, LS, paraspinals KSG  B UT and upper back Done  Done  Done  Done Done  Done  done  Done                                        Neuro Re-Ed            Chin tucks Seated 5" x10 Seated 2x10x5'' Seated 2x10x5''         TB mid rows GTB 2x10 GTB 2x10 GTB 3x10 GTB 3x10 GTB 3x12 GTB 3x12 GTB 3x15 BTB 3x10 BTB 3x10   TB low rows GTB 2x10 GTB 2x10 GTB 3x10 GTB 3x10 GTB 3x12 GTB 3x12 GTB 3x15 GTB 3x15  BTB 3x10   Core stabilization  TA 2x10 TA 2x10 TA 2x10 np       Bridges 2x10 2x10 3x10 3x10  3x12 3x12 3x15 10# 3x10 10# 3x12   TA+march   2x10 ea 2x10 ea 2x10 ea 2x10 ea 2x10 ea np    TA+ leg extension     2x10 ea 2x10 ea 2x10 ea 3x10 ea 3x10 ea   Scapular retraction ER with TB   Pink TB 2x10 Pink TB 3x10 Pink TB 3x12 GTB 3x10 GTB 3x12 GTB 3x12 GTB 3x15   TB horizontal abduction    Pink TB 2x10 Pink TB 3x10 GTB 3x10 GTB 3x12 GTB 3x12 GTB 3x15   Paloff press        GTB 10x5'' GTB 2x10x5''   Prone I/T         3x10x3'' ea   Ther Ex            Pt education            UBE 2'/2' 5' retro 10W 5' retro 10W 5' retro 15W 5' retro 15W 5' retro 15W 5' retro 15W 5' 20W retro 5' 25W retro   Open book stretch * 10x10" 10x10'' ea 10x10'' ea 10x10'' ea 10x10'' ea nv 10x10'' ea 10x10'' 10x10'' ea   Supine HS stretch * 3x30" b/l 3x30'' b/l 3x30'' b/l 3x30'' b/l nv nv      LS stretch (no OP) * 10x10" 10x10'' 10x10'' 10x10'' nv nv       UT stretch 10x10" 10x10'' 10x10'' 10x10'' 10x10'' nv 10x10'' 10x10''    Thoracic foam roll progression            SAMUEL            Deep cervical flexion endurance testing            Seated thoracic extension w/1/2 foam roll 2x10           LTR x10 ea 10x5'' ea 10x5'' ea 10x5'' ea 10x5'' ea 10x5'' ea 10x5'' ea 10x5'' ea 10x5'' ea   Lat stretch in kneeling with elbows on table 10" x10                                               Ther Activity            Standing scaption       1# 3x10 3# 3x10 3# 3x15               Gait Training                                    Modalities

## 2023-10-11 ENCOUNTER — TELEPHONE (OUTPATIENT)
Dept: PLASTIC SURGERY | Facility: CLINIC | Age: 33
End: 2023-10-11

## 2023-10-11 NOTE — TELEPHONE ENCOUNTER
Lm for patient that we still need the full month of October and November physical therapy treatment notes in order to schedule breast reduction consult.

## 2023-10-12 ENCOUNTER — OFFICE VISIT (OUTPATIENT)
Dept: PHYSICAL THERAPY | Facility: REHABILITATION | Age: 33
End: 2023-10-12
Payer: COMMERCIAL

## 2023-10-12 DIAGNOSIS — M54.2 NECK PAIN: ICD-10-CM

## 2023-10-12 DIAGNOSIS — M54.6 CHRONIC THORACIC BACK PAIN, UNSPECIFIED BACK PAIN LATERALITY: Primary | ICD-10-CM

## 2023-10-12 DIAGNOSIS — G89.29 CHRONIC THORACIC BACK PAIN, UNSPECIFIED BACK PAIN LATERALITY: Primary | ICD-10-CM

## 2023-10-12 PROCEDURE — 97112 NEUROMUSCULAR REEDUCATION: CPT

## 2023-10-12 PROCEDURE — 97110 THERAPEUTIC EXERCISES: CPT

## 2023-10-12 PROCEDURE — 97140 MANUAL THERAPY 1/> REGIONS: CPT

## 2023-10-12 NOTE — PROGRESS NOTES
Daily Note     Today's date: 10/12/2023  Patient name: Lawyer Cooley  : 1990  MRN: [id number]  Referring provider: Laurence Vance MD  Dx:   Encounter Diagnosis     ICD-10-CM    1. Chronic thoracic back pain, unspecified back pain laterality  M54.6     G89.29       2. Neck pain  M54.2           Start Time: 1004  Stop Time: 1059  Total time in clinic (min): 55 minutes    Subjective: Patient reporting some neck soreness at start of session, reporting no complaints after previous session. Objective: See treatment diary below      Assessment: Tolerated treatment well. Patient demonstrated fatigue post treatment, exhibited good technique with therapeutic exercises, and would benefit from continued PT       Plan: Continue per plan of care. Progress treatment as tolerated. Precautions: asthma, clotting disorder, DMII   Cannot tolerate supine for >5min at a time      * indicates included in HEP:   Access Code: [email or web address]  URL: [email or web address]    Manuals 10/12  9/18 9/21 9/26 9/28 10/4 10/6 10/10   STM B UT, LS, paraspinals Done  Done  Done  Done Done  Done  done  Done                                        Neuro Re-Ed            Chin tucks   Seated 2x10x5''         TB mid rows BTB 3x12  GTB 3x10 GTB 3x10 GTB 3x12 GTB 3x12 GTB 3x15 BTB 3x10 BTB 3x10   TB low rows BTB 3x12  GTB 3x10 GTB 3x10 GTB 3x12 GTB 3x12 GTB 3x15 GTB 3x15  BTB 3x10   Core stabilization   TA 2x10 TA 2x10 np       Bridges 12# 3x12  3x10 3x10  3x12 3x12 3x15 10# 3x10 10# 3x12   Bridges with march 2x10           TA+march   2x10 ea 2x10 ea 2x10 ea 2x10 ea 2x10 ea np    TA+ leg extension     2x10 ea 2x10 ea 2x10 ea 3x10 ea 3x10 ea   Scapular retraction ER with TB GTB 3x15  Pink TB 2x10 Pink TB 3x10 Pink TB 3x12 GTB 3x10 GTB 3x12 GTB 3x12 GTB 3x15   TB horizontal abduction GTB 3x15   Pink TB 2x10 Pink TB 3x10 GTB 3x10 GTB 3x12 GTB 3x12 GTB 3x15   Paloff press GTB 2x10x5''       GTB 10x5'' GTB 2x10x5''   Prone I/T 3x10x3''ea        3x10x3'' ea   Ther Ex            Pt education            UBE 5' 25W retro  5' retro 10W 5' retro 15W 5' retro 15W 5' retro 15W 5' retro 15W 5' 20W retro 5' 25W retro   Open book stretch * 10x10'' ea  10x10'' ea 10x10'' ea 10x10'' ea nv 10x10'' ea 10x10'' 10x10'' ea   Supine HS stretch *   3x30'' b/l 3x30'' b/l nv nv      LS stretch (no OP) *   10x10'' 10x10'' nv nv       UT stretch   10x10'' 10x10'' 10x10'' nv 10x10'' 10x10''    Thoracic foam roll progression            SAMUEL            Deep cervical flexion endurance testing            Seated thoracic extension w/1/2 foam roll            LTR 10x5'' ea  10x5'' ea 10x5'' ea 10x5'' ea 10x5'' ea 10x5'' ea 10x5'' ea 10x5'' ea   Lat stretch in kneeling with elbows on table                                                Ther Activity            Standing scaption 3# 3x15 inc nv      1# 3x10 3# 3x10 3# 3x15               Gait Training                                    Modalities

## 2023-10-17 ENCOUNTER — APPOINTMENT (OUTPATIENT)
Dept: PHYSICAL THERAPY | Facility: REHABILITATION | Age: 33
End: 2023-10-17
Payer: COMMERCIAL

## 2023-10-18 ENCOUNTER — OFFICE VISIT (OUTPATIENT)
Dept: PHYSICAL THERAPY | Facility: REHABILITATION | Age: 33
End: 2023-10-18
Payer: COMMERCIAL

## 2023-10-18 DIAGNOSIS — G89.29 CHRONIC THORACIC BACK PAIN, UNSPECIFIED BACK PAIN LATERALITY: Primary | ICD-10-CM

## 2023-10-18 DIAGNOSIS — M54.2 NECK PAIN: ICD-10-CM

## 2023-10-18 DIAGNOSIS — M54.6 CHRONIC THORACIC BACK PAIN, UNSPECIFIED BACK PAIN LATERALITY: Primary | ICD-10-CM

## 2023-10-18 PROCEDURE — 97112 NEUROMUSCULAR REEDUCATION: CPT

## 2023-10-18 PROCEDURE — 97140 MANUAL THERAPY 1/> REGIONS: CPT

## 2023-10-18 PROCEDURE — 97110 THERAPEUTIC EXERCISES: CPT

## 2023-10-18 NOTE — PROGRESS NOTES
Daily Note     Today's date: 10/18/2023  Patient name: Axel Purdy  : 1990  MRN: [id number]  Referring provider: Oleg Anderson MD  Dx:   Encounter Diagnosis     ICD-10-CM    1. Chronic thoracic back pain, unspecified back pain laterality  M54.6     G89.29       2. Neck pain  M54.2           Start Time: 1145  Stop Time: 1242  Total time in clinic (min): 57 minutes    Subjective: Patient reports "feeling it a little bit in her neck" at start of session stating "it's probably because of stress."       Objective: See treatment diary below      Assessment: Tolerated treatment well. Patient demonstrated fatigue post treatment, exhibited good technique with therapeutic exercises, and would benefit from continued PT Good tolerance to additional TE, no pain noted with any TE performed. Given updated HEP today reporting understanding. Plan: Continue per plan of care. Progress treatment as tolerated. Precautions: asthma, clotting disorder, DMII   Cannot tolerate supine for >5min at a time      * indicates included in HEP:   Access Code: [email or web address]  URL: [email or web address]    Manuals 10/12 10/18  9/21 9/26 9/28 10/4 10/6 10/10   STM B UT, LS, paraspinals Done Done   Done  Done Done  Done  done  Done                                        Neuro Re-Ed            Chin tucks            TB mid rows BTB 3x12   GTB 3x10 GTB 3x12 GTB 3x12 GTB 3x15 BTB 3x10 BTB 3x10   TB low rows BTB 3x12 BTB 3x15  GTB 3x10 GTB 3x12 GTB 3x12 GTB 3x15 GTB 3x15  BTB 3x10   Core stabilization    TA 2x10 np       Bridges 12# 3x12   3x10  3x12 3x12 3x15 10# 3x10 10# 3x12   Bridges with march 2x10 3x10          TA+march    2x10 ea 2x10 ea 2x10 ea 2x10 ea np    TA+ leg extension     2x10 ea 2x10 ea 2x10 ea 3x10 ea 3x10 ea   Scapular retraction ER with TB GTB 3x15 GTB 3x15  Pink TB 3x10 Pink TB 3x12 GTB 3x10 GTB 3x12 GTB 3x12 GTB 3x15   TB horizontal abduction GTB 3x15 GTB 3x15  Pink TB 2x10 Pink TB 3x10 GTB 3x10 GTB 3x12 GTB 3x12 GTB 3x15   Paloff press GTB 2x10x5'' GTB 2x10x5''      GTB 10x5'' GTB 2x10x5''   Paloff press with rotation  Pink TB 2x10 ea          Prone I/T 3x10x3''ea 3x12x3'' ea       3x10x3'' ea   Ther Ex            Pt education            UBE 5' 25W retro 5' 25W retro  5' retro 15W 5' retro 15W 5' retro 15W 5' retro 15W 5' 20W retro 5' 25W retro   Open book stretch * 10x10'' ea 10x10'' ea  10x10'' ea 10x10'' ea nv 10x10'' ea 10x10'' 10x10'' ea   Supine HS stretch *    3x30'' b/l nv nv      LS stretch (no OP) *    10x10'' nv nv       UT stretch    10x10'' 10x10'' nv 10x10'' 10x10''    Thoracic foam roll progression            SAMUEL            Deep cervical flexion endurance testing            Seated thoracic extension w/1/2 foam roll            LTR 10x5'' ea 10x5'' ea  10x5'' ea 10x5'' ea 10x5'' ea 10x5'' ea 10x5'' ea 10x5'' ea   Lat stretch in kneeling with elbows on table                                                Ther Activity            Standing scaption 3# 3x15 inc nv 4# 3x12      1# 3x10 3# 3x10 3# 3x15               Gait Training                                    Modalities

## 2023-10-24 ENCOUNTER — OFFICE VISIT (OUTPATIENT)
Dept: PHYSICAL THERAPY | Facility: REHABILITATION | Age: 33
End: 2023-10-24
Payer: COMMERCIAL

## 2023-10-24 DIAGNOSIS — G89.29 CHRONIC THORACIC BACK PAIN, UNSPECIFIED BACK PAIN LATERALITY: Primary | ICD-10-CM

## 2023-10-24 DIAGNOSIS — M54.6 CHRONIC THORACIC BACK PAIN, UNSPECIFIED BACK PAIN LATERALITY: Primary | ICD-10-CM

## 2023-10-24 DIAGNOSIS — M54.2 NECK PAIN: ICD-10-CM

## 2023-10-24 PROCEDURE — 97112 NEUROMUSCULAR REEDUCATION: CPT | Performed by: PHYSICAL THERAPIST

## 2023-10-24 PROCEDURE — 97110 THERAPEUTIC EXERCISES: CPT | Performed by: PHYSICAL THERAPIST

## 2023-10-24 PROCEDURE — 97140 MANUAL THERAPY 1/> REGIONS: CPT | Performed by: PHYSICAL THERAPIST

## 2023-10-24 NOTE — PROGRESS NOTES
Daily Note     Today's date: 10/24/2023  Patient name: Oriana Disla  : 1990  MRN: [id number]  Referring provider: Wilmer Lucas MD  Dx:   Encounter Diagnosis     ICD-10-CM    1. Chronic thoracic back pain, unspecified back pain laterality  M54.6     G89.29       2. Neck pain  M54.2           Start Time: 1050  Stop Time: 1140  Total time in clinic (min): 50 minutes    Subjective: Patient reports the neck is at a constant 3/10. Objective: See treatment diary below      Assessment: Tolerated treatment well. Patient demonstrated fatigue post treatment, exhibited good technique with therapeutic exercises, and would benefit from continued PT      Plan: Continue per plan of care. Precautions: asthma, clotting disorder, DMII   Cannot tolerate supine for >5min at a time      * indicates included in HEP:   Access Code: [email or web address]  URL: [email or web address]    Manuals 10/12 10/18 10/24   9/28 10/4 10/6 10/10   STM B UT, LS, paraspinals Done Done  Done   Done  Done  done  Done                                        Neuro Re-Ed            Chin tucks            TB mid rows BTB 3x12     GTB 3x12 GTB 3x15 BTB 3x10 BTB 3x10   TB low rows BTB 3x12 BTB 3x15 BTB 3x15   GTB 3x12 GTB 3x15 GTB 3x15  BTB 3x10   Core stabilization            Bridges 12# 3x12     3x12 3x15 10# 3x10 10# 3x12   Bridges with march 2x10 3x10 3x10         TA+march      2x10 ea 2x10 ea np    TA+ leg extension      2x10 ea 2x10 ea 3x10 ea 3x10 ea   Scapular retraction ER with TB GTB 3x15 GTB 3x15 GTB 3x15   GTB 3x10 GTB 3x12 GTB 3x12 GTB 3x15   TB horizontal abduction GTB 3x15 GTB 3x15 GTB 3x15   GTB 3x10 GTB 3x12 GTB 3x12 GTB 3x15   Paloff press GTB 2x10x5'' GTB 2x10x5'' GTB 2x10x5"      GTB 10x5'' GTB 2x10x5''   Paloff press with rotation  Pink TB 2x10 ea          Prone I/T 3x10x3''ea 3x12x3'' ea 3x12x3" ea      3x10x3'' ea   Ther Ex            Pt education            UBE 5' 25W retro 5' 25W retro 5' 25W retro   5' retro 15W 5' retro 15W 5' 20W retro 5' 25W retro   Open book stretch * 10x10'' ea 10x10'' ea 10x10"ea   nv 10x10'' ea 10x10'' 10x10'' ea   Supine HS stretch *      nv      LS stretch (no OP) *      nv       UT stretch      nv 10x10'' 10x10''    Thoracic foam roll progression            SAMUEL            Deep cervical flexion endurance testing            Seated thoracic extension w/1/2 foam roll            LTR 10x5'' ea 10x5'' ea 10x5" ea   10x5'' ea 10x5'' ea 10x5'' ea 10x5'' ea   Lat stretch in kneeling with elbows on table                                                Ther Activity            Standing scaption 3# 3x15 inc nv 4# 3x12      1# 3x10 3# 3x10 3# 3x15               Gait Training                                    Modalities

## 2023-10-26 ENCOUNTER — OFFICE VISIT (OUTPATIENT)
Dept: PHYSICAL THERAPY | Facility: REHABILITATION | Age: 33
End: 2023-10-26
Payer: COMMERCIAL

## 2023-10-26 DIAGNOSIS — G89.29 CHRONIC THORACIC BACK PAIN, UNSPECIFIED BACK PAIN LATERALITY: Primary | ICD-10-CM

## 2023-10-26 DIAGNOSIS — M54.6 CHRONIC THORACIC BACK PAIN, UNSPECIFIED BACK PAIN LATERALITY: Primary | ICD-10-CM

## 2023-10-26 DIAGNOSIS — M54.2 NECK PAIN: ICD-10-CM

## 2023-10-26 PROCEDURE — 97140 MANUAL THERAPY 1/> REGIONS: CPT

## 2023-10-26 PROCEDURE — 97110 THERAPEUTIC EXERCISES: CPT

## 2023-10-26 PROCEDURE — 97112 NEUROMUSCULAR REEDUCATION: CPT

## 2023-10-26 NOTE — PROGRESS NOTES
Daily Note     Today's date: 10/26/2023  Patient name: Emmie Smith  : 1990  MRN: [id number]  Referring provider: Franchester Severe, MD  Dx:   Encounter Diagnosis     ICD-10-CM    1. Chronic thoracic back pain, unspecified back pain laterality  M54.6     G89.29       2. Neck pain  M54.2           Start Time: 0915  Stop Time: 1020  Total time in clinic (min): 65 minutes    Subjective: Patient reporting back soreness at start of session today. Objective: See treatment diary below      Assessment: Tolerated treatment well. Patient demonstrated fatigue post treatment, exhibited good technique with therapeutic exercises, and would benefit from continued PT      Plan: Continue per plan of care. Progress treatment as tolerated. Precautions: asthma, clotting disorder, DMII   Cannot tolerate supine for >5min at a time      * indicates included in HEP:   Access Code: [email or web address]  URL: [email or web address]    Manuals 10/12 10/18 10/24 10/26  9/28 10/4 10/6 10/10   STM B UT, LS, paraspinals Done Done  Done Done   Done  Done  done  Done                                        Neuro Re-Ed            Chin tucks            TB mid rows BTB 3x12     GTB 3x12 GTB 3x15 BTB 3x10 BTB 3x10   TB low rows BTB 3x12 BTB 3x15 BTB 3x15 Purple 3x10  GTB 3x12 GTB 3x15 GTB 3x15  BTB 3x10   Core stabilization            Bridges 12# 3x12     3x12 3x15 10# 3x10 10# 3x12   Bridges with march 2x10 3x10 3x10 3x12        TA+march      2x10 ea 2x10 ea np    TA+ leg extension      2x10 ea 2x10 ea 3x10 ea 3x10 ea   Scapular retraction ER with TB GTB 3x15 GTB 3x15 GTB 3x15 GTB 3x15 inc nv  GTB 3x10 GTB 3x12 GTB 3x12 GTB 3x15   TB horizontal abduction GTB 3x15 GTB 3x15 GTB 3x15 GTB 3x15 inc nv  GTB 3x10 GTB 3x12 GTB 3x12 GTB 3x15   Paloff press GTB 2x10x5'' GTB 2x10x5'' GTB 2x10x5"  BTB 2x10x5''    GTB 10x5'' GTB 2x10x5''   Paloff press with rotation  Pink TB 2x10 ea          Prone I/T 3x10x3''ea 3x12x3'' ea 3x12x3" ea [id number]'' ea     3x10x3'' ea   Ther Ex            Pt education            UBE 5' 25W retro 5' 25W retro 5' 25W retro 5' 25W retro  5' retro 15W 5' retro 15W 5' 20W retro 5' 25W retro   Open book stretch * 10x10'' ea 10x10'' ea 10x10"ea 10x10'' ea  nv 10x10'' ea 10x10'' 10x10'' ea   Supine HS stretch *      nv      LS stretch (no OP) *      nv       UT stretch      nv 10x10'' 10x10''    Thoracic foam roll progression            SAMUEL            Deep cervical flexion endurance testing            Seated thoracic extension w/1/2 foam roll            LTR 10x5'' ea 10x5'' ea  10x5'' ea  10x5'' ea 10x5'' ea 10x5'' ea 10x5'' ea   Lat stretch in kneeling with elbows on table                                                Ther Activity            Standing scaption 3# 3x15 inc nv 4# 3x12   4# 3x15   1# 3x10 3# 3x10 3# 3x15               Gait Training                                    Modalities

## 2023-11-01 ENCOUNTER — OFFICE VISIT (OUTPATIENT)
Dept: PHYSICAL THERAPY | Facility: REHABILITATION | Age: 33
End: 2023-11-01
Payer: COMMERCIAL

## 2023-11-01 DIAGNOSIS — G89.29 CHRONIC THORACIC BACK PAIN, UNSPECIFIED BACK PAIN LATERALITY: Primary | ICD-10-CM

## 2023-11-01 DIAGNOSIS — M54.6 CHRONIC THORACIC BACK PAIN, UNSPECIFIED BACK PAIN LATERALITY: Primary | ICD-10-CM

## 2023-11-01 DIAGNOSIS — M54.2 NECK PAIN: ICD-10-CM

## 2023-11-01 PROCEDURE — 97110 THERAPEUTIC EXERCISES: CPT

## 2023-11-01 PROCEDURE — 97112 NEUROMUSCULAR REEDUCATION: CPT

## 2023-11-01 PROCEDURE — 97140 MANUAL THERAPY 1/> REGIONS: CPT

## 2023-11-01 NOTE — PROGRESS NOTES
Daily Note     Today's date: 2023  Patient name: Lidia Gilliland  : 1990  MRN: [id number]  Referring provider: Priti Duffy MD  Dx:   Encounter Diagnosis     ICD-10-CM    1. Chronic thoracic back pain, unspecified back pain laterality  M54.6     G89.29       2. Neck pain  M54.2           Start Time: 1145  Stop Time: 1242  Total time in clinic (min): 57 minutes    Subjective: Patient reports neck as "a little sore today but I think it's the cold too." She reports no complaints after previous session. Objective: See treatment diary below      Assessment: Tolerated treatment well. Patient demonstrated fatigue post treatment, exhibited good technique with therapeutic exercises, and would benefit from continued PT Potential D/C next visit. Plan: Continue per plan of care. Progress treatment as tolerated. Precautions: asthma, clotting disorder, DMII   Cannot tolerate supine for >5min at a time      * indicates included in HEP:   Access Code: [email or web address]  URL: [email or web address]    Manuals 10/12 10/18 10/24 10/26 11/1  10/4 10/6 10/10   STM B UT, LS, paraspinals Done Done  Done Done  Done   Done  done  Done                                        Neuro Re-Ed            Chin tucks            TB mid rows BTB 3x12      GTB 3x15 BTB 3x10 BTB 3x10   TB low rows BTB 3x12 BTB 3x15 BTB 3x15 Purple 3x10 Purple 3x12  GTB 3x15 GTB 3x15  BTB 3x10   Core stabilization            Bridges 12# 3x12      3x15 10# 3x10 10# 3x12   Bridges with march 2x10 3x10 3x10 3x12 3x15       TA+march       2x10 ea np    TA+ leg extension       2x10 ea 3x10 ea 3x10 ea   Scapular retraction ER with TB GTB 3x15 GTB 3x15 GTB 3x15 GTB 3x15 inc nv BTB 3x10  GTB 3x12 GTB 3x12 GTB 3x15   TB horizontal abduction GTB 3x15 GTB 3x15 GTB 3x15 GTB 3x15 inc nv BTB 3x10  GTB 3x12 GTB 3x12 GTB 3x15   Paloff press GTB 2x10x5'' GTB 2x10x5'' GTB 2x10x5"  BTB 2x10x5'' BTB 2x10x5''   GTB 10x5'' GTB 2x10x5''   Paloff press with rotation  Pink TB 2x10 ea          Prone I/T 3x10x3''ea 3x12x3'' ea 3x12x3" ea 3x15x3'' ea 1# 3x10 ea    3x10x3'' ea   Ther Ex            Pt education            UBE 5' 25W retro 5' 25W retro 5' 25W retro 5' 25W retro 5' 25W retro  5' retro 15W 5' 20W retro 5' 25W retro   Open book stretch * 10x10'' ea 10x10'' ea 10x10"ea 10x10'' ea 10x10'' ea  10x10'' ea 10x10'' 10x10'' ea   Supine HS stretch *            LS stretch (no OP) *            UT stretch       10x10'' 10x10''    Thoracic foam roll progression            SAMUEL            Deep cervical flexion endurance testing            Seated thoracic extension w/1/2 foam roll            LTR 10x5'' ea 10x5'' ea  10x5'' ea 10x5'' ea  10x5'' ea 10x5'' ea 10x5'' ea   Lat stretch in kneeling with elbows on table                                                Ther Activity            Standing scaption 3# 3x15 inc nv 4# 3x12   4# 3x15 5# 3x12  1# 3x10 3# 3x10 3# 3x15               Gait Training                                    Modalities

## 2023-11-03 ENCOUNTER — HOSPITAL ENCOUNTER (OUTPATIENT)
Dept: RADIOLOGY | Facility: HOSPITAL | Age: 33
End: 2023-11-03
Payer: COMMERCIAL

## 2023-11-03 DIAGNOSIS — G89.29 OTHER CHRONIC PAIN: ICD-10-CM

## 2023-11-03 PROCEDURE — 72040 X-RAY EXAM NECK SPINE 2-3 VW: CPT

## 2023-11-03 PROCEDURE — 72080 X-RAY EXAM THORACOLMB 2/> VW: CPT

## 2023-11-06 ENCOUNTER — CONSULT (OUTPATIENT)
Dept: PAIN MEDICINE | Facility: CLINIC | Age: 33
End: 2023-11-06
Payer: COMMERCIAL

## 2023-11-06 VITALS
DIASTOLIC BLOOD PRESSURE: 65 MMHG | BODY MASS INDEX: 34.02 KG/M2 | SYSTOLIC BLOOD PRESSURE: 108 MMHG | HEART RATE: 80 BPM | WEIGHT: 186 LBS

## 2023-11-06 DIAGNOSIS — M54.2 NECK PAIN: ICD-10-CM

## 2023-11-06 DIAGNOSIS — M79.18 MYOFASCIAL PAIN SYNDROME: Primary | ICD-10-CM

## 2023-11-06 PROCEDURE — 99244 OFF/OP CNSLTJ NEW/EST MOD 40: CPT | Performed by: ANESTHESIOLOGY

## 2023-11-06 RX ORDER — METHOCARBAMOL 500 MG/1
500 TABLET, FILM COATED ORAL 2 TIMES DAILY PRN
Qty: 60 TABLET | Refills: 1 | Status: SHIPPED | OUTPATIENT
Start: 2023-11-06

## 2023-11-06 NOTE — PATIENT INSTRUCTIONS
Methocarbamol (By mouth)   Methocarbamol (meth-oh-CELINE-ba-mol)  Treats muscle pain and spasms. Brand Name(s): Robaxin   There may be other brand names for this medicine. When This Medicine Should Not Be Used: This medicine is not right for everyone. Do not use it if you had an allergic reaction to methocarbamol. How to Use This Medicine:   Tablet  Take your medicine as directed. Your dose may need to be changed several times to find what works best for you. Missed dose: Take a dose as soon as you remember. If it is almost time for your next dose, wait until then and take a regular dose. Do not take extra medicine to make up for a missed dose. Store the medicine in a closed container at room temperature, away from heat, moisture, and direct light. Drugs and Foods to Avoid:   Ask your doctor or pharmacist before using any other medicine, including over-the-counter medicines, vitamins, and herbal products. Some medicines can affect how methocarbamol works. Tell your doctor if you are using pyridostigmine. Do not drink alcohol while you are using this medicine. Tell your doctor if you use anything else that makes you sleepy. Some examples are allergy medicine, narcotic pain medicine, and alcohol. Warnings While Using This Medicine:   Tell your doctor if you are pregnant or breastfeeding, or if you have kidney disease, liver disease, or myasthenia gravis. This medicine may make you dizzy or drowsy. Do not drive or do anything that could be dangerous until you know how this medicine affects you. Tell any doctor or dentist who treats you that you are using this medicine. This medicine may affect certain medical test results. Your doctor will check your progress and the effects of this medicine at regular visits. Keep all appointments. Keep all medicine out of the reach of children. Never share your medicine with anyone.   Possible Side Effects While Using This Medicine:   Call your doctor right away if you notice any of these side effects: Allergic reaction: Itching or hives, swelling in your face or hands, swelling or tingling in your mouth or throat, chest tightness, trouble breathing  Blurred vision, redness, pain, or swelling of the eyes  Dark urine or pale stools, nausea, vomiting, loss of appetite, stomach pain, yellow skin or eyes  Fever  Lightheadedness, dizziness, fainting  Seizures  Slow heartbeat  Unusual bleeding, bruising, or weakness  If you notice these less serious side effects, talk with your doctor:   Confusion, trouble sleeping  Headache  Warmth or redness in your face, neck, arms, or upper chest  If you notice other side effects that you think are caused by this medicine, tell your doctor. Call your doctor for medical advice about side effects. You may report side effects to FDA at 2-345-FDA-2669  © Copyright [employer] 2023 Information is for End User's use only and may not be sold, redistributed or otherwise used for commercial purposes. The above information is an  only. It is not intended as medical advice for individual conditions or treatments. Talk to your doctor, nurse or pharmacist before following any medical regimen to see if it is safe and effective for you.

## 2023-11-06 NOTE — PROGRESS NOTES
Assessment  1. Myofascial pain syndrome    2. Neck pain        Plan  The patient symptoms, history/physical are consistent with a myofascial pain syndrome which is causing her neck pain and upper thoracic pain symptoms. I did review the x-rays of her cervical spine which shows straightening of her normal cervical lordosis which is consistent with muscle spasms. At this time, I will start her on low-dose methocarbamol 500 mg twice daily to help bring down the spasms. She was advised to initially started at bedtime before increasing to twice a day dosing after a few days due to it causing some sleepiness and dizziness. I did discuss that if she is not getting better with this I would likely increase the dose or switch her to tizanidine. In addition, we discussed postural realignment would be helpful. I also discussed that due to her large breasts pulling on her neck and shoulders, a breast reduction would be helpful in reducing the pain that she experiences. My impressions and treatment recommendations were discussed in detail with the patient who verbalized understanding and had no further questions. Discharge instructions were provided. I personally saw and examined the patient and I agree with the above discussed plan of care. No orders of the defined types were placed in this encounter. New Medications Ordered This Visit   Medications   • methocarbamol (ROBAXIN) 500 mg tablet     Sig: Take 1 tablet (500 mg total) by mouth 2 (two) times a day as needed for muscle spasms     Dispense:  60 tablet     Refill:  1       History of Present Illness    Glenn Gerardo is a 28 y.o. female referred by Dr. Herb Guerra for chronic upper back and neck pain that has been present for many years. Symptoms are moderate to severe rated 3-5/10 on a numeric rating scale is felt constantly. Symptoms are described to be throbbing, pressure-like, shooting, sharp, burning with numbness and paresthesias.   Pain symptoms are aggravated with physical activity, exercise and menstruation. Treatment history has included exercise which provides no relief. She has been going to physical therapy which has mildly been helpful. She has tried massage as well which has been minimally helpful. She uses Tylenol with some relief. I have personally reviewed and/or updated the patient's past medical history, past surgical history, family history, social history, current medications, allergies, and vital signs today. Review of Systems   Constitutional:  Positive for unexpected weight change. Negative for fever. HENT:  Negative for trouble swallowing. Eyes:  Negative for visual disturbance. Respiratory:  Negative for shortness of breath and wheezing. Cardiovascular:  Negative for chest pain and palpitations. Gastrointestinal:  Negative for constipation, diarrhea, nausea and vomiting. Endocrine: Negative for cold intolerance, heat intolerance and polydipsia. Genitourinary:  Negative for difficulty urinating and frequency. Musculoskeletal:  Positive for joint swelling and neck pain. Negative for arthralgias, gait problem and myalgias. Skin:  Negative for rash. Neurological:  Positive for headaches. Negative for dizziness, seizures, syncope and weakness. Hematological:  Does not bruise/bleed easily. Psychiatric/Behavioral:  Negative for dysphoric mood. The patient is nervous/anxious. All other systems reviewed and are negative.       Patient Active Problem List   Diagnosis   • Acute pelvic pain, female   • Asthma   • Uterine fibroid in antepartum period   • Encounter for gynecological examination   • Irregular menses   • Encounter for gynecological examination without abnormal finding   • Chronic bilateral low back pain   • Class 2 obesity with body mass index (BMI) of 36.0 to 36.9 in adult   • Large breasts       Past Medical History:   Diagnosis Date   • Allergic    • Anxiety    • Asthma    • Clotting disorder ([address])    • Depression    • Diabetes mellitus ([address])    • Eating disorder    • Enlarged thyroid    • Obesity    • Uterine fibroid    • Visual impairment        Past Surgical History:   Procedure Laterality Date   • TOOTH EXTRACTION  2005       Family History   Problem Relation Age of Onset   • No Known Problems Mother    • Diabetes Father    • Hypertension Father    • Liver cancer Maternal Grandmother    • Mental illness Maternal Grandmother    • Hypertension Paternal Grandmother    • Diabetes Paternal Grandfather    • Asthma Paternal Aunt    • Asthma Paternal Uncle        Social History     Occupational History   • Occupation: [employer]    Tobacco Use   • Smoking status: Former     Packs/day: 0.50     Years: 10.00     Total pack years: 5.00     Types: Cigarettes   • Smokeless tobacco: Never   Vaping Use   • Vaping Use: Never used   Substance and Sexual Activity   • Alcohol use: Yes     Alcohol/week: 2.0 - 3.0 standard drinks of alcohol     Types: 2 - 3 Standard drinks or equivalent per week     Comment: SOCIAL    • Drug use: Yes     Types: Marijuana   • Sexual activity: Yes     Partners: Male     Birth control/protection: OCP       Current Outpatient Medications on File Prior to Visit   Medication Sig   • albuterol (2.5 mg/3 mL) 0.083 % nebulizer solution inhale contents of 1 vial in nebulizer every 4 hours if needed for wheezing   • drospirenone-ethinyl estradiol (DAYTON) 3-0.02 MG per tablet Take 1 tablet by mouth daily   • EPINEPHrine (EPIPEN) 0.3 mg/0.3 mL SOAJ USE ASE DIRECTED BY MD   • nystatin (MYCOSTATIN) powder Apply topically 3 (three) times a day   • phentermine (Adipex-P) 37.5 MG tablet Take 1 tablet (37.5 mg total) by mouth in the morning   • VENTOLIN  (90 Base) MCG/ACT inhaler inhale 2 puff by inhalation route  every 8 hours as needed     No current facility-administered medications on file prior to visit.        Allergies   Allergen Reactions   • Grape (Artificial) Flavor - Food Allergy Anaphylaxis, Hives, Itching and Shortness Of Breath   • Pollen Extract        Physical Exam    /65   Pulse 80   Wt 84.4 kg (186 lb)   BMI 34.02 kg/m²     Constitutional: normal, well developed, well nourished, alert, in no distress and non-toxic and no overt pain behavior.   Eyes: anicteric  HEENT: grossly intact  Neck: supple, symmetric, trachea midline and no masses   Pulmonary:even and unlabored  Cardiovascular:No edema or pitting edema present  Skin:Normal without rashes or lesions and well hydrated  Psychiatric:Mood and affect appropriate  Neurologic:Cranial Nerves II-XII grossly intact  Musculoskeletal:normal    Cervical Spine Exam  Appearance:  Normal lordosis  Palpation/Tenderness:  left cervical paraspinal tenderness  right cervical paraspinal tenderness  left trapezium tenderness  right trapezium tenderness  trigger points palpable  Range of Motion:  Flexion:  Minimally limited  with pain  Extension:  Minimally limited  with pain  Rotation - Left:  Minimally limited  with pain  Rotation - Right:  Minimally limited  with pain  Motor Strength:  Left Arm Flexion  5/5  Left Arm Extension  5/5  Right Arm Flexion  5/5  Right Arm Extension  5/5  Left Wrist Flexion  5/5  Left Wrist Extension  5/5  Left Finger Abduction  5/5  Right Finger Abduction  5/5  Reflexes:  Left Biceps:  1+   Right Biceps:  1+   Left Triceps:  1+   Right Triceps:  1+     Imaging    X-ray cervical spine done on 11/3/2023 personally reviewed by me shows straightening of her normal cervical lordosis

## 2023-11-08 ENCOUNTER — OFFICE VISIT (OUTPATIENT)
Dept: PHYSICAL THERAPY | Facility: REHABILITATION | Age: 33
End: 2023-11-08
Payer: COMMERCIAL

## 2023-11-08 DIAGNOSIS — M54.6 CHRONIC THORACIC BACK PAIN, UNSPECIFIED BACK PAIN LATERALITY: Primary | ICD-10-CM

## 2023-11-08 DIAGNOSIS — G89.29 CHRONIC THORACIC BACK PAIN, UNSPECIFIED BACK PAIN LATERALITY: Primary | ICD-10-CM

## 2023-11-08 DIAGNOSIS — M54.2 NECK PAIN: ICD-10-CM

## 2023-11-08 PROCEDURE — 97110 THERAPEUTIC EXERCISES: CPT

## 2023-11-08 PROCEDURE — 97112 NEUROMUSCULAR REEDUCATION: CPT

## 2023-11-08 NOTE — PROGRESS NOTES
Daily Note     Today's date: 2023  Patient name: [place] Brunner  : 1990  MRN: [id number]  Referring provider: Mable Arellano MD  Dx:   Encounter Diagnosis     ICD-10-CM    1. Chronic thoracic back pain, unspecified back pain laterality  M54.6     G89.29       2. Neck pain  M54.2           Start Time: 0930  Stop Time: 1025  Total time in clinic (min): 55 minutes    Subjective: Patient reporting some soreness at start of session today, no complaints after previous session. Objective: See treatment diary below      Assessment: Tolerated treatment well. Patient demonstrated fatigue post treatment, exhibited good technique with therapeutic exercises, and would benefit from continued PT      Plan: Continue per plan of care. Progress treatment as tolerated. Precautions: asthma, clotting disorder, DMII   Cannot tolerate supine for >5min at a time      * indicates included in HEP:   Access Code: [email or web address]  URL: [email or web address]    Manuals 10/12 10/18 10/24 10/26 11/1 11/8  10/6 10/10   STM B UT, LS, paraspinals Done Done  Done Done  Done    done  Done                                        Neuro Re-Ed            Chin tucks            TB mid rows BTB 3x12       BTB 3x10 BTB 3x10   TB low rows BTB 3x12 BTB 3x15 BTB 3x15 Purple 3x10 Purple 3x12 Purple 3x12  GTB 3x15  BTB 3x10   Core stabilization            Bridges 12# 3x12       10# 3x10 10# 3x12   Bridges with march 2x10 3x10 3x10 3x12 3x15 3x15      TA+march        np    TA+ leg extension        3x10 ea 3x10 ea   Scapular retraction ER with TB GTB 3x15 GTB 3x15 GTB 3x15 GTB 3x15 inc nv BTB 3x10 BTB 3x12  GTB 3x12 GTB 3x15   TB horizontal abduction GTB 3x15 GTB 3x15 GTB 3x15 GTB 3x15 inc nv BTB 3x10 BTB 3x12  GTB 3x12 GTB 3x15   Paloff press GTB 2x10x5'' GTB 2x10x5'' GTB 2x10x5"  BTB 2x10x5'' BTB 2x10x5'' BTB 3x10x5''  GTB 10x5'' GTB 2x10x5''   Paloff press with rotation  Pink TB 2x10 ea          Prone I/T 3x10x3''ea 3x12x3'' ea 3x12x3" ea 3x15x3'' ea 1# 3x10 ea 1# 3I37 ea   3x10x3'' ea   Ther Ex            Pt education            UBE 5' 25W retro 5' 25W retro 5' 25W retro 5' 25W retro 5' 25W retro 5' 25W retro  5' 20W retro 5' 25W retro   Open book stretch * 10x10'' ea 10x10'' ea 10x10"ea 10x10'' ea 10x10'' ea 10x10'' ea  10x10'' 10x10'' ea   Supine HS stretch *            LS stretch (no OP) *            UT stretch        10x10''    Thoracic foam roll progression            SAMUEL            Deep cervical flexion endurance testing            Seated thoracic extension w/1/2 foam roll            LTR 10x5'' ea 10x5'' ea  10x5'' ea 10x5'' ea 10x5'' ea  10x5'' ea 10x5'' ea   Lat stretch in kneeling with elbows on table                                                Ther Activity            Standing scaption 3# 3x15 inc nv 4# 3x12   4# 3x15 5# 3x12 5# 3x15  3# 3x10 3# 3x15               Gait Training                                    Modalities

## 2023-11-09 ENCOUNTER — TELEPHONE (OUTPATIENT)
Dept: PLASTIC SURGERY | Facility: CLINIC | Age: 33
End: 2023-11-09

## 2023-11-20 ENCOUNTER — TELEPHONE (OUTPATIENT)
Dept: PLASTIC SURGERY | Facility: CLINIC | Age: 33
End: 2023-11-20

## 2023-11-20 DIAGNOSIS — Z30.011 ENCOUNTER FOR INITIAL PRESCRIPTION OF CONTRACEPTIVE PILLS: ICD-10-CM

## 2023-11-20 RX ORDER — DROSPIRENONE AND ETHINYL ESTRADIOL 0.02-3(28)
1 KIT ORAL DAILY
Qty: 84 TABLET | Refills: 0 | Status: SHIPPED | OUTPATIENT
Start: 2023-11-20 | End: 2024-02-12

## 2023-12-05 ENCOUNTER — TELEPHONE (OUTPATIENT)
Age: 33
End: 2023-12-05

## 2023-12-05 NOTE — TELEPHONE ENCOUNTER
Patient called to say she has chosen the doctor she would like for her breast surgery and is ready to move forward. She would like to have Dr Joseph Granado do her surgery. I advised I would send Wilmon Hammans a msg and she can get her started.

## 2023-12-19 ENCOUNTER — TELEPHONE (OUTPATIENT)
Age: 33
End: 2023-12-19

## 2023-12-19 NOTE — TELEPHONE ENCOUNTER
Patient called stating that she has 3 infected teeth, she was supposed to have a root canal done. Her insurance will not cover the root canal. She is asking if PCP is willing to right a letter of medical necessity stating she needs to have the root canal done. She is asking if PCP agrees, and once this is complete, that this be faxed to her dentist office Jeff Sotelo at [phone].

## 2024-01-11 ENCOUNTER — OFFICE VISIT (OUTPATIENT)
Dept: BARIATRICS | Facility: CLINIC | Age: 34
End: 2024-01-11
Payer: COMMERCIAL

## 2024-01-11 VITALS
HEART RATE: 98 BPM | RESPIRATION RATE: 16 BRPM | DIASTOLIC BLOOD PRESSURE: 75 MMHG | WEIGHT: 179.8 LBS | BODY MASS INDEX: 33.09 KG/M2 | HEIGHT: 62 IN | SYSTOLIC BLOOD PRESSURE: 118 MMHG

## 2024-01-11 DIAGNOSIS — M54.50 CHRONIC BILATERAL LOW BACK PAIN: ICD-10-CM

## 2024-01-11 DIAGNOSIS — E66.9 OBESITY (BMI 30.0-34.9): Primary | ICD-10-CM

## 2024-01-11 DIAGNOSIS — E16.1 HYPERINSULINEMIA: ICD-10-CM

## 2024-01-11 DIAGNOSIS — G89.29 CHRONIC BILATERAL LOW BACK PAIN: ICD-10-CM

## 2024-01-11 DIAGNOSIS — J45.20 MILD INTERMITTENT ASTHMA WITHOUT COMPLICATION: ICD-10-CM

## 2024-01-11 DIAGNOSIS — E61.1 IRON DEFICIENCY: ICD-10-CM

## 2024-01-11 DIAGNOSIS — M79.18 MYOFASCIAL PAIN SYNDROME: ICD-10-CM

## 2024-01-11 DIAGNOSIS — R74.01 TRANSAMINITIS: ICD-10-CM

## 2024-01-11 PROBLEM — E66.812 CLASS 2 OBESITY WITH BODY MASS INDEX (BMI) OF 36.0 TO 36.9 IN ADULT: Status: RESOLVED | Noted: 2022-03-14 | Resolved: 2024-01-11

## 2024-01-11 PROBLEM — Z01.419 ENCOUNTER FOR GYNECOLOGICAL EXAMINATION WITHOUT ABNORMAL FINDING: Status: RESOLVED | Noted: 2019-08-01 | Resolved: 2024-01-11

## 2024-01-11 PROBLEM — Z01.419 ENCOUNTER FOR GYNECOLOGICAL EXAMINATION: Status: RESOLVED | Noted: 2018-03-15 | Resolved: 2024-01-11

## 2024-01-11 PROBLEM — E66.811 OBESITY (BMI 30.0-34.9): Status: ACTIVE | Noted: 2022-03-14

## 2024-01-11 PROBLEM — N62 LARGE BREASTS: Status: RESOLVED | Noted: 2023-07-13 | Resolved: 2024-01-11

## 2024-01-11 PROCEDURE — 99203 OFFICE O/P NEW LOW 30 MIN: CPT | Performed by: INTERNAL MEDICINE

## 2024-01-11 RX ORDER — CHLORHEXIDINE GLUCONATE ORAL RINSE 1.2 MG/ML
SOLUTION DENTAL
COMMUNITY
Start: 2023-12-14

## 2024-01-11 RX ORDER — NALTREXONE HYDROCHLORIDE 50 MG/1
25 TABLET, FILM COATED ORAL DAILY
Qty: 45 TABLET | Refills: 0 | Status: SHIPPED | OUTPATIENT
Start: 2024-01-11 | End: 2024-01-19 | Stop reason: SDUPTHER

## 2024-01-11 RX ORDER — BUPROPION HYDROCHLORIDE 150 MG/1
150 TABLET ORAL DAILY
Qty: 90 TABLET | Refills: 0 | Status: SHIPPED | OUTPATIENT
Start: 2024-01-11

## 2024-01-11 NOTE — PATIENT INSTRUCTIONS
- Discussed options of HealthyCORE-Intensive Lifestyle Intervention Program, Very Low Calorie Diet-VLCD, and Conservative Program and the role of weight loss medications.  - Explained the importance of making lifestyle changes which she has already made.  Struggles with cravings for savory, salty, and fried foods.  - Reports side effects when on phentermine and topiramate in the past although she was started on the highest dose of phentermine.  Regardless, phentermine is not a good option given her history of palpitations and anxiety.  - Patient is interested in pursuing Conservative Program  - Initial weight loss goal of 5-10% weight loss for improved health as studies have shown this is where we see the greatest impact on improving health and decreasing risk of obesity related conditions.  - Weight loss can improve patient's co-morbid conditions and/or prevent weight-related complications.  - Stop Bang 1/8  - Labs reviewed: As below.        General Recommendations:  Nutrition:  Eat breakfast daily.  Do not skip meals.     Food log (ie.) www.[email or web address].[email or web address], sparkpeople.com, loseit.com, calorieking.com, etc.    Practice mindful eating.  Be sure to set aside time to eat, eat slowly, and savor your food.    Hydration:    At least 64oz of water daily.  No sugar sweetened beverages.  No juice (eat the fruit instead).    Exercise:  Studies have shown that the ideal exercise goal is somewhere between 150 to 300 minutes of moderate intensity exercise a week.  Start with exercising 10 minutes every other day and gradually increase physical activity with a goal of at least 150 minutes of moderate intensity exercise a week, divided over at least 3 days a week.  An example of this would be exercising 30 minutes a day, 5 days a week.  Resistance training can increase muscle mass and increase our resting metabolic rate.   FULL BODY resistance training is recommended 2-3 times a week.  Do not do this on consecutive days to  "allow for muscle recovery.    Aim for a bare minimum 5000 steps, even on days you do not exercise.    Monitoring:   Weigh yourself daily.  If this causes undue stress, then just weigh yourself once a week.  Weigh yourself the same time of the day with the same amount of clothing on.  Preferably this should be done after waking up, before you eat, and with no clothing or minimal clothing on.    Specific Goals:  Food log (ie.) www.[employer].com,sparkpeople.com,[employer].com,[employer].com,etc.   No sugary beverages. At least 64oz of water daily.  0255-8318 calories per day (Provided with meal plan to follow).    Find out what type of thyroid cancer your aunt had (follicular, papillary, medullary, etc)    Take bupropion 150mg daily and naltrexone 25mg (1/2 tablet) daily.  You are beng started on \"contrave\" or on the generic versions of its components at approximated dosages.  If you develop abdominal upset, headache, dizziness, trouble sleeping, increased blood pressure, depression, anxiety, and fatigue, then you must contact the office right away.  If you develop thoughts of harming yourself or others then stop the medication right away and go to the Emergency Room.     Nurse visit in a month.    Return visit:  3 months   "

## 2024-01-11 NOTE — PROGRESS NOTES
Assessment/Plan:  Stephanie was seen today for consult.    Diagnoses and all orders for this visit:    Obesity (BMI 30.0-34.9)  -     Hemoglobin A1C; Future  -     Comprehensive metabolic panel; Future  -     Gamma GT; Future  -     Cancel: US right upper quadrant; Future  -     buPROPion (Wellbutrin XL) 150 mg 24 hr tablet; Take 1 tablet (150 mg total) by mouth daily  -     naltrexone (REVIA) 50 mg tablet; Take 0.5 tablets (25 mg total) by mouth daily    Hyperinsulinemia  -     Hemoglobin A1C; Future  -     Comprehensive metabolic panel; Future    Transaminitis  -     Gamma GT; Future  -     Cancel: US right upper quadrant; Future    Iron deficiency  -     CBC (Includes Diff/Plt) (Refl); Future  -     Iron, TIBC and Ferritin Panel; Future    Mild intermittent asthma without complication    Chronic bilateral low back pain    - Discussed options of HealthyCORE-Intensive Lifestyle Intervention Program, Very Low Calorie Diet-VLCD, and Conservative Program and the role of weight loss medications.  - Explained the importance of making lifestyle changes which she has already made.  Struggles with cravings for savory, salty, and fried foods.  - Reports side effects when on phentermine and topiramate in the past although she was started on the highest dose of phentermine.  Regardless, phentermine is not a good option given her history of palpitations and anxiety.  - Patient is interested in pursuing Conservative Program  - Initial weight loss goal of 5-10% weight loss for improved health as studies have shown this is where we see the greatest impact on improving health and decreasing risk of obesity related conditions.  - Weight loss can improve patient's co-morbid conditions and/or prevent weight-related complications.  - Stop Bang 1/8  - Labs reviewed: As below.        General Recommendations:  Nutrition:  Eat breakfast daily.  Do not skip meals.     Food log (ie.) www.hetras.com, sparkpeople.com, Sofea.com,  "[employer], etc.    Practice mindful eating.  Be sure to set aside time to eat, eat slowly, and savor your food.    Hydration:    At least 64oz of water daily.  No sugar sweetened beverages.  No juice (eat the fruit instead).    Exercise:  Studies have shown that the ideal exercise goal is somewhere between 150 to 300 minutes of moderate intensity exercise a week.  Start with exercising 10 minutes every other day and gradually increase physical activity with a goal of at least 150 minutes of moderate intensity exercise a week, divided over at least 3 days a week.  An example of this would be exercising 30 minutes a day, 5 days a week.  Resistance training can increase muscle mass and increase our resting metabolic rate.   FULL BODY resistance training is recommended 2-3 times a week.  Do not do this on consecutive days to allow for muscle recovery.    Aim for a bare minimum 5000 steps, even on days you do not exercise.    Monitoring:   Weigh yourself daily.  If this causes undue stress, then just weigh yourself once a week.  Weigh yourself the same time of the day with the same amount of clothing on.  Preferably this should be done after waking up, before you eat, and with no clothing or minimal clothing on.    Specific Goals:  Food log (ie.) www.[employer].com,sparkpeople.com,loseit.com,calorieking.com,etc.   No sugary beverages. At least 64oz of water daily.  1825-0757 calories per day (Provided with meal plan to follow).    Find out what type of thyroid cancer your aunt had (follicular, papillary, medullary, etc)    Take bupropion 150mg daily and naltrexone 25mg (1/2 tablet) daily.  You are beng started on \"contrave\" or on the generic versions of its components at approximated dosages.  If you develop abdominal upset, headache, dizziness, trouble sleeping, increased blood pressure, depression, anxiety, and fatigue, then you must contact the office right away.  If you develop thoughts of harming yourself or " others then stop the medication right away and go to the Emergency Room.     Nurse visit in a month.    Return visit:  3 months     Total time spent reviewing chart, interviewing patient, examining patient, discussing plan, answering all questions, and documentin min.       ______________________________________________________________________      Subjective:   Chief Complaint   Patient presents with    Consult     MWM Consult; Gw-150LB ; Waist- ; Stop Bang-     HPI: Stephanie Moses  is a 33 y.o. female with history of hyperinsulinemia, transaminitis, iron deficiency, and excess weight, here to pursue weight loss management.  Previous notes and records have been reviewed.    HPI  Wt Readings from Last 20 Encounters:   24 81.6 kg (179 lb 12.8 oz)   23 84.4 kg (186 lb)   23 84.5 kg (186 lb 6 oz)   23 83.6 kg (184 lb 6 oz)   23 87.1 kg (192 lb)   22 90.7 kg (200 lb)   22 91.6 kg (202 lb)   22 92.1 kg (203 lb)   20 90.7 kg (200 lb)   19 88.4 kg (194 lb 12.8 oz)   03/15/18 81.6 kg (180 lb)   16 87.1 kg (192 lb)   10/27/15 80.7 kg (178 lb)   04/09/15 71.5 kg (157 lb 9.6 oz)   02/17/15 69.9 kg (154 lb)   01/22/15 69.4 kg (153 lb)   10/24/14 68.6 kg (151 lb 3.2 oz)     Excess Weight:  Highest weight: 203  Current weight: 179  Goal: 150  What has been tried: Diet and Exercise  Low carb diet  Increase fruit and vegetable intake.  Phentermine - insomnia (37.5mg dose)  Topiramate (while also on phentermine - Dr. Scafidi)  Metformin - not sure why it was stopped.    Contributing factors: Poor Food Choices and Insufficient Physical Activity    Hunger/Cravings: Salty.  Rice.  Dining out:   Twice a week  Hydration:  Water 64+    Soda 20 oz a day (stopped and switched to selter - 3 months ago)  Alcohol:    3-4 hard seltzer drinks a month  Smoking:  No  Exercise:   No  Weight Monitoring:    No  Sleep:   6 hours a night  STOP-BANG Score:    Occupation:   "Home health aid    Past Medical History:   Diagnosis Date    Allergic     Anxiety     Asthma     Clotting disorder (HCC)     Depression     Diabetes mellitus (HCC)     Eating disorder     Enlarged thyroid     Obesity     Uterine fibroid     Visual impairment      Patient denies personal and family history of pancreatitis, MEN-2 tumors.   Denies any hx of glaucoma, seizures, kidney stones, gallstones.  Denies Hx of CAD, PAD, arrhythmia. + palpitations  Denies uncontrolled depression, suicidal behavior or thinking , insomnia or sleep disturbance. + intermittent/situational anxiety    Past Surgical History:   Procedure Laterality Date    TOOTH EXTRACTION  2005     The following portions of the patient's history were reviewed and updated as appropriate: allergies, current medications, past family history, past medical history, past social history, past surgical history, and problem list.    Review Of Systems:  Review of Systems   Constitutional:  Negative for activity change, appetite change, fatigue and fever.   Respiratory:  Negative for cough and shortness of breath.    Cardiovascular:  Positive for palpitations (occasional). Negative for chest pain and leg swelling.   Gastrointestinal:  Negative for abdominal pain, constipation, diarrhea, nausea and vomiting.   Endocrine: Negative for cold intolerance and heat intolerance.   Genitourinary:  Negative for difficulty urinating and dysuria.   Musculoskeletal:  Negative for arthralgias, back pain and gait problem.   Skin:  Negative for pallor and rash.   Neurological:  Negative for headaches.   Psychiatric/Behavioral:  Negative for dysphoric mood, sleep disturbance and suicidal ideas (or HI). The patient is nervous/anxious (intermittent).      Objective:  /75   Pulse 98   Resp 16   Ht 5' 2\" (1.575 m)   Wt 81.6 kg (179 lb 12.8 oz)   BMI 32.89 kg/m²   Physical Exam    Labs and Imaging  Recent labs and imaging have been personally reviewed.  Lab Results "   Component Value Date    WBC 8.57 04/08/2023    HGB 11.7 04/08/2023    HCT 37.1 04/08/2023    MCV 88 04/08/2023     04/08/2023     Lab Results   Component Value Date     10/23/2014    SODIUM 141 05/17/2022    K 3.3 (L) 05/17/2022     05/17/2022    CO2 25 05/17/2022    ANIONGAP 8 10/23/2014    AGAP 10 05/17/2022    BUN 7 05/17/2022    CREATININE 0.82 05/17/2022    GLUC 117 05/17/2022    GLUF 100 (H) 04/28/2022    CALCIUM 8.7 05/17/2022    AST 42 (H) 05/17/2022     (H) 05/17/2022    ALKPHOS 99 05/17/2022    TP 7.4 05/17/2022    TBILI 0.40 05/17/2022    EGFR 94 05/17/2022     Lab Results   Component Value Date    HGBA1C 5.1 02/11/2022     Lab Results   Component Value Date    LBX3JDBBQXUS 2.669 04/08/2023    TSH 1.82 09/17/2020     Lab Results   Component Value Date    CHOLESTEROL 161 04/28/2022     Lab Results   Component Value Date    HDL 43 (L) 04/28/2022     Lab Results   Component Value Date    TRIG 207 (H) 04/28/2022     Lab Results   Component Value Date    LDLCALC 77 04/28/2022

## 2024-01-12 RX ORDER — METHOCARBAMOL 500 MG/1
500 TABLET, FILM COATED ORAL 2 TIMES DAILY PRN
Qty: 60 TABLET | Refills: 5 | Status: SHIPPED | OUTPATIENT
Start: 2024-01-12

## 2024-01-12 NOTE — TELEPHONE ENCOUNTER
S/w pt, confirmed she is taking methocarbamol and does need a refill at this time. Pt states medication is helpful  Thank you

## 2024-01-19 DIAGNOSIS — E66.9 OBESITY (BMI 30.0-34.9): ICD-10-CM

## 2024-01-19 RX ORDER — NALTREXONE HYDROCHLORIDE 50 MG/1
25 TABLET, FILM COATED ORAL DAILY
Qty: 45 TABLET | Refills: 0 | Status: SHIPPED | OUTPATIENT
Start: 2024-01-19

## 2024-01-24 ENCOUNTER — TELEPHONE (OUTPATIENT)
Dept: PLASTIC SURGERY | Facility: CLINIC | Age: 34
End: 2024-01-24

## 2024-01-24 NOTE — TELEPHONE ENCOUNTER
Lm for patient to call front to r/s her 2/19 appt with Dr. Patrick due to schedule change.  Can offer patient 11 or 4 on 2/14 due to patient having been r/s several times.

## 2024-01-24 NOTE — TELEPHONE ENCOUNTER
Patient called and said she can take the 11 am. She also wanted to know if it will effect her insurance paying since she has been out of physical therapy for so long (she did do three months). I advised I would let Ifeoma know and ask her to call the patient in regards to her question.

## 2024-01-25 ENCOUNTER — TELEPHONE (OUTPATIENT)
Dept: PLASTIC SURGERY | Facility: CLINIC | Age: 34
End: 2024-01-25

## 2024-01-25 NOTE — TELEPHONE ENCOUNTER
Emailed patient that her being done physical therapy for a few months will not affect her upcoming visit.

## 2024-02-06 ENCOUNTER — HOSPITAL ENCOUNTER (EMERGENCY)
Facility: HOSPITAL | Age: 34
Discharge: HOME | End: 2024-02-06
Attending: EMERGENCY MEDICINE
Payer: COMMERCIAL

## 2024-02-06 ENCOUNTER — APPOINTMENT (EMERGENCY)
Dept: RADIOLOGY | Facility: HOSPITAL | Age: 34
End: 2024-02-06
Payer: COMMERCIAL

## 2024-02-06 VITALS
RESPIRATION RATE: 17 BRPM | SYSTOLIC BLOOD PRESSURE: 140 MMHG | TEMPERATURE: 97.7 F | HEART RATE: 84 BPM | DIASTOLIC BLOOD PRESSURE: 74 MMHG | OXYGEN SATURATION: 98 %

## 2024-02-06 DIAGNOSIS — S90.32XA CONTUSION OF LEFT FOOT, INITIAL ENCOUNTER: ICD-10-CM

## 2024-02-06 DIAGNOSIS — S93.402A SPRAIN OF LEFT ANKLE, UNSPECIFIED LIGAMENT, INITIAL ENCOUNTER: Primary | ICD-10-CM

## 2024-02-06 PROCEDURE — 99284 EMERGENCY DEPT VISIT MOD MDM: CPT | Performed by: PHYSICIAN ASSISTANT

## 2024-02-06 PROCEDURE — 73630 X-RAY EXAM OF FOOT: CPT

## 2024-02-06 PROCEDURE — 99283 EMERGENCY DEPT VISIT LOW MDM: CPT

## 2024-02-06 PROCEDURE — 73610 X-RAY EXAM OF ANKLE: CPT

## 2024-02-06 RX ORDER — ACETAMINOPHEN 325 MG/1
975 TABLET ORAL ONCE
Status: COMPLETED | OUTPATIENT
Start: 2024-02-06 | End: 2024-02-06

## 2024-02-06 RX ADMIN — ACETAMINOPHEN 975 MG: 325 TABLET, FILM COATED ORAL at 09:24

## 2024-02-06 NOTE — ED PROVIDER NOTES
"History  Chief Complaint   Patient presents with    Ankle Injury     Pt presents to ed for left ankle injury that happened a week ago, states she twisted her left ankle- has had multiple injuries in the ankle so wasn't concerned about it but states the swelling, pain and bruising is getting worse. No other complaints      Past Medical History:  Anxiety, Asthma, Clotting disorder, Depression, DM, Eating disorder, Enlarged thyroid, Obesity,  Uterine fibroid   Past Surgical History: TOOTH EXTRACTION      Patient presents to ED c/o 1 week h/o persistent left foot/ankle pain, swelling, bruising since she twisted it when she missed stepped off curb.  Pt with h/o \"weak ankles/sprain\".   Pt did not fall; presents today bc concerned it's not healing like it should  Patient requesting Tylenol        Prior to Admission Medications   Prescriptions Last Dose Informant Patient Reported? Taking?   EPINEPHrine (EPIPEN) 0.3 mg/0.3 mL SOAJ   Yes No   Sig: USE ASE DIRECTED BY MD NUNEZ  (90 Base) MCG/ACT inhaler   Yes No   Sig: inhale 2 puff by inhalation route  every 8 hours as needed   albuterol (2.5 mg/3 mL) 0.083 % nebulizer solution   Yes No   Sig: inhale contents of 1 vial in nebulizer every 4 hours if needed for wheezing   buPROPion (Wellbutrin XL) 150 mg 24 hr tablet   No No   Sig: Take 1 tablet (150 mg total) by mouth daily   chlorhexidine (PERIDEX) 0.12 % solution   Yes No   Sig: SWISH AND SPIT TAKE 15ML BY MOUTH FOR 30 SECONDS TWICE A DAY AS DIRECTED   drospirenone-ethinyl estradiol (DAYTON) 3-0.02 MG per tablet   No No   Sig: Take 1 tablet by mouth daily   methocarbamol (ROBAXIN) 500 mg tablet   No No   Sig: TAKE 1 TABLET (500 MG TOTAL) BY MOUTH 2 (TWO) TIMES A DAY AS NEEDED FOR MUSCLE SPASMS   naltrexone (REVIA) 50 mg tablet   No No   Sig: Take 0.5 tablets (25 mg total) by mouth daily   nystatin (MYCOSTATIN) powder   No No   Sig: Apply topically 3 (three) times a day      Facility-Administered Medications: None "       Past Medical History:   Diagnosis Date    Allergic     Anxiety     Asthma     Clotting disorder (HCC)     Depression     Diabetes mellitus (HCC)     Eating disorder     Enlarged thyroid     Obesity     Uterine fibroid     Visual impairment        Past Surgical History:   Procedure Laterality Date    TOOTH EXTRACTION  2005       Family History   Problem Relation Age of Onset    No Known Problems Mother     Diabetes Father     Hypertension Father     Liver cancer Maternal Grandmother     Mental illness Maternal Grandmother     Hypertension Paternal Grandmother     Diabetes Paternal Grandfather     Asthma Paternal Aunt     Asthma Paternal Uncle      I have reviewed and agree with the history as documented.    E-Cigarette/Vaping    E-Cigarette Use Never User      E-Cigarette/Vaping Substances    Nicotine No     THC No     CBD No     Flavoring No     Other No     Unknown No      Social History     Tobacco Use    Smoking status: Former     Current packs/day: 0.50     Average packs/day: 0.5 packs/day for 10.0 years (5.0 ttl pk-yrs)     Types: Cigarettes    Smokeless tobacco: Never   Vaping Use    Vaping status: Never Used   Substance Use Topics    Alcohol use: Yes     Alcohol/week: 2.0 - 3.0 standard drinks of alcohol     Types: 2 - 3 Standard drinks or equivalent per week     Comment: SOCIAL     Drug use: Yes     Types: Marijuana       Review of Systems   Constitutional:  Negative for fever.   HENT:  Negative for sore throat.    Eyes:  Negative for visual disturbance.   Respiratory:  Negative for shortness of breath.    Cardiovascular:  Negative for chest pain.   Gastrointestinal:  Negative for abdominal pain and vomiting.   Musculoskeletal:  Positive for arthralgias, gait problem and myalgias.   Skin:  Negative for wound.   Neurological:  Negative for dizziness and weakness.   Hematological:  Bruises/bleeds easily.   Psychiatric/Behavioral:  Negative for behavioral problems.        Physical Exam  Physical  Exam  Vitals and nursing note reviewed.   Constitutional:       General: She is not in acute distress.     Appearance: Normal appearance. She is well-developed.   HENT:      Head: Normocephalic and atraumatic.      Nose: Nose normal.      Mouth/Throat:      Mouth: Mucous membranes are moist.      Pharynx: Oropharynx is clear.   Eyes:      Conjunctiva/sclera: Conjunctivae normal.   Cardiovascular:      Rate and Rhythm: Normal rate.   Pulmonary:      Effort: Pulmonary effort is normal.   Musculoskeletal:         General: Swelling, tenderness and signs of injury present. No deformity. Normal range of motion.      Cervical back: Normal range of motion.      Comments: LLE: Mild diffuse tenderness noted over left ankle joint medial aspect > lateral aspect.  No pain to toes, metatarsals, no pain along fifth metatarsal however there is some swelling and ecchymosis noted along lateral dorsal aspect of foot.  Full range of motion maintained, distal neurovascular intact   Skin:     General: Skin is warm and dry.      Findings: Bruising present. No erythema.   Neurological:      General: No focal deficit present.      Mental Status: She is alert.   Psychiatric:         Behavior: Behavior normal.         Vital Signs  ED Triage Vitals [02/06/24 0907]   Temperature Pulse Respirations Blood Pressure SpO2   97.7 °F (36.5 °C) 84 17 140/74 98 %      Temp Source Heart Rate Source Patient Position - Orthostatic VS BP Location FiO2 (%)   Tympanic -- -- Left arm --      Pain Score       5           Vitals:    02/06/24 0907   BP: 140/74   Pulse: 84         Visual Acuity      ED Medications  Medications   acetaminophen (TYLENOL) tablet 975 mg (975 mg Oral Given 2/6/24 0924)       Diagnostic Studies  Results Reviewed       None                   XR ankle 3+ views LEFT   ED Interpretation by Karen Whitman PA-C (02/06 0943)   No fx      Final Result by Grayson Hoff MD (02/06 1131)      No acute osseous abnormality.             Workstation performed: [id number]         XR foot 3+ views LEFT   ED Interpretation by Karen Whitman PA-C (02/06 0943)   No fx,       Final Result by Grayson Hoff MD (02/06 1129)      No acute osseous abnormality.            Workstation performed: [id number]                    Procedures  Procedures         ED Course                                             Medical Decision Making  DDx:, Strain, sprain, contusion, avulsion fracture, fracture, among others  No fracture seen likely foot contusion, ankle sprain.  Discussed treatment options with patient including Ace wrap, Aircast, cam walker.  Patient would like to try CAM Walker first to help promote healing.  Recommend follow-up with orthopedist or podiatrist as needed      Amount and/or Complexity of Data Reviewed  Radiology: ordered and independent interpretation performed. Decision-making details documented in ED Course.    Risk  OTC drugs.             Disposition  Final diagnoses:   Sprain of left ankle, unspecified ligament, initial encounter   Contusion of left foot, initial encounter - Left foot/ankle     Time reflects when diagnosis was documented in both MDM as applicable and the Disposition within this note       Time User Action Codes Description Comment    2/6/2024  9:49 AM Karen Whitman Add [S93.402A] Sprain of left ankle, unspecified ligament, initial encounter     2/6/2024  9:49 AM Karen Whitman Add [S90.32XA] Contusion of left foot, initial encounter     2/6/2024  9:49 AM Karen Whitman Modify [S90.32XA] Contusion of left foot, initial encounter Left foot/ankle          ED Disposition       ED Disposition   Discharge    Condition   Stable    Date/Time   Tue Feb 6, 2024  9:49 AM    Comment   Stephanie Moses discharge to home/self care.                   Follow-up Information       Follow up With Specialties Details Why Contact Info Additional Information    Sophia Stallings MD Internal Medicine, Pediatrics   [address]  [address]  [facility] [address]  [phone]       [facility]   [address] [address] [phone] [facility] [address] [address] [phone]    [facility]   [address] [place] [address]  [phone]               Discharge Medication List as of 2/6/2024  9:50 AM        CONTINUE these medications which have NOT CHANGED    Details   albuterol (2.5 mg/3 mL) 0.083 % nebulizer solution inhale contents of 1 vial in nebulizer every 4 hours if needed for wheezing, Historical Med      buPROPion (Wellbutrin XL) 150 mg 24 hr tablet Take 1 tablet (150 mg total) by mouth daily, Starting Thu 1/11/2024, Normal      chlorhexidine (PERIDEX) 0.12 % solution SWISH AND SPIT TAKE 15ML BY MOUTH FOR 30 SECONDS TWICE A DAY AS DIRECTED, Historical Med      drospirenone-ethinyl estradiol (DAYTON) 3-0.02 MG per tablet Take 1 tablet by mouth daily, Starting Mon 11/20/2023, Until Mon 2/12/2024, Normal      EPINEPHrine (EPIPEN) 0.3 mg/0.3 mL SOAJ USE ASE DIRECTED BY MD, Historical Med      methocarbamol (ROBAXIN) 500 mg tablet TAKE 1 TABLET (500 MG TOTAL) BY MOUTH 2 (TWO) TIMES A DAY AS NEEDED FOR MUSCLE SPASMS, Starting Fri 1/12/2024, Normal      naltrexone (REVIA) 50 mg tablet Take 0.5 tablets (25 mg total) by mouth daily, Starting Fri 1/19/2024, Normal      nystatin (MYCOSTATIN) powder Apply topically 3 (three) times a day, Starting Thu 6/8/2023, Normal      VENTOLIN  (90 Base) MCG/ACT inhaler inhale 2 puff by inhalation route  every 8 hours as needed, Historical Med             No discharge procedures on file.    PDMP Review         Value Time User    PDMP Reviewed  Yes 7/10/2023  8:29 AM Efe Ortega MD            ED Provider  Electronically Signed by             Karen Whitman PA-C  02/06/24 1606

## 2024-02-06 NOTE — DISCHARGE INSTRUCTIONS
Use Ace wrap, Splint for next few days for comfort, taking off to bathe or sleep or until follow-up.  Use Tylenol 650 mg every 4 hours or Anti-inflammatories like Advil, Motrin, Ibuprofen, Aleve every 6 hours; you can alternate the 2 medications taking something every 3 hours for pain.      Follow-up with orthopedic doctor or podiatry in the next few days if no improvement in condition.

## 2024-02-08 ENCOUNTER — TELEPHONE (OUTPATIENT)
Dept: BARIATRICS | Facility: CLINIC | Age: 34
End: 2024-02-08

## 2024-02-08 ENCOUNTER — TELEPHONE (OUTPATIENT)
Dept: OTHER | Facility: OTHER | Age: 34
End: 2024-02-08

## 2024-02-08 NOTE — TELEPHONE ENCOUNTER
Patient is calling regarding cancelling an appointment.    Date/Time: 2/8/2024 / 10:00 am    Patient was rescheduled: YES [] NO [x]    Patient requesting call back to reschedule: YES [x] NO []

## 2024-02-14 ENCOUNTER — CONSULT (OUTPATIENT)
Dept: PLASTIC SURGERY | Facility: CLINIC | Age: 34
End: 2024-02-14
Payer: COMMERCIAL

## 2024-02-14 VITALS
HEIGHT: 62 IN | BODY MASS INDEX: 32.94 KG/M2 | DIASTOLIC BLOOD PRESSURE: 90 MMHG | WEIGHT: 179 LBS | SYSTOLIC BLOOD PRESSURE: 132 MMHG

## 2024-02-14 DIAGNOSIS — N62 MACROMASTIA: Primary | ICD-10-CM

## 2024-02-14 PROCEDURE — 99204 OFFICE O/P NEW MOD 45 MIN: CPT | Performed by: STUDENT IN AN ORGANIZED HEALTH CARE EDUCATION/TRAINING PROGRAM

## 2024-02-14 NOTE — PROGRESS NOTES
Assessment and Plan:  Ms. Moses is a 34 y.o. female presenting with symptomatic macromastia    We discussed the procedure, risks, benefits, alternatives and postoperative instructions and expectations. The patient is an excellent candidate for BBR.  All patient's questions were answered and she voiced understanding.  Booking form submitted.    History of Present Illness:   Ms. Moses is a 34 y.o. female presenting with symptomatic macromastia.  She has a 38DDD and it has been this size for years.  She notes small fluctuations in size with weight loss/gain and pregnancy but essentially have been disporoportionately large since puberty.  She is active with going to the gym at least 3x/week and with her 7 yo son.  She is not planning to have any additional children.  She denies nicotine use.  Has a cousin with breast cancer but no other relatives.  She reports rashes of her IMF and back/neck/should discomfort.      Review of Systems:  A 12 point ROS was performed and negative except per HPI.    Past Medical History:  Past Medical History:   Diagnosis Date    Allergic     Anxiety     Asthma     Clotting disorder (HCC)     Depression     Diabetes mellitus (HCC)     Eating disorder     Enlarged thyroid     Obesity     Uterine fibroid     Visual impairment        Past Surgical History:  Past Surgical History:   Procedure Laterality Date    TOOTH EXTRACTION  2005       Social History:  Social History     Tobacco Use    Smoking status: Former     Current packs/day: 0.50     Average packs/day: 0.5 packs/day for 10.0 years (5.0 ttl pk-yrs)     Types: Cigarettes    Smokeless tobacco: Never   Vaping Use    Vaping status: Never Used   Substance Use Topics    Alcohol use: Yes     Alcohol/week: 2.0 - 3.0 standard drinks of alcohol     Types: 2 - 3 Standard drinks or equivalent per week     Comment: SOCIAL     Drug use: Yes     Types: Marijuana       Family History:  Family History   Problem Relation Age of Onset    No Known Problems  "Mother     Diabetes Father     Hypertension Father     Liver cancer Maternal Grandmother     Mental illness Maternal Grandmother     Hypertension Paternal Grandmother     Diabetes Paternal Grandfather     Asthma Paternal Aunt     Asthma Paternal Uncle        Allergies:  Allergies   Allergen Reactions    Grape (Artificial) Flavor - Food Allergy Anaphylaxis, Hives, Itching and Shortness Of Breath    Pollen Extract        Medications:  Current Outpatient Medications on File Prior to Visit   Medication Sig Dispense Refill    albuterol (2.5 mg/3 mL) 0.083 % nebulizer solution inhale contents of 1 vial in nebulizer every 4 hours if needed for wheezing  0    buPROPion (Wellbutrin XL) 150 mg 24 hr tablet Take 1 tablet (150 mg total) by mouth daily 90 tablet 0    chlorhexidine (PERIDEX) 0.12 % solution SWISH AND SPIT TAKE 15ML BY MOUTH FOR 30 SECONDS TWICE A DAY AS DIRECTED      drospirenone-ethinyl estradiol (DAYTON) 3-0.02 MG per tablet Take 1 tablet by mouth daily 84 tablet 0    EPINEPHrine (EPIPEN) 0.3 mg/0.3 mL SOAJ USE ASE DIRECTED BY MD  0    methocarbamol (ROBAXIN) 500 mg tablet TAKE 1 TABLET (500 MG TOTAL) BY MOUTH 2 (TWO) TIMES A DAY AS NEEDED FOR MUSCLE SPASMS 60 tablet 5    naltrexone (REVIA) 50 mg tablet Take 0.5 tablets (25 mg total) by mouth daily 45 tablet 0    nystatin (MYCOSTATIN) powder Apply topically 3 (three) times a day 15 g 5    VENTOLIN  (90 Base) MCG/ACT inhaler inhale 2 puff by inhalation route  every 8 hours as needed  0     No current facility-administered medications on file prior to visit.         Physical Examination:  /90   Ht 5' 2\" (1.575 m)   Wt 81.2 kg (179 lb)   BMI 32.74 kg/m²   Estimated body mass index is 32.74 kg/m² as calculated from the following:    Height as of this encounter: 5' 2\" (1.575 m).    Weight as of this encounter: 81.2 kg (179 lb).  General: NAD, well appearing, AAOx3  HEENT: NCAT, EOMI, MMM, supple  Resp: Nonlabored  Heart: RRR  Abdomen: Soft, ND, " NT  Extremities/MSK: no LE edema, no obvious deficits in ROM  Neuro: grossly intact with no obvious deficits  Skin: no obvious lesions or rashes  Breast: no palpable mass, no palpable axillary lymphadenopathy, grade II ptosis, N/IMF 14 cm, mild axillary excess      Fatuma Patrick, DO  Plastic and Reconstructive Surgery

## 2024-02-15 ENCOUNTER — TELEPHONE (OUTPATIENT)
Dept: BARIATRICS | Facility: CLINIC | Age: 34
End: 2024-02-15

## 2024-02-19 ENCOUNTER — APPOINTMENT (OUTPATIENT)
Dept: LAB | Facility: HOSPITAL | Age: 34
End: 2024-02-19
Payer: COMMERCIAL

## 2024-02-19 ENCOUNTER — CLINICAL SUPPORT (OUTPATIENT)
Dept: BARIATRICS | Facility: CLINIC | Age: 34
End: 2024-02-19

## 2024-02-19 VITALS
WEIGHT: 182.6 LBS | HEART RATE: 74 BPM | BODY MASS INDEX: 31.18 KG/M2 | HEIGHT: 64 IN | TEMPERATURE: 98.2 F | RESPIRATION RATE: 16 BRPM | SYSTOLIC BLOOD PRESSURE: 110 MMHG | DIASTOLIC BLOOD PRESSURE: 70 MMHG

## 2024-02-19 DIAGNOSIS — E66.9 OBESITY (BMI 30.0-34.9): ICD-10-CM

## 2024-02-19 DIAGNOSIS — E16.1 HYPERINSULINEMIA: ICD-10-CM

## 2024-02-19 DIAGNOSIS — R63.5 ABNORMAL WEIGHT GAIN: Primary | ICD-10-CM

## 2024-02-19 DIAGNOSIS — R74.01 TRANSAMINITIS: ICD-10-CM

## 2024-02-19 DIAGNOSIS — E61.1 IRON DEFICIENCY: ICD-10-CM

## 2024-02-19 LAB
ALBUMIN SERPL BCP-MCNC: 4 G/DL (ref 3.5–5)
ALP SERPL-CCNC: 96 U/L (ref 34–104)
ALT SERPL W P-5'-P-CCNC: 17 U/L (ref 7–52)
ANION GAP SERPL CALCULATED.3IONS-SCNC: 10 MMOL/L
AST SERPL W P-5'-P-CCNC: 11 U/L (ref 13–39)
BASOPHILS # BLD AUTO: 0.03 THOUSANDS/ÂΜL (ref 0–0.1)
BASOPHILS NFR BLD AUTO: 1 % (ref 0–1)
BILIRUB SERPL-MCNC: 0.22 MG/DL (ref 0.2–1)
BUN SERPL-MCNC: 7 MG/DL (ref 5–25)
CALCIUM SERPL-MCNC: 8.8 MG/DL (ref 8.4–10.2)
CHLORIDE SERPL-SCNC: 104 MMOL/L (ref 96–108)
CO2 SERPL-SCNC: 24 MMOL/L (ref 21–32)
CREAT SERPL-MCNC: 0.8 MG/DL (ref 0.6–1.3)
EOSINOPHIL # BLD AUTO: 0.2 THOUSAND/ÂΜL (ref 0–0.61)
EOSINOPHIL NFR BLD AUTO: 3 % (ref 0–6)
ERYTHROCYTE [DISTWIDTH] IN BLOOD BY AUTOMATED COUNT: 13.4 % (ref 11.6–15.1)
EST. AVERAGE GLUCOSE BLD GHB EST-MCNC: 105 MG/DL
FERRITIN SERPL-MCNC: 5 NG/ML (ref 11–307)
GFR SERPL CREATININE-BSD FRML MDRD: 95 ML/MIN/1.73SQ M
GGT SERPL-CCNC: 25 U/L (ref 9–64)
GLUCOSE SERPL-MCNC: 123 MG/DL (ref 65–140)
HBA1C MFR BLD: 5.3 %
HCT VFR BLD AUTO: 40 % (ref 34.8–46.1)
HGB BLD-MCNC: 12.4 G/DL (ref 11.5–15.4)
IMM GRANULOCYTES # BLD AUTO: 0.01 THOUSAND/UL (ref 0–0.2)
IMM GRANULOCYTES NFR BLD AUTO: 0 % (ref 0–2)
IRON SATN MFR SERPL: 6 % (ref 15–50)
IRON SERPL-MCNC: 27 UG/DL (ref 50–212)
LYMPHOCYTES # BLD AUTO: 2.8 THOUSANDS/ÂΜL (ref 0.6–4.47)
LYMPHOCYTES NFR BLD AUTO: 45 % (ref 14–44)
MCH RBC QN AUTO: 27.6 PG (ref 26.8–34.3)
MCHC RBC AUTO-ENTMCNC: 31 G/DL (ref 31.4–37.4)
MCV RBC AUTO: 89 FL (ref 82–98)
MONOCYTES # BLD AUTO: 0.5 THOUSAND/ÂΜL (ref 0.17–1.22)
MONOCYTES NFR BLD AUTO: 8 % (ref 4–12)
NEUTROPHILS # BLD AUTO: 2.65 THOUSANDS/ÂΜL (ref 1.85–7.62)
NEUTS SEG NFR BLD AUTO: 43 % (ref 43–75)
NRBC BLD AUTO-RTO: 0 /100 WBCS
PLATELET # BLD AUTO: 202 THOUSANDS/UL (ref 149–390)
PMV BLD AUTO: 11.3 FL (ref 8.9–12.7)
POTASSIUM SERPL-SCNC: 3.9 MMOL/L (ref 3.5–5.3)
PROT SERPL-MCNC: 7.5 G/DL (ref 6.4–8.4)
RBC # BLD AUTO: 4.49 MILLION/UL (ref 3.81–5.12)
SODIUM SERPL-SCNC: 138 MMOL/L (ref 135–147)
TIBC SERPL-MCNC: 469 UG/DL (ref 250–450)
UIBC SERPL-MCNC: 442 UG/DL (ref 155–355)
WBC # BLD AUTO: 6.19 THOUSAND/UL (ref 4.31–10.16)

## 2024-02-19 PROCEDURE — 36415 COLL VENOUS BLD VENIPUNCTURE: CPT

## 2024-02-19 PROCEDURE — 83550 IRON BINDING TEST: CPT

## 2024-02-19 PROCEDURE — 83540 ASSAY OF IRON: CPT

## 2024-02-19 PROCEDURE — 83036 HEMOGLOBIN GLYCOSYLATED A1C: CPT

## 2024-02-19 PROCEDURE — 85025 COMPLETE CBC W/AUTO DIFF WBC: CPT

## 2024-02-19 PROCEDURE — RECHECK

## 2024-02-19 PROCEDURE — 82977 ASSAY OF GGT: CPT

## 2024-02-19 PROCEDURE — 80053 COMPREHEN METABOLIC PANEL: CPT

## 2024-02-19 PROCEDURE — 82728 ASSAY OF FERRITIN: CPT

## 2024-02-19 NOTE — PROGRESS NOTES
Patient last visit weight:  Patient current visit weight:    If you are taking phentermine or other oral weight loss medications, are you experiencing any of the following symptoms:  Headache: NO  Blurred Vision: NO  Chest Pain: NO  Palpitations:NO  Insomnia: YES  SPECIFY ORAL MEDICATION AND DOSAGE: WELLBUTRIN AND NALTREXONE    If you are taking an injectable medication,  are you experiencing any of the following symptoms:  Bloating:   Nausea:  Vomiting:   Constipation:   Diarrhea:  SPECIFY INJECTABLE MEDICATION AND CURRENT DOSAGE:      Vitals:    Is BP less than 100/60?NO  Is BP greater than 140/90?NO  Is HR greater than 100?NO  **If yes to any of the above, have patient relax and repeat in 5-10 minutes**    Repeat values:    Is BP less than 100/60?  Is BP greater than 140/90?  Is HR greater than 100?  **If values remain outside of ranges above, please consult provider for next steps**

## 2024-02-20 ENCOUNTER — TELEPHONE (OUTPATIENT)
Dept: BARIATRICS | Facility: CLINIC | Age: 34
End: 2024-02-20

## 2024-02-20 NOTE — TELEPHONE ENCOUNTER
----- Message from Gabriel Moon DO sent at 2/20/2024  8:12 AM EST -----  Her iron level remains very low.  Please ask the patient if she is taking an iron supplement and remind her to if not.

## 2024-02-20 NOTE — TELEPHONE ENCOUNTER
Pt calling back.  Notified of lab results.  Pt states she is not taking any Iron.  Reviewed provider recommendations to take Iron supplement.  Pt verbalizes understanding.

## 2024-02-26 NOTE — PROGRESS NOTES
Assessment/Plan   Problem List Items Addressed This Visit    None  Visit Diagnoses       Well woman exam    -  Primary    Encounter for initial prescription of contraceptive pills        Relevant Medications    drospirenone-ethinyl estradiol (DAYTON) 3-0.02 MG per tablet    Routine screening for STI (sexually transmitted infection)        Relevant Orders    Chlamydia/GC amplified DNA by PCR    Trichomonas vaginalis/Mycoplasma genitalium PCR            Discussion  I have discussed the importance of monthly self-breast exams, exercise and healthy diet.    Encourage safe sexual practices; STI testing - desires  Contraception - LAVELLE    The current ASCCP guidelines were reviewed. Patient's last pap was 2023 and therefore, a pap with HPV cotesting is not indicated at this time.    Breast cancer screening is not indicated at this time    All questions have been answered to her satisfaction  RTO for APE or sooner if needed      Subjective     HPI   Stephanie Moses is a 34 y.o. female who presents for annual well woman exam.     LMP - 02/19/24; Periods are reg q 28-34 days and last 7-10  days; Bleeding is normal. +dysmenorrhea, +bloating    No vulvar itch/burn; No vaginal itch/burn; No abn discharge or odor;     No urinary sx - burning/pain/frequency/hematuria    (+) SBEs - no breast masses, asymmetry, nipple discharge or bleeding, changes in skin of breast, or breast tenderness bilaterally. She is in the process of breast reduction surgery.     No abd/pelvic pain or HAs;     Pt is sexually active in a mutually monog/ sexual relationship; + low sex drive. Recent CBC indicates DAVID; encouraged to start iron supplements, may improve fatigue, which in turn will increase sex drive.    No issues with intercourse;  Feels safe at home    Current contraception: LAVELLE  Condom use: no      (+) PCP for routine Bw/care;        Review of Systems   Constitutional:  Negative for fatigue.   Eyes:  Negative for photophobia and visual  disturbance.   Respiratory:  Negative for cough and shortness of breath.    Cardiovascular:  Negative for chest pain and palpitations.   Gastrointestinal:  Negative for abdominal pain, blood in stool, constipation, diarrhea, nausea and rectal pain.   Genitourinary:  Negative for dyspareunia, dysuria, flank pain, frequency, genital sores, menstrual problem, pelvic pain, urgency, vaginal bleeding, vaginal discharge and vaginal pain.   Musculoskeletal:  Negative for arthralgias and back pain.   Skin:  Negative for rash.   Neurological:  Negative for weakness and headaches.       The following portions of the patient's history were reviewed and updated as appropriate: allergies, current medications, past family history, past medical history, past social history, past surgical history, and problem list.         OB History          2    Para   1    Term   1            AB   1    Living   1         SAB   1    IAB        Ectopic        Multiple        Live Births   1           Obstetric Comments   MENARCHE- AGE 11               Past Medical History:   Diagnosis Date    Allergic     Anxiety     Asthma     Clotting disorder (HCC)     Depression     Diabetes mellitus (HCC)     Eating disorder     Enlarged thyroid     Obesity     Uterine fibroid     Visual impairment        Past Surgical History:   Procedure Laterality Date    TOOTH EXTRACTION         Family History   Problem Relation Age of Onset    No Known Problems Mother     Diabetes Father     Hypertension Father     Liver cancer Maternal Grandmother     Mental illness Maternal Grandmother     Hypertension Paternal Grandmother     Diabetes Paternal Grandfather     Asthma Paternal Aunt     Asthma Paternal Uncle        Social History     Socioeconomic History    Marital status: Single     Spouse name: Not on file    Number of children: Not on file    Years of education: Not on file    Highest education level: Not on file   Occupational History    Occupation:  [employer]    Tobacco Use    Smoking status: Former     Current packs/day: 0.50     Average packs/day: 0.5 packs/day for 10.0 years (5.0 ttl pk-yrs)     Types: Cigarettes    Smokeless tobacco: Never   Vaping Use    Vaping status: Never Used   Substance and Sexual Activity    Alcohol use: Yes     Alcohol/week: 2.0 - 3.0 standard drinks of alcohol     Types: 2 - 3 Standard drinks or equivalent per week     Comment: SOCIAL     Drug use: Yes     Frequency: 6.0 times per week     Types: Marijuana    Sexual activity: Yes     Partners: Male     Birth control/protection: OCP   Other Topics Concern    Not on file   Social History Narrative    Not on file     Social Determinants of Health     Financial Resource Strain: Not on file   Food Insecurity: Not on file   Transportation Needs: Not on file   Physical Activity: Not on file   Stress: Not on file   Social Connections: Not on file   Intimate Partner Violence: Not on file   Housing Stability: Not on file         Current Outpatient Medications:     albuterol (2.5 mg/3 mL) 0.083 % nebulizer solution, as needed, Disp: , Rfl: 0    buPROPion (Wellbutrin XL) 150 mg 24 hr tablet, Take 1 tablet (150 mg total) by mouth daily, Disp: 90 tablet, Rfl: 0    drospirenone-ethinyl estradiol (DAYTON) 3-0.02 MG per tablet, Take 1 tablet by mouth daily, Disp: 84 tablet, Rfl: 3    EPINEPHrine (EPIPEN) 0.3 mg/0.3 mL SOAJ, USE ASE DIRECTED BY MD, Disp: , Rfl: 0    methocarbamol (ROBAXIN) 500 mg tablet, TAKE 1 TABLET (500 MG TOTAL) BY MOUTH 2 (TWO) TIMES A DAY AS NEEDED FOR MUSCLE SPASMS (Patient taking differently: Take 500 mg by mouth as needed for muscle spasms), Disp: 60 tablet, Rfl: 5    naltrexone (REVIA) 50 mg tablet, Take 0.5 tablets (25 mg total) by mouth daily, Disp: 45 tablet, Rfl: 0    nystatin (MYCOSTATIN) powder, Apply topically 3 (three) times a day, Disp: 15 g, Rfl: 5    chlorhexidine (PERIDEX) 0.12 % solution, SWISH AND SPIT TAKE 15ML BY MOUTH FOR 30 SECONDS TWICE A DAY AS DIRECTED  "(Patient not taking: Reported on 2/19/2024), Disp: , Rfl:     VENTOLIN  (90 Base) MCG/ACT inhaler, as needed (Patient not taking: Reported on 3/1/2024), Disp: , Rfl: 0    Allergies   Allergen Reactions    Grape (Artificial) Flavor - Food Allergy Anaphylaxis, Hives, Itching and Shortness Of Breath    Pollen Extract        Objective   Vitals:    03/01/24 1039   BP: 118/78   BP Location: Left arm   Patient Position: Sitting   Cuff Size: Standard   Weight: 83 kg (183 lb)   Height: 5' 4\" (1.626 m)     Physical Exam  Vitals and nursing note reviewed.   Constitutional:       Appearance: Normal appearance. She is well-developed and normal weight.   HENT:      Head: Normocephalic and atraumatic.   Eyes:      Conjunctiva/sclera: Conjunctivae normal.   Cardiovascular:      Rate and Rhythm: Normal rate and regular rhythm.      Heart sounds: Normal heart sounds.   Pulmonary:      Effort: Pulmonary effort is normal.      Breath sounds: Normal breath sounds.   Chest:   Breasts:     Breasts are symmetrical.      Right: Normal. No inverted nipple, mass, nipple discharge, skin change or tenderness.      Left: Normal. No inverted nipple, mass, nipple discharge, skin change or tenderness.   Abdominal:      General: Abdomen is flat. There is no distension.      Palpations: Abdomen is soft. There is no mass.      Tenderness: There is no abdominal tenderness. There is no right CVA tenderness or left CVA tenderness.   Genitourinary:     General: Normal vulva.      Exam position: Lithotomy position.      Pubic Area: No rash or pubic lice.       Labia:         Right: No rash or tenderness.         Left: No rash or tenderness.       Urethra: No urethral pain.      Vagina: Normal. No vaginal discharge.      Cervix: Normal.      Uterus: Normal. No uterine prolapse.       Adnexa: Right adnexa normal and left adnexa normal.        Right: No mass or tenderness.          Left: No mass or tenderness.     Musculoskeletal:         General: No " tenderness. Normal range of motion.      Cervical back: Normal range of motion. No tenderness.      Right lower leg: No edema.      Left lower leg: No edema.   Lymphadenopathy:      Cervical: No cervical adenopathy.      Upper Body:      Right upper body: No supraclavicular or axillary adenopathy.      Left upper body: No supraclavicular or axillary adenopathy.      Lower Body: No right inguinal adenopathy. No left inguinal adenopathy.   Skin:     General: Skin is warm and dry.   Neurological:      Mental Status: She is alert and oriented to person, place, and time.      Motor: No weakness.   Psychiatric:         Mood and Affect: Mood normal.         Behavior: Behavior normal.         Thought Content: Thought content normal.         Judgment: Judgment normal.         There are no Patient Instructions on file for this visit.

## 2024-03-01 ENCOUNTER — ANNUAL EXAM (OUTPATIENT)
Dept: OBGYN CLINIC | Facility: CLINIC | Age: 34
End: 2024-03-01
Payer: COMMERCIAL

## 2024-03-01 VITALS
WEIGHT: 183 LBS | HEIGHT: 64 IN | DIASTOLIC BLOOD PRESSURE: 78 MMHG | BODY MASS INDEX: 31.24 KG/M2 | SYSTOLIC BLOOD PRESSURE: 118 MMHG

## 2024-03-01 DIAGNOSIS — Z30.011 ENCOUNTER FOR INITIAL PRESCRIPTION OF CONTRACEPTIVE PILLS: ICD-10-CM

## 2024-03-01 DIAGNOSIS — Z11.3 ROUTINE SCREENING FOR STI (SEXUALLY TRANSMITTED INFECTION): ICD-10-CM

## 2024-03-01 DIAGNOSIS — Z01.419 WELL WOMAN EXAM: Primary | ICD-10-CM

## 2024-03-01 PROCEDURE — 87491 CHLMYD TRACH DNA AMP PROBE: CPT

## 2024-03-01 PROCEDURE — 99395 PREV VISIT EST AGE 18-39: CPT

## 2024-03-01 PROCEDURE — 87591 N.GONORRHOEAE DNA AMP PROB: CPT

## 2024-03-01 RX ORDER — DROSPIRENONE AND ETHINYL ESTRADIOL 0.02-3(28)
1 KIT ORAL DAILY
Qty: 84 TABLET | Refills: 3 | Status: SHIPPED | OUTPATIENT
Start: 2024-03-01 | End: 2025-01-31

## 2024-03-02 LAB
C TRACH DNA SPEC QL NAA+PROBE: NEGATIVE
N GONORRHOEA DNA SPEC QL NAA+PROBE: NEGATIVE

## 2024-03-06 DIAGNOSIS — E66.9 OBESITY (BMI 30.0-34.9): ICD-10-CM

## 2024-03-06 RX ORDER — NALTREXONE HYDROCHLORIDE 50 MG/1
25 TABLET, FILM COATED ORAL DAILY
Qty: 45 TABLET | Refills: 0 | Status: SHIPPED | OUTPATIENT
Start: 2024-03-06

## 2024-03-06 RX ORDER — BUPROPION HYDROCHLORIDE 150 MG/1
150 TABLET ORAL DAILY
Qty: 90 TABLET | Refills: 0 | Status: SHIPPED | OUTPATIENT
Start: 2024-03-06

## 2024-03-11 ENCOUNTER — PREP FOR PROCEDURE (OUTPATIENT)
Dept: PLASTIC SURGERY | Facility: CLINIC | Age: 34
End: 2024-03-11

## 2024-03-11 DIAGNOSIS — N62 MACROMASTIA: Primary | ICD-10-CM

## 2024-03-27 NOTE — TELEPHONE ENCOUNTER
4 Eyes Skin Assessment Completed by LISHA Reyes and LISHA Santiago.    Head Scab, Bruising, Swelling, and Redness  Ears WDL  Nose WDL  Mouth WDL  Neck WDL  Breast/Chest Bruising  Shoulder Blades WDL  Spine Bruising  (R) Arm/Elbow/Hand Discoloration  (L) Arm/Elbow/Hand Discoloration  Abdomen Bruising  Groin WDL  Scrotum/Coccyx/Buttocks WDL  (R) Leg Scar and Bruising  (L) Leg Scar and Bruising  (R) Heel/Foot/Toe Redness, Blanching, and Discoloration  (L) Heel/Foot/Toe Redness, Blanching, and Discoloration          Devices In Places Tele Box      Interventions In Place Pillows    Possible Skin Injury Yes    Pictures Uploaded Into Epic Yes  Wound Consult Placed N/A, plastic surgeon came and saw patient regarding wounds from fall.  RN Wound Prevention Protocol Ordered No    Called patient to schedule breast reduction consultation. Patient will look on website and call back to schedule with the doctor of her choice.

## 2024-04-22 ENCOUNTER — OFFICE VISIT (OUTPATIENT)
Dept: PLASTIC SURGERY | Facility: CLINIC | Age: 34
End: 2024-04-22
Payer: COMMERCIAL

## 2024-04-22 VITALS
SYSTOLIC BLOOD PRESSURE: 126 MMHG | BODY MASS INDEX: 34.78 KG/M2 | HEART RATE: 84 BPM | HEIGHT: 62 IN | WEIGHT: 189 LBS | DIASTOLIC BLOOD PRESSURE: 80 MMHG | TEMPERATURE: 98.7 F

## 2024-04-22 DIAGNOSIS — N62 MACROMASTIA: Primary | ICD-10-CM

## 2024-04-22 PROCEDURE — 99214 OFFICE O/P EST MOD 30 MIN: CPT | Performed by: PHYSICIAN ASSISTANT

## 2024-04-22 NOTE — H&P (VIEW-ONLY)
H&P- Plastic Surgery  Stephanie Moses 34 y.o. female MRN: [id number]  Unit/Bed#:  Encounter: [id number]      ASSESSMENT/PLAN:    34 year old female with pmh of macromastia, presenting for pre op visit for bilateral breast reduction scheduled for 5/6/24    - consent obtained. Discussed risks of asymmetry, recurrence, delayed wound healing, NAC necrosis. Patient's questions answered to her satisfaction  - instructed patient to go over her medication list at time of pre op call  - discussed post operative medications. These will be sent to her pharmacy 1-2 days prior to surgery  - post operative instructions discussed  - Hibiclens given  - call with any questions/concerns prior    Medical Problems       Problem List       Asthma    Uterine fibroid in antepartum period    Irregular menses    Chronic bilateral low back pain    Obesity (BMI 30.0-34.9)    Iron deficiency          Reason for Consult / Principal Problem: pre op for bilateral breast reduction    HPI: Stephanie Moses is a 34 y.o. year old female who presents for pre operative visit for bilateral breast reduction scheduled for 5/6/24. Patient is excited and anxious for surgery. She does currently have back and neck pain. She would like to be able to go to the gym more but her macromastia hinders her ability. She would also like to be more active for her young son. Surgery discussed in detail. She is well informed. She is not currently on a blood thinner. She does not smoke. She is currently taking naltrexone and wellbutrin for weight loss but her breast remain stable in size.       Review of Systems   Constitutional:  Negative for appetite change and fever.   HENT:  Negative for congestion and sore throat.    Respiratory:  Negative for shortness of breath.    Cardiovascular:  Negative for chest pain and leg swelling.   Gastrointestinal:  Negative for abdominal pain, nausea and vomiting.   Musculoskeletal:  Positive for back pain and neck pain.   Skin:  " Positive for rash. Negative for wound.   Neurological:  Negative for light-headedness and headaches.   Hematological:  Does not bruise/bleed easily.   Psychiatric/Behavioral:  Negative for behavioral problems and confusion.          Past Medical History:  Past Medical History:   Diagnosis Date    Allergic     Anxiety     Asthma     Clotting disorder (HCC)     Depression     Diabetes mellitus (HCC)     Eating disorder     Enlarged thyroid     Obesity     Uterine fibroid     Visual impairment        Past Surgical History:  Past Surgical History:   Procedure Laterality Date    TOOTH EXTRACTION  2005       Social History:  Social History     Substance and Sexual Activity   Alcohol Use Yes    Alcohol/week: 2.0 - 3.0 standard drinks of alcohol    Types: 2 - 3 Standard drinks or equivalent per week    Comment: SOCIAL      Social History     Substance and Sexual Activity   Drug Use Yes    Frequency: 6.0 times per week    Types: Marijuana     Social History     Tobacco Use   Smoking Status Former    Current packs/day: 0.50    Average packs/day: 0.5 packs/day for 10.0 years (5.0 ttl pk-yrs)    Types: Cigarettes   Smokeless Tobacco Never       Family History:  Family History   Problem Relation Age of Onset    No Known Problems Mother     Diabetes Father     Hypertension Father     Liver cancer Maternal Grandmother     Mental illness Maternal Grandmother     Hypertension Paternal Grandmother     Diabetes Paternal Grandfather     Asthma Paternal Aunt     Asthma Paternal Uncle        Allergies:  Allergies   Allergen Reactions    Grape (Artificial) Flavor - Food Allergy Anaphylaxis, Hives, Itching and Shortness Of Breath    Pollen Extract        Medications:  (Not in a hospital admission)    No current facility-administered medications for this visit.       Vitals:  /80 (BP Location: Right arm, Patient Position: Sitting, Cuff Size: Standard)   Pulse 84   Temp 98.7 °F (37.1 °C) (Tympanic)   Ht 5' 2\" (1.575 m)   Wt 85.7 " "kg (189 lb)   BMI 34.57 kg/m²   Body mass index is 34.57 kg/m².  Weight (last 2 days)       Date/Time Weight    04/22/24 1433 85.7 (189)          PHYSICAL EXAM  General appearance: alert and oriented, in no acute distress  Skin:  color, texture, turgor normal  Head: Normocephalic, without obvious abnormality  Heart: regular rate and rhythm, S1, S2 normal, no murmur, click, rub or gallop  Lungs: clear to auscultation bilaterally  Abdomen: soft, non-tender; bowel sounds normal; no masses,  no organomegaly  Neurological: normal without focal findings and mental status, speech normal, alert and oriented x3  Extremities:normal ROM, no swelling    Lab Results and Cultures:   CBC with diff:   Lab Results   Component Value Date    WBC 6.19 02/19/2024    HGB 12.4 02/19/2024    HCT 40.0 02/19/2024    MCV 89 02/19/2024     02/19/2024    RBC 4.49 02/19/2024    MCH 27.6 02/19/2024    MCHC 31.0 (L) 02/19/2024    RDW 13.4 02/19/2024    MPV 11.3 02/19/2024    NRBC 0 02/19/2024      BMP/CMP:  Lab Results   Component Value Date     10/23/2014    K 3.9 02/19/2024    K 4.0 09/17/2020     02/19/2024     09/17/2020    CO2 24 02/19/2024    CO2 25 09/17/2020    ANIONGAP 8 10/23/2014    BUN 7 02/19/2024    BUN 12 09/17/2020    CREATININE 0.80 02/19/2024    CREATININE 0.56 (L) 10/23/2014    GLUCOSE 97 10/23/2014    CALCIUM 8.8 02/19/2024    CALCIUM 9.0 09/17/2020    AST 11 (L) 02/19/2024    AST 11 09/17/2020    ALT 17 02/19/2024    ALT 10 09/17/2020    ALKPHOS 96 02/19/2024    ALKPHOS 100 09/17/2020    EGFR 95 02/19/2024   ,     Coags: No results found for: \"PT\", \"PTT\", \"INR\",          Lipid Panel: No results found for: \"CHOL\"  Lab Results   Component Value Date    HDL 43 (L) 04/28/2022     Lab Results   Component Value Date    HDL 43 (L) 04/28/2022     Lab Results   Component Value Date    LDLCALC 77 04/28/2022     Lab Results   Component Value Date    TRIG 207 (H) 04/28/2022       HgbA1c:   Lab Results   Component " "Value Date    HGBA1C 5.3 02/19/2024    HGBA1C 5.1 02/11/2022    HGBA1C 5.1 09/17/2020       Blood Culture: No results found for: \"BLOODCX\",   Urinalysis:   Lab Results   Component Value Date    COLORU [place] (A) 05/17/2022    COLORU Yellow 10/23/2014    CLARITYU Cloudy (A) 05/17/2022    CLARITYU Slightly Cloudy 10/23/2014    SPECGRAV >=1.030 05/17/2022    SPECGRAV 1.020 10/23/2014    PHUR 6.0 05/17/2022    PHUR 6.0 10/23/2014    LEUKOCYTESUR Trace (A) 05/17/2022    LEUKOCYTESUR Negative 10/23/2014    NITRITE Negative 05/17/2022    NITRITE Negative 10/23/2014    PROTEINUA Negative 10/23/2014    GLUCOSEU Trace (A) 05/17/2022    GLUCOSEU Negative 10/23/2014    KETONESU Trace (A) 05/17/2022    KETONESU 15 (1+) (A) 10/23/2014    BILIRUBINUR Negative 05/17/2022    BILIRUBINUR Negative 10/23/2014    BLOODU 3+ (A) 05/17/2022    BLOODU Moderate (A) 10/23/2014   ,   Urine Culture: No results found for: \"URINECX\",   Wound Culure:  No results found for: \"WOUNDCULT\"    Counseling / Coordination of Care  Total time spent today  30 minutes. Greater than 50% of total time was spent with the patient and / or family counseling and / or coordination of care.     Thank you for allowing me to participate in the care of Stephanie Moses. Please don't hesitate to call, text, email, or TigerText with any questions.     Gabbie Myers PA-C       "

## 2024-04-22 NOTE — PROGRESS NOTES
H&P- Plastic Surgery  Stephanie Moses 34 y.o. female MRN: [id number]  Unit/Bed#:  Encounter: [id number]      ASSESSMENT/PLAN:    34 year old female with pmh of macromastia, presenting for pre op visit for bilateral breast reduction scheduled for 5/6/24    - consent obtained. Discussed risks of asymmetry, recurrence, delayed wound healing, NAC necrosis. Patient's questions answered to her satisfaction  - instructed patient to go over her medication list at time of pre op call  - discussed post operative medications. These will be sent to her pharmacy 1-2 days prior to surgery  - post operative instructions discussed  - Hibiclens given  - call with any questions/concerns prior    Medical Problems       Problem List       Asthma    Uterine fibroid in antepartum period    Irregular menses    Chronic bilateral low back pain    Obesity (BMI 30.0-34.9)    Iron deficiency          Reason for Consult / Principal Problem: pre op for bilateral breast reduction    HPI: Stephanie Moses is a 34 y.o. year old female who presents for pre operative visit for bilateral breast reduction scheduled for 5/6/24. Patient is excited and anxious for surgery. She does currently have back and neck pain. She would like to be able to go to the gym more but her macromastia hinders her ability. She would also like to be more active for her young son. Surgery discussed in detail. She is well informed. She is not currently on a blood thinner. She does not smoke. She is currently taking naltrexone and wellbutrin for weight loss but her breast remain stable in size.       Review of Systems   Constitutional:  Negative for appetite change and fever.   HENT:  Negative for congestion and sore throat.    Respiratory:  Negative for shortness of breath.    Cardiovascular:  Negative for chest pain and leg swelling.   Gastrointestinal:  Negative for abdominal pain, nausea and vomiting.   Musculoskeletal:  Positive for back pain and neck pain.   Skin:  " Positive for rash. Negative for wound.   Neurological:  Negative for light-headedness and headaches.   Hematological:  Does not bruise/bleed easily.   Psychiatric/Behavioral:  Negative for behavioral problems and confusion.          Past Medical History:  Past Medical History:   Diagnosis Date    Allergic     Anxiety     Asthma     Clotting disorder (HCC)     Depression     Diabetes mellitus (HCC)     Eating disorder     Enlarged thyroid     Obesity     Uterine fibroid     Visual impairment        Past Surgical History:  Past Surgical History:   Procedure Laterality Date    TOOTH EXTRACTION  2005       Social History:  Social History     Substance and Sexual Activity   Alcohol Use Yes    Alcohol/week: 2.0 - 3.0 standard drinks of alcohol    Types: 2 - 3 Standard drinks or equivalent per week    Comment: SOCIAL      Social History     Substance and Sexual Activity   Drug Use Yes    Frequency: 6.0 times per week    Types: Marijuana     Social History     Tobacco Use   Smoking Status Former    Current packs/day: 0.50    Average packs/day: 0.5 packs/day for 10.0 years (5.0 ttl pk-yrs)    Types: Cigarettes   Smokeless Tobacco Never       Family History:  Family History   Problem Relation Age of Onset    No Known Problems Mother     Diabetes Father     Hypertension Father     Liver cancer Maternal Grandmother     Mental illness Maternal Grandmother     Hypertension Paternal Grandmother     Diabetes Paternal Grandfather     Asthma Paternal Aunt     Asthma Paternal Uncle        Allergies:  Allergies   Allergen Reactions    Grape (Artificial) Flavor - Food Allergy Anaphylaxis, Hives, Itching and Shortness Of Breath    Pollen Extract        Medications:  (Not in a hospital admission)    No current facility-administered medications for this visit.       Vitals:  /80 (BP Location: Right arm, Patient Position: Sitting, Cuff Size: Standard)   Pulse 84   Temp 98.7 °F (37.1 °C) (Tympanic)   Ht 5' 2\" (1.575 m)   Wt 85.7 " "kg (189 lb)   BMI 34.57 kg/m²   Body mass index is 34.57 kg/m².  Weight (last 2 days)       Date/Time Weight    04/22/24 1433 85.7 (189)          PHYSICAL EXAM  General appearance: alert and oriented, in no acute distress  Skin:  color, texture, turgor normal  Head: Normocephalic, without obvious abnormality  Heart: regular rate and rhythm, S1, S2 normal, no murmur, click, rub or gallop  Lungs: clear to auscultation bilaterally  Abdomen: soft, non-tender; bowel sounds normal; no masses,  no organomegaly  Neurological: normal without focal findings and mental status, speech normal, alert and oriented x3  Extremities:normal ROM, no swelling    Lab Results and Cultures:   CBC with diff:   Lab Results   Component Value Date    WBC 6.19 02/19/2024    HGB 12.4 02/19/2024    HCT 40.0 02/19/2024    MCV 89 02/19/2024     02/19/2024    RBC 4.49 02/19/2024    MCH 27.6 02/19/2024    MCHC 31.0 (L) 02/19/2024    RDW 13.4 02/19/2024    MPV 11.3 02/19/2024    NRBC 0 02/19/2024      BMP/CMP:  Lab Results   Component Value Date     10/23/2014    K 3.9 02/19/2024    K 4.0 09/17/2020     02/19/2024     09/17/2020    CO2 24 02/19/2024    CO2 25 09/17/2020    ANIONGAP 8 10/23/2014    BUN 7 02/19/2024    BUN 12 09/17/2020    CREATININE 0.80 02/19/2024    CREATININE 0.56 (L) 10/23/2014    GLUCOSE 97 10/23/2014    CALCIUM 8.8 02/19/2024    CALCIUM 9.0 09/17/2020    AST 11 (L) 02/19/2024    AST 11 09/17/2020    ALT 17 02/19/2024    ALT 10 09/17/2020    ALKPHOS 96 02/19/2024    ALKPHOS 100 09/17/2020    EGFR 95 02/19/2024   ,     Coags: No results found for: \"PT\", \"PTT\", \"INR\",          Lipid Panel: No results found for: \"CHOL\"  Lab Results   Component Value Date    HDL 43 (L) 04/28/2022     Lab Results   Component Value Date    HDL 43 (L) 04/28/2022     Lab Results   Component Value Date    LDLCALC 77 04/28/2022     Lab Results   Component Value Date    TRIG 207 (H) 04/28/2022       HgbA1c:   Lab Results   Component " "Value Date    HGBA1C 5.3 02/19/2024    HGBA1C 5.1 02/11/2022    HGBA1C 5.1 09/17/2020       Blood Culture: No results found for: \"BLOODCX\",   Urinalysis:   Lab Results   Component Value Date    COLORU [place] (A) 05/17/2022    COLORU Yellow 10/23/2014    CLARITYU Cloudy (A) 05/17/2022    CLARITYU Slightly Cloudy 10/23/2014    SPECGRAV >=1.030 05/17/2022    SPECGRAV 1.020 10/23/2014    PHUR 6.0 05/17/2022    PHUR 6.0 10/23/2014    LEUKOCYTESUR Trace (A) 05/17/2022    LEUKOCYTESUR Negative 10/23/2014    NITRITE Negative 05/17/2022    NITRITE Negative 10/23/2014    PROTEINUA Negative 10/23/2014    GLUCOSEU Trace (A) 05/17/2022    GLUCOSEU Negative 10/23/2014    KETONESU Trace (A) 05/17/2022    KETONESU 15 (1+) (A) 10/23/2014    BILIRUBINUR Negative 05/17/2022    BILIRUBINUR Negative 10/23/2014    BLOODU 3+ (A) 05/17/2022    BLOODU Moderate (A) 10/23/2014   ,   Urine Culture: No results found for: \"URINECX\",   Wound Culure:  No results found for: \"WOUNDCULT\"    Counseling / Coordination of Care  Total time spent today  30 minutes. Greater than 50% of total time was spent with the patient and / or family counseling and / or coordination of care.     Thank you for allowing me to participate in the care of Stephanie Moses. Please don't hesitate to call, text, email, or TigerText with any questions.     Gabbie Myers PA-C       "

## 2024-04-23 ENCOUNTER — APPOINTMENT (OUTPATIENT)
Dept: LAB | Facility: HOSPITAL | Age: 34
End: 2024-04-23
Payer: COMMERCIAL

## 2024-04-23 DIAGNOSIS — Z00.00 ANNUAL PHYSICAL EXAM: ICD-10-CM

## 2024-04-23 DIAGNOSIS — Z13.220 LIPID SCREENING: ICD-10-CM

## 2024-04-23 DIAGNOSIS — N62 MACROMASTIA: ICD-10-CM

## 2024-04-23 LAB
ALBUMIN SERPL BCP-MCNC: 3.8 G/DL (ref 3.5–5)
ALP SERPL-CCNC: 74 U/L (ref 34–104)
ALT SERPL W P-5'-P-CCNC: 12 U/L (ref 7–52)
ANION GAP SERPL CALCULATED.3IONS-SCNC: 9 MMOL/L (ref 4–13)
AST SERPL W P-5'-P-CCNC: 13 U/L (ref 13–39)
BASOPHILS # BLD AUTO: 0.02 THOUSANDS/ÂΜL (ref 0–0.1)
BASOPHILS NFR BLD AUTO: 0 % (ref 0–1)
BILIRUB SERPL-MCNC: 0.23 MG/DL (ref 0.2–1)
BUN SERPL-MCNC: 8 MG/DL (ref 5–25)
CALCIUM SERPL-MCNC: 8.5 MG/DL (ref 8.4–10.2)
CHLORIDE SERPL-SCNC: 107 MMOL/L (ref 96–108)
CHOLEST SERPL-MCNC: 162 MG/DL
CO2 SERPL-SCNC: 25 MMOL/L (ref 21–32)
CREAT SERPL-MCNC: 0.83 MG/DL (ref 0.6–1.3)
EOSINOPHIL # BLD AUTO: 0.48 THOUSAND/ÂΜL (ref 0–0.61)
EOSINOPHIL NFR BLD AUTO: 7 % (ref 0–6)
ERYTHROCYTE [DISTWIDTH] IN BLOOD BY AUTOMATED COUNT: 13.3 % (ref 11.6–15.1)
GFR SERPL CREATININE-BSD FRML MDRD: 91 ML/MIN/1.73SQ M
GLUCOSE P FAST SERPL-MCNC: 115 MG/DL (ref 65–99)
HCT VFR BLD AUTO: 37.1 % (ref 34.8–46.1)
HDLC SERPL-MCNC: 51 MG/DL
HGB BLD-MCNC: 11.6 G/DL (ref 11.5–15.4)
IMM GRANULOCYTES # BLD AUTO: 0.01 THOUSAND/UL (ref 0–0.2)
IMM GRANULOCYTES NFR BLD AUTO: 0 % (ref 0–2)
LDLC SERPL CALC-MCNC: 85 MG/DL (ref 0–100)
LYMPHOCYTES # BLD AUTO: 3.08 THOUSANDS/ÂΜL (ref 0.6–4.47)
LYMPHOCYTES NFR BLD AUTO: 42 % (ref 14–44)
MCH RBC QN AUTO: 27.4 PG (ref 26.8–34.3)
MCHC RBC AUTO-ENTMCNC: 31.3 G/DL (ref 31.4–37.4)
MCV RBC AUTO: 88 FL (ref 82–98)
MONOCYTES # BLD AUTO: 0.61 THOUSAND/ÂΜL (ref 0.17–1.22)
MONOCYTES NFR BLD AUTO: 9 % (ref 4–12)
NEUTROPHILS # BLD AUTO: 3 THOUSANDS/ÂΜL (ref 1.85–7.62)
NEUTS SEG NFR BLD AUTO: 42 % (ref 43–75)
NONHDLC SERPL-MCNC: 111 MG/DL
NRBC BLD AUTO-RTO: 0 /100 WBCS
PLATELET # BLD AUTO: 207 THOUSANDS/UL (ref 149–390)
PMV BLD AUTO: 11 FL (ref 8.9–12.7)
POTASSIUM SERPL-SCNC: 3.6 MMOL/L (ref 3.5–5.3)
PROT SERPL-MCNC: 6.9 G/DL (ref 6.4–8.4)
RBC # BLD AUTO: 4.24 MILLION/UL (ref 3.81–5.12)
SODIUM SERPL-SCNC: 141 MMOL/L (ref 135–147)
TRIGL SERPL-MCNC: 130 MG/DL
WBC # BLD AUTO: 7.2 THOUSAND/UL (ref 4.31–10.16)

## 2024-04-23 PROCEDURE — 80053 COMPREHEN METABOLIC PANEL: CPT

## 2024-04-23 PROCEDURE — 80061 LIPID PANEL: CPT

## 2024-04-23 PROCEDURE — 36415 COLL VENOUS BLD VENIPUNCTURE: CPT

## 2024-04-23 PROCEDURE — 85025 COMPLETE CBC W/AUTO DIFF WBC: CPT

## 2024-04-25 RX ORDER — CETIRIZINE HYDROCHLORIDE 5 MG/1
5 TABLET, CHEWABLE ORAL DAILY
COMMUNITY

## 2024-04-25 NOTE — PRE-PROCEDURE INSTRUCTIONS
Pre-Surgery Instructions:   Medication Instructions    albuterol (2.5 mg/3 mL) 0.083 % nebulizer solution Uses PRN- OK to take day of surgery    buPROPion (Wellbutrin XL) 150 mg 24 hr tablet Instructions provided by MD per guidelines, will def hold dos    cetirizine (ZyrTEC) 5 MG chewable tablet Take night before surgery    drospirenone-ethinyl estradiol (DAYTON) 3-0.02 MG per tablet Take night before surgery    EPINEPHrine (EPIPEN) 0.3 mg/0.3 mL SOAJ Uses PRN- OK to take day of surgery    methocarbamol (ROBAXIN) 500 mg tablet Uses PRN- OK to take day of surgery    naltrexone (REVIA) 50 mg tablet Instructions provided by MD per guidelines, will def hold dos    nystatin (MYCOSTATIN) powder Hold day of surgery.    VENTOLIN  (90 Base) MCG/ACT inhaler Uses PRN- OK to take day of surgery    Medication instructions for day surgery reviewed. Please use only a sip of water to take your instructed medications. Avoid all over the counter vitamins, supplements and NSAIDS for one week prior to surgery per anesthesia guidelines. Tylenol is ok to take as needed.     You will receive a call one business day prior to surgery with an arrival time and hospital directions. If your surgery is scheduled on a Monday, the hospital will be calling you on the Friday prior to your surgery. If you have not heard from anyone by 8pm, please call the hospital supervisor through the hospital  at [phone]. ([place] [phone] or [place] [phone]).    Do not eat or drink anything after midnight the night before your surgery, including candy, mints, lifesavers, or chewing gum. Do not drink alcohol 24hrs before your surgery. Try not to smoke at least 24hrs before your surgery.       Follow the pre surgery showering instructions as listed in the “My Surgical Experience Booklet” or otherwise provided by your surgeon's office. Do not use a blade to shave the surgical area 1 week before surgery. It is okay to use a clean electric  clippers up to 24 hours before surgery. Do not apply any lotions, creams, including makeup, cologne, deodorant, or perfumes after showering on the day of your surgery. Do not use dry shampoo, hair spray, hair gel, or any type of hair products.     No contact lenses, eye make-up, or artificial eyelashes. Remove nail polish, including gel polish, and any artificial, gel, or acrylic nails if possible. Remove all jewelry including rings and body piercing jewelry.     Wear causal clothing that is easy to take on and off. Consider your type of surgery.    Keep any valuables, jewelry, piercings at home. Please bring any specially ordered equipment (sling, braces) if indicated.    Arrange for a responsible person to drive you to and from the hospital on the day of your surgery. Please confirm the visitor policy for the day of your procedure when you receive your phone call with an arrival time.     Call the surgeon's office with any new illnesses, exposures, or additional questions prior to surgery.    Please reference your “My Surgical Experience Booklet” for additional information to prepare for your upcoming surgery.

## 2024-05-03 DIAGNOSIS — N62 MACROMASTIA: Primary | ICD-10-CM

## 2024-05-03 RX ORDER — GABAPENTIN 300 MG/1
300 CAPSULE ORAL 3 TIMES DAILY
Qty: 15 CAPSULE | Refills: 0 | Status: SHIPPED | OUTPATIENT
Start: 2024-05-03 | End: 2024-05-08

## 2024-05-03 RX ORDER — OXYCODONE HYDROCHLORIDE 5 MG/1
5 TABLET ORAL EVERY 6 HOURS PRN
Qty: 10 TABLET | Refills: 0 | Status: SHIPPED | OUTPATIENT
Start: 2024-05-03

## 2024-05-03 RX ORDER — IBUPROFEN 800 MG/1
800 TABLET ORAL EVERY 8 HOURS PRN
Qty: 15 TABLET | Refills: 0 | Status: SHIPPED | OUTPATIENT
Start: 2024-05-03

## 2024-05-03 RX ORDER — SENNOSIDES 8.6 MG
650 CAPSULE ORAL EVERY 6 HOURS
Qty: 20 TABLET | Refills: 0 | Status: SHIPPED | OUTPATIENT
Start: 2024-05-03 | End: 2024-05-08

## 2024-05-03 RX ORDER — TRAMADOL HYDROCHLORIDE 50 MG/1
50 TABLET ORAL EVERY 6 HOURS PRN
Qty: 20 TABLET | Refills: 0 | Status: SHIPPED | OUTPATIENT
Start: 2024-05-03

## 2024-05-06 ENCOUNTER — HOSPITAL ENCOUNTER (OUTPATIENT)
Facility: HOSPITAL | Age: 34
Setting detail: OUTPATIENT SURGERY
Discharge: HOME | End: 2024-05-06
Attending: STUDENT IN AN ORGANIZED HEALTH CARE EDUCATION/TRAINING PROGRAM | Admitting: STUDENT IN AN ORGANIZED HEALTH CARE EDUCATION/TRAINING PROGRAM
Payer: COMMERCIAL

## 2024-05-06 ENCOUNTER — ANESTHESIA EVENT (OUTPATIENT)
Dept: PERIOP | Facility: HOSPITAL | Age: 34
End: 2024-05-06
Payer: COMMERCIAL

## 2024-05-06 ENCOUNTER — ANESTHESIA (OUTPATIENT)
Dept: PERIOP | Facility: HOSPITAL | Age: 34
End: 2024-05-06
Payer: COMMERCIAL

## 2024-05-06 VITALS
OXYGEN SATURATION: 98 % | RESPIRATION RATE: 18 BRPM | HEART RATE: 79 BPM | TEMPERATURE: 97.7 F | DIASTOLIC BLOOD PRESSURE: 91 MMHG | SYSTOLIC BLOOD PRESSURE: 157 MMHG

## 2024-05-06 DIAGNOSIS — N62 MACROMASTIA: ICD-10-CM

## 2024-05-06 LAB
EXT PREGNANCY TEST URINE: NEGATIVE
EXT. CONTROL: NORMAL
GLUCOSE SERPL-MCNC: 158 MG/DL (ref 65–140)

## 2024-05-06 PROCEDURE — 19318 BREAST REDUCTION: CPT | Performed by: PHYSICIAN ASSISTANT

## 2024-05-06 PROCEDURE — 88305 TISSUE EXAM BY PATHOLOGIST: CPT | Performed by: PATHOLOGY

## 2024-05-06 PROCEDURE — 19318 BREAST REDUCTION: CPT | Performed by: STUDENT IN AN ORGANIZED HEALTH CARE EDUCATION/TRAINING PROGRAM

## 2024-05-06 PROCEDURE — 81025 URINE PREGNANCY TEST: CPT | Performed by: STUDENT IN AN ORGANIZED HEALTH CARE EDUCATION/TRAINING PROGRAM

## 2024-05-06 PROCEDURE — C9290 INJ, BUPIVACAINE LIPOSOME: HCPCS | Performed by: PHYSICIAN ASSISTANT

## 2024-05-06 PROCEDURE — 82948 REAGENT STRIP/BLOOD GLUCOSE: CPT

## 2024-05-06 RX ORDER — KETAMINE HCL IN NACL, ISO-OSM 100MG/10ML
SYRINGE (ML) INJECTION AS NEEDED
Status: DISCONTINUED | OUTPATIENT
Start: 2024-05-06 | End: 2024-05-06

## 2024-05-06 RX ORDER — ROCURONIUM BROMIDE 10 MG/ML
INJECTION, SOLUTION INTRAVENOUS AS NEEDED
Status: DISCONTINUED | OUTPATIENT
Start: 2024-05-06 | End: 2024-05-06

## 2024-05-06 RX ORDER — HYDROMORPHONE HCL/PF 1 MG/ML
SYRINGE (ML) INJECTION AS NEEDED
Status: DISCONTINUED | OUTPATIENT
Start: 2024-05-06 | End: 2024-05-06

## 2024-05-06 RX ORDER — NEOSTIGMINE METHYLSULFATE 1 MG/ML
INJECTION INTRAVENOUS AS NEEDED
Status: DISCONTINUED | OUTPATIENT
Start: 2024-05-06 | End: 2024-05-06

## 2024-05-06 RX ORDER — ACETAMINOPHEN 325 MG/1
975 TABLET ORAL ONCE
Status: COMPLETED | OUTPATIENT
Start: 2024-05-06 | End: 2024-05-06

## 2024-05-06 RX ORDER — CEFAZOLIN SODIUM 1 G/3ML
INJECTION, POWDER, FOR SOLUTION INTRAMUSCULAR; INTRAVENOUS AS NEEDED
Status: DISCONTINUED | OUTPATIENT
Start: 2024-05-06 | End: 2024-05-06

## 2024-05-06 RX ORDER — PROPOFOL 10 MG/ML
INJECTION, EMULSION INTRAVENOUS AS NEEDED
Status: DISCONTINUED | OUTPATIENT
Start: 2024-05-06 | End: 2024-05-06

## 2024-05-06 RX ORDER — OXYCODONE HYDROCHLORIDE 5 MG/1
5 TABLET ORAL ONCE AS NEEDED
Status: COMPLETED | OUTPATIENT
Start: 2024-05-06 | End: 2024-05-06

## 2024-05-06 RX ORDER — MIDAZOLAM HYDROCHLORIDE 2 MG/2ML
INJECTION, SOLUTION INTRAMUSCULAR; INTRAVENOUS AS NEEDED
Status: DISCONTINUED | OUTPATIENT
Start: 2024-05-06 | End: 2024-05-06

## 2024-05-06 RX ORDER — ONDANSETRON 2 MG/ML
INJECTION INTRAMUSCULAR; INTRAVENOUS AS NEEDED
Status: DISCONTINUED | OUTPATIENT
Start: 2024-05-06 | End: 2024-05-06

## 2024-05-06 RX ORDER — CEFAZOLIN SODIUM 2 G/50ML
2000 SOLUTION INTRAVENOUS ONCE
Status: COMPLETED | OUTPATIENT
Start: 2024-05-06 | End: 2024-05-06

## 2024-05-06 RX ORDER — FENTANYL CITRATE 50 UG/ML
INJECTION, SOLUTION INTRAMUSCULAR; INTRAVENOUS AS NEEDED
Status: DISCONTINUED | OUTPATIENT
Start: 2024-05-06 | End: 2024-05-06

## 2024-05-06 RX ORDER — SODIUM CHLORIDE, SODIUM LACTATE, POTASSIUM CHLORIDE, CALCIUM CHLORIDE 600; 310; 30; 20 MG/100ML; MG/100ML; MG/100ML; MG/100ML
125 INJECTION, SOLUTION INTRAVENOUS CONTINUOUS
Status: DISCONTINUED | OUTPATIENT
Start: 2024-05-06 | End: 2024-05-06 | Stop reason: HOSPADM

## 2024-05-06 RX ORDER — FENTANYL CITRATE/PF 50 MCG/ML
50 SYRINGE (ML) INJECTION
Status: DISCONTINUED | OUTPATIENT
Start: 2024-05-06 | End: 2024-05-06 | Stop reason: HOSPADM

## 2024-05-06 RX ORDER — HYDROMORPHONE HCL/PF 1 MG/ML
0.5 SYRINGE (ML) INJECTION
Status: DISCONTINUED | OUTPATIENT
Start: 2024-05-06 | End: 2024-05-06 | Stop reason: HOSPADM

## 2024-05-06 RX ORDER — DEXAMETHASONE SODIUM PHOSPHATE 10 MG/ML
INJECTION, SOLUTION INTRAMUSCULAR; INTRAVENOUS AS NEEDED
Status: DISCONTINUED | OUTPATIENT
Start: 2024-05-06 | End: 2024-05-06

## 2024-05-06 RX ORDER — PROPOFOL 10 MG/ML
INJECTION, EMULSION INTRAVENOUS CONTINUOUS PRN
Status: DISCONTINUED | OUTPATIENT
Start: 2024-05-06 | End: 2024-05-06

## 2024-05-06 RX ORDER — MAGNESIUM HYDROXIDE 1200 MG/15ML
LIQUID ORAL AS NEEDED
Status: DISCONTINUED | OUTPATIENT
Start: 2024-05-06 | End: 2024-05-06 | Stop reason: HOSPADM

## 2024-05-06 RX ORDER — LIDOCAINE HYDROCHLORIDE 10 MG/ML
INJECTION, SOLUTION EPIDURAL; INFILTRATION; INTRACAUDAL; PERINEURAL AS NEEDED
Status: DISCONTINUED | OUTPATIENT
Start: 2024-05-06 | End: 2024-05-06

## 2024-05-06 RX ORDER — GABAPENTIN 300 MG/1
300 CAPSULE ORAL ONCE
Status: COMPLETED | OUTPATIENT
Start: 2024-05-06 | End: 2024-05-06

## 2024-05-06 RX ORDER — GLYCOPYRROLATE 0.2 MG/ML
INJECTION INTRAMUSCULAR; INTRAVENOUS AS NEEDED
Status: DISCONTINUED | OUTPATIENT
Start: 2024-05-06 | End: 2024-05-06

## 2024-05-06 RX ADMIN — MIDAZOLAM HYDROCHLORIDE 2 MG: 1 INJECTION, SOLUTION INTRAMUSCULAR; INTRAVENOUS at 11:18

## 2024-05-06 RX ADMIN — HYDROMORPHONE HYDROCHLORIDE 0.5 MG: 1 INJECTION, SOLUTION INTRAMUSCULAR; INTRAVENOUS; SUBCUTANEOUS at 13:26

## 2024-05-06 RX ADMIN — FENTANYL CITRATE 50 MCG: 50 INJECTION, SOLUTION INTRAMUSCULAR; INTRAVENOUS at 11:41

## 2024-05-06 RX ADMIN — GLYCOPYRROLATE 0.4 MG: 0.2 INJECTION, SOLUTION INTRAMUSCULAR; INTRAVENOUS at 15:59

## 2024-05-06 RX ADMIN — CEFAZOLIN SODIUM 2000 MG: 2 SOLUTION INTRAVENOUS at 11:18

## 2024-05-06 RX ADMIN — PROPOFOL 200 MG: 10 INJECTION, EMULSION INTRAVENOUS at 11:22

## 2024-05-06 RX ADMIN — Medication 20 MG: at 12:20

## 2024-05-06 RX ADMIN — CEFAZOLIN 2000 MG: 1 INJECTION, POWDER, FOR SOLUTION INTRAMUSCULAR; INTRAVENOUS; PARENTERAL at 15:21

## 2024-05-06 RX ADMIN — LIDOCAINE HYDROCHLORIDE 50 MG: 10 INJECTION, SOLUTION EPIDURAL; INFILTRATION; INTRACAUDAL; PERINEURAL at 11:22

## 2024-05-06 RX ADMIN — ONDANSETRON 4 MG: 2 INJECTION INTRAMUSCULAR; INTRAVENOUS at 11:23

## 2024-05-06 RX ADMIN — HYDROMORPHONE HYDROCHLORIDE 0.5 MG: 1 INJECTION, SOLUTION INTRAMUSCULAR; INTRAVENOUS; SUBCUTANEOUS at 12:02

## 2024-05-06 RX ADMIN — OXYCODONE HYDROCHLORIDE 5 MG: 5 TABLET ORAL at 18:12

## 2024-05-06 RX ADMIN — ACETAMINOPHEN 975 MG: 325 TABLET ORAL at 09:15

## 2024-05-06 RX ADMIN — DEXAMETHASONE SODIUM PHOSPHATE 10 MG: 10 INJECTION, SOLUTION INTRAMUSCULAR; INTRAVENOUS at 11:23

## 2024-05-06 RX ADMIN — SODIUM CHLORIDE, SODIUM LACTATE, POTASSIUM CHLORIDE, AND CALCIUM CHLORIDE: .6; .31; .03; .02 INJECTION, SOLUTION INTRAVENOUS at 16:01

## 2024-05-06 RX ADMIN — Medication 30 MG: at 11:22

## 2024-05-06 RX ADMIN — NEOSTIGMINE METHYLSULFATE 3 MG: 1 INJECTION INTRAVENOUS at 15:59

## 2024-05-06 RX ADMIN — ROCURONIUM BROMIDE 50 MG: 10 INJECTION INTRAVENOUS at 11:23

## 2024-05-06 RX ADMIN — FENTANYL CITRATE 50 MCG: 50 INJECTION, SOLUTION INTRAMUSCULAR; INTRAVENOUS at 11:22

## 2024-05-06 RX ADMIN — SODIUM CHLORIDE, SODIUM LACTATE, POTASSIUM CHLORIDE, AND CALCIUM CHLORIDE: .6; .31; .03; .02 INJECTION, SOLUTION INTRAVENOUS at 10:44

## 2024-05-06 RX ADMIN — FENTANYL CITRATE 100 MCG: 50 INJECTION, SOLUTION INTRAMUSCULAR; INTRAVENOUS at 12:28

## 2024-05-06 RX ADMIN — GABAPENTIN 300 MG: 300 CAPSULE ORAL at 09:15

## 2024-05-06 RX ADMIN — FENTANYL CITRATE 50 MCG: 50 INJECTION, SOLUTION INTRAMUSCULAR; INTRAVENOUS at 17:05

## 2024-05-06 RX ADMIN — PROPOFOL 140 MCG/KG/MIN: 10 INJECTION, EMULSION INTRAVENOUS at 11:26

## 2024-05-06 NOTE — ANESTHESIA PREPROCEDURE EVALUATION
Procedure:  BILATERAL BREAST REDUCATION (Bilateral: Breast)    Relevant Problems   MUSCULOSKELETAL   (+) Chronic bilateral low back pain      NEURO/PSYCH   (+) Chronic bilateral low back pain      PULMONARY   (+) Asthma      Obstetrics/Gynecology   (+) Irregular menses   (+) Uterine fibroid in antepartum period      Other   (+) Iron deficiency   (+) Obesity (BMI 30.0-34.9)        Physical Exam    Airway    Mallampati score: II  TM Distance: <3 FB  Neck ROM: full     Dental   No notable dental hx     Cardiovascular  Cardiovascular exam normal    Pulmonary  Pulmonary exam normal     Other Findings  post-pubertal.      Anesthesia Plan  ASA Score- 2     Anesthesia Type- general with ASA Monitors.         Additional Monitors:     Airway Plan: ETT.           Plan Factors-Exercise tolerance (METS): >4 METS.    Chart reviewed.   Existing labs reviewed.     Patient is not a current smoker. Patient not instructed to abstain from smoking on day of procedure. Patient did not smoke on day of surgery.            Induction- intravenous.    Postoperative Plan-     Informed Consent- Anesthetic plan and risks discussed with patient.  I personally reviewed this patient with the CRNA. Discussed and agreed on the Anesthesia Plan with the CRNA..

## 2024-05-06 NOTE — OP NOTE
OPERATIVE REPORT  PATIENT NAME: Stephanie Moses    :  1990  MRN: [id number]  Pt Location:  OR ROOM 08    SURGERY DATE: 2024    Surgeons and Role:     * Fatuma Patrick DO - Primary     * Gabbie Myers PA-C - Assisting    Preop Diagnosis:  Macromastia [N62]  Back Pain    Post-Op Diagnosis Codes:     * Macromastia [N62]  Back Pain    Procedure(s):  Bilateral - BILATERAL BREAST REDUCTION    Specimen(s):  ID Type Source Tests Collected by Time Destination   1 : LEFT BREAST REDUCTION TISSUE Tissue Breast, Left TISSUE EXAM Fatuma Patrick, DO 2024 1157    2 : RIGHT BREAST REDUCTION TISSUE Tissue Breast, Right TISSUE EXAM Fatuma Patrick, DO 2024 1157        Estimated Blood Loss:   25 mL    Drains:  [REMOVED] Urethral Catheter Latex 16 Fr. (Removed)   Collection Container Standard drainage bag 24 1159   Number of days: 0       Anesthesia Type:   General/TIVA    Operative Indications:  35 yo female presents with symptomatic macromastia    Operative Findings:  Right Aguilar flap 40 cm2 with inferior pedicle reduction  Left Aguilar flap 50 cm2 with inferior pedicle reduction    Complications:   None    Procedure and Technique:  The patient was seen preoperatively.  The procedure, risks, benefits and alternatives were discussed.  Informed consent was obtained.  The patient was site marked preoperatively.  The patient was brought to the operating room where she was positioned supine with all of her pressure points appropriately padded.  Anesthesia commenced.     The patient was prepped and draped in usual sterile fashion.   A timeout was performed and verified.  The wise pattern markings were incised as well as a 38 mm areola cookie cutter markings on the left breast.  I de-epithelialized the inferior pedicle as well as a laterally extending aguilar flap.  This was then dissected down to pectoralis fascia.  The dissection was carried superiorly along the pectoralis  fascia to allow for mobilization of the pedicle more superiorly.  The medial and lateral pillars were thinned to create proper contour of the breast without undue tension on the closure.  The area was copiously irrigated and hemostasis was obtained.  A pec block was performed.  The aguilar flap was then rotated superiorly and medially and tacked to the periosteum using 2-0 PDS.  The shape and closure was tailor tacked and excess skin was excised sharply.   Closure commenced using 2-0 PDS for the fascia, 3-0 monocryl for the deep dermis and 4-0 PDS to approximate the skin.  The site for the NAC was measured and incised. The underlying NAC was brought to the surface without undue tension and was secured in placed using 3-0 monocryl and 5-0 monocryl.  The same procedure was performed on the right.  The nipple areola complexes appeared healthy and well perfused.         The incisions were cleansed and glue was placed.  Once this was dry, steristrips and a surgical bra were placed.       The instrument, sponge and needle count was correct an verified prior to completion of the case.     The patient emerged from anesthesia and was transferred to the recovery room in stable condition.      Patient Disposition:  PACU         SIGNATURE: aFtuma Patrick DO  DATE: May 6, 2024  TIME: 4:29 PM

## 2024-05-06 NOTE — NURSING NOTE
Pt is awake,alert,tolerated diet, assisted OOB to BR, voided. Written and verbal instructions given to pt, and her S/O, who verbalizes an understanding.

## 2024-05-06 NOTE — DISCHARGE INSTR - AVS FIRST PAGE
Surgery Date: 5/6/2024                Patient: Stephanie Moses  Surgeon: Dr. Patrick     Postoperative Instructions   Breast Reduction       Dressings:  [x] Skin glue was applied to your incision over absorbable sutures.  You may feel small pieces of suture at the ends of your incision.    [] Remove dressing the second morning following your surgery and bathe as directed.    [x] No dressings are required but you may cover the incision with gauze for comfort.  [x] Wear your surgical bra at all times except while showering.  [] Bolsters (yellow dressings) were applied over your nipple areola complex.  These MUST remain dry until seen in the office.  [] Other instructions:     Bathing:  [x] Shower 48 hours after surgery.  Allow soap and water to gently wash over the incision.  No scrubbing.  Gently pat dry and apply dressing as needed/instructed above.  [x] No submerging incision in bathtub, pool, hot tub and/or lake.  [] Bolster dressings (yellow dressings) over your nipple areola complex MUST remain dry until seen in the office.     Activity:  [x] No heavy lifting (> 10lbs).  [x] No strenuous exercise.  [x] Walking is mandatory daily.  [x] Sleeping may be more comfortable with your head elevated.     Diet and Medication:  [x] Resume diet as tolerated.  High protein diet is important for healing.  Remain well hydrated with water and minimize sodium intake.  [x] Resume preoperative medications.  [x] Pain medications as prescribed.  You may also begin to use ibuprofen 48 hours after surgery.  [] Finish all antibiotics as prescribed.  [x] Apply ice to area as needed for pain.  Do not place ice directly on skin.  Do not use heat.  [] Other instructions:      It is expected to have some bruising, swelling and mild oozing at the incision site and of the surrounding area.  If there is more than you expect, an enlarging area or you suspect an infection, please call the office.     Some patients may experience a low-grade  fever after surgery.  If it is above 100.4, please call the office.     If you do not have a postoperative office appointment scheduled, please call the office today and let the staff know you are to be seen in 7 days.     Please call [phone] with any questions, concerns or changes.

## 2024-05-06 NOTE — INTERVAL H&P NOTE
H&P reviewed. After examining the patient I find no changes in the patients condition since the H&P had been written.    Vitals:    05/06/24 0907   BP: 139/91   Pulse: 90   Resp: 16   Temp: (!) 97.3 °F (36.3 °C)   SpO2: 98%

## 2024-05-07 NOTE — ANESTHESIA POSTPROCEDURE EVALUATION
Post-Op Assessment Note    CV Status:  Stable  Pain Score: 0    Pain management: adequate       Mental Status:  Arousable and sleepy   Hydration Status:  Euvolemic   PONV Controlled:  Controlled   Airway Patency:  Patent     Post Op Vitals Reviewed: Yes    No anethesia notable event occurred.    Staff: CRNA               BP      Temp      Pulse     Resp      SpO2

## 2024-05-09 PROCEDURE — 88305 TISSUE EXAM BY PATHOLOGIST: CPT | Performed by: PATHOLOGY

## 2024-05-13 ENCOUNTER — OFFICE VISIT (OUTPATIENT)
Dept: PLASTIC SURGERY | Facility: CLINIC | Age: 34
End: 2024-05-13

## 2024-05-13 DIAGNOSIS — N62 MACROMASTIA: Primary | ICD-10-CM

## 2024-05-13 PROCEDURE — 99024 POSTOP FOLLOW-UP VISIT: CPT | Performed by: PHYSICIAN ASSISTANT

## 2024-05-13 RX ORDER — GABAPENTIN 300 MG/1
300 CAPSULE ORAL 3 TIMES DAILY
Qty: 15 CAPSULE | Refills: 0 | Status: SHIPPED | OUTPATIENT
Start: 2024-05-13 | End: 2024-05-18

## 2024-05-13 NOTE — PROGRESS NOTES
Assessment and Plan:  Stephanie Moses 34 y.o. female s/p bilateral breast reduction 5/6/24, presenting for first post op appointment    - steri strips fell off in shower. Glue removed in office today. Will have patient return next week for suture end removal  - will refill gabapentin as patient is having symptoms of nerve pain  - may switch to compressive sports bra  - continue low salt and compression  - demonstrated scar massage. May also start using aquaphor to incisions  - return in 1 week    Subjective:  Pain improving. She does have some back discomfort. She has pruritis to her incisions likely due to skin glue. She is swollen but continues to improve.     Objective:  NAD, AAOx3  Incision C/D/I, glue removed, bilateral swelling. Right nipple darker than left with moderate amount of pink tissue to inferior nipple, sensation intact to bilateral nipples, no s/s of seroma    Gabbie Myers PA-C

## 2024-05-14 ENCOUNTER — NURSE TRIAGE (OUTPATIENT)
Dept: OTHER | Facility: OTHER | Age: 34
End: 2024-05-14

## 2024-05-15 NOTE — TELEPHONE ENCOUNTER
"Reason for Disposition  • [1] Caller has URGENT question AND [2] triager unable to answer question    Answer Assessment - Initial Assessment Questions  1. SYMPTOM: \"What's the main symptom you're concerned about?\" (e.g., pain, fever, vomiting)      A lot of drainage of right breast-clear, orange/reddish color, changed the guaze 3 times since 1700    2. ONSET: \"When did S/S  start?\"      Earlier today     3. SURGERY: \"What surgery was performed?\"      B/L breast reduction     4. DATE of SURGERY: \"When was surgery performed?\"       5/6/24    5. ANESTHESIA: \" What type of anesthesia did you have?\" (e.g., general, spinal, epidural, local)      General     6. PAIN: \"Is there any pain?\" If Yes, ask: \"How bad is it?\"  (Scale 1-10; or mild, moderate, severe)      Denies    7. FEVER: \"Do you have a fever?\" If Yes, ask: \"What is your temperature, how was it measured, and when did it start?\"      Denies    8. VOMITING: \"Is there any vomiting?\" If yes, ask: \"How many times?\"      Denies    9. BLEEDING: \"Is there any bleeding?\" If Yes, ask: \"How much?\" and \"Where?\"      Yes, minimal     10. OTHER SYMPTOMS: \"Do you have any other symptoms?\" (e.g., drainage from wound, painful urination, constipation)        Denies    Protocols used: Post-Op Symptoms and Questions-[facility]    "

## 2024-05-15 NOTE — TELEPHONE ENCOUNTER
Regarding: Had surgery o 5/6 now experiencing a lot of drainage from right breast  ----- Message from Neeraj Aguirre sent at 5/14/2024 11:15 PM EDT -----  '' I had surgery on 5/6 and now I'm experiencing a lot of drainage from the right breast.''

## 2024-05-15 NOTE — TELEPHONE ENCOUNTER
On call provider notified of patients symptoms. On call provider stated to have patient continue changing guaze, the fluid should slow down, if the fluid becomes cloudy or purulent should call back. Patient notified and verbalized understanding and has no further questions.

## 2024-05-16 ENCOUNTER — HOSPITAL ENCOUNTER (EMERGENCY)
Facility: HOSPITAL | Age: 34
Discharge: HOME | End: 2024-05-16
Attending: EMERGENCY MEDICINE | Admitting: EMERGENCY MEDICINE
Payer: COMMERCIAL

## 2024-05-16 VITALS
HEART RATE: 91 BPM | SYSTOLIC BLOOD PRESSURE: 110 MMHG | TEMPERATURE: 98.4 F | RESPIRATION RATE: 12 BRPM | OXYGEN SATURATION: 100 % | DIASTOLIC BLOOD PRESSURE: 53 MMHG

## 2024-05-16 DIAGNOSIS — T81.89XA PROBLEM INVOLVING SURGICAL INCISION: Primary | ICD-10-CM

## 2024-05-16 DIAGNOSIS — N62 MACROMASTIA: Primary | ICD-10-CM

## 2024-05-16 LAB
BASOPHILS # BLD AUTO: 0.01 THOUSANDS/ÂΜL (ref 0–0.1)
BASOPHILS NFR BLD AUTO: 0 % (ref 0–1)
EOSINOPHIL # BLD AUTO: 0.04 THOUSAND/ÂΜL (ref 0–0.61)
EOSINOPHIL NFR BLD AUTO: 0 % (ref 0–6)
ERYTHROCYTE [DISTWIDTH] IN BLOOD BY AUTOMATED COUNT: 13.2 % (ref 11.6–15.1)
HCT VFR BLD AUTO: 29.9 % (ref 34.8–46.1)
HGB BLD-MCNC: 9.6 G/DL (ref 11.5–15.4)
IMM GRANULOCYTES # BLD AUTO: 0.1 THOUSAND/UL (ref 0–0.2)
IMM GRANULOCYTES NFR BLD AUTO: 1 % (ref 0–2)
LYMPHOCYTES # BLD AUTO: 2.09 THOUSANDS/ÂΜL (ref 0.6–4.47)
LYMPHOCYTES NFR BLD AUTO: 16 % (ref 14–44)
MCH RBC QN AUTO: 27.3 PG (ref 26.8–34.3)
MCHC RBC AUTO-ENTMCNC: 32.1 G/DL (ref 31.4–37.4)
MCV RBC AUTO: 85 FL (ref 82–98)
MONOCYTES # BLD AUTO: 0.73 THOUSAND/ÂΜL (ref 0.17–1.22)
MONOCYTES NFR BLD AUTO: 6 % (ref 4–12)
NEUTROPHILS # BLD AUTO: 9.98 THOUSANDS/ÂΜL (ref 1.85–7.62)
NEUTS SEG NFR BLD AUTO: 77 % (ref 43–75)
NRBC BLD AUTO-RTO: 0 /100 WBCS
PLATELET # BLD AUTO: 361 THOUSANDS/UL (ref 149–390)
PMV BLD AUTO: 10.2 FL (ref 8.9–12.7)
RBC # BLD AUTO: 3.52 MILLION/UL (ref 3.81–5.12)
WBC # BLD AUTO: 12.95 THOUSAND/UL (ref 4.31–10.16)

## 2024-05-16 PROCEDURE — 99284 EMERGENCY DEPT VISIT MOD MDM: CPT | Performed by: PHYSICIAN ASSISTANT

## 2024-05-16 PROCEDURE — 99283 EMERGENCY DEPT VISIT LOW MDM: CPT

## 2024-05-16 PROCEDURE — 85025 COMPLETE CBC W/AUTO DIFF WBC: CPT | Performed by: PHYSICIAN ASSISTANT

## 2024-05-16 PROCEDURE — 36415 COLL VENOUS BLD VENIPUNCTURE: CPT | Performed by: PHYSICIAN ASSISTANT

## 2024-05-16 RX ORDER — CEPHALEXIN 500 MG/1
500 CAPSULE ORAL EVERY 12 HOURS SCHEDULED
Qty: 14 CAPSULE | Refills: 0 | Status: SHIPPED | OUTPATIENT
Start: 2024-05-16 | End: 2024-05-23

## 2024-05-16 NOTE — DISCHARGE INSTRUCTIONS
Please return to the emergency department for worsening symptoms including chest pain, shortness of breath, dizziness, lightheadedness, fever greater than 103, severe pain, inability to walk, fainting episodes, etc..  Please follow-up with your family practice provider as soon as possible.  Follow-up outpatient with plastic surgery.  You may use Ace bandages or a tight fitting bra to help with swelling and pain.  With worsening bleeding, you may follow-up with plastic surgery or return to the emergency department for reevaluation.

## 2024-05-16 NOTE — ED PROVIDER NOTES
"History  Chief Complaint   Patient presents with    Post-op Problem     Had bilateral breast reduction on 5/6.  Has been having drainage from left breast incision but today was a lot more than normal.  She is worried she \"popped a suture\".  States drainage is orange in color     This is a 34-year-old female presenting to the emergency department today for bleeding to a surgical incision underneath her left breast.  The patient underwent a breast reduction on May 6, 2024 with Dr. Patrick.  The patient that she has had some serosanguineous drainage intermittently since the surgery and followed up recently in office to have the glue removed to her incisions.  The patient notes that earlier this morning she had an increased amount of bleeding which has stopped prior to coming to the emergency department.  She wants to have the wound checked.  She denies any chest pain or shortness of breath.  She has no dizziness, lightheadedness, or visual disturbances.  She denies any fevers or chills.  She denies any swelling to the left breast versus the right breast.  There is no purulent drainage from the incisions.  No other complaints at this time.      History provided by:  Patient   used: No    Wound Check   There has been no treatment since the wound repair. The maximum temperature noted was less than 100.4 F. There has been bloody discharge from the wound. There is no redness present. There is no swelling present. There is no pain present.       Prior to Admission Medications   Prescriptions Last Dose Informant Patient Reported? Taking?   EPINEPHrine (EPIPEN) 0.3 mg/0.3 mL SOAJ   Yes No   Sig: USE ASE DIRECTED BY MD NUNEZ  (90 Base) MCG/ACT inhaler   Yes No   Sig: as needed   albuterol (2.5 mg/3 mL) 0.083 % nebulizer solution   Yes No   Sig: as needed   buPROPion (Wellbutrin XL) 150 mg 24 hr tablet   No No   Sig: Take 1 tablet (150 mg total) by mouth daily   Patient taking differently: Take 150 " mg by mouth daily Every other day   cetirizine (ZyrTEC) 5 MG chewable tablet   Yes No   Sig: Chew 5 mg daily   chlorhexidine (PERIDEX) 0.12 % solution   Yes No   Sig: SWISH AND SPIT TAKE 15ML BY MOUTH FOR 30 SECONDS TWICE A DAY AS DIRECTED   Patient not taking: Reported on 2/19/2024   drospirenone-ethinyl estradiol (DAYTON) 3-0.02 MG per tablet   No No   Sig: Take 1 tablet by mouth daily   Patient taking differently: Take 1 tablet by mouth daily Every evening   gabapentin (Neurontin) 300 mg capsule   No No   Sig: Take 1 capsule (300 mg total) by mouth 3 (three) times a day for 5 days   gabapentin (Neurontin) 300 mg capsule   No No   Sig: Take 1 capsule (300 mg total) by mouth 3 (three) times a day for 5 days   ibuprofen (MOTRIN) 800 mg tablet   No No   Sig: Take 1 tablet (800 mg total) by mouth every 8 (eight) hours as needed for mild pain (DO NOT BEGIN UNTIL 48 HOURS AFTER SURGERY)   methocarbamol (ROBAXIN) 500 mg tablet   No No   Sig: TAKE 1 TABLET (500 MG TOTAL) BY MOUTH 2 (TWO) TIMES A DAY AS NEEDED FOR MUSCLE SPASMS   Patient taking differently: Take 500 mg by mouth as needed for muscle spasms   naltrexone (REVIA) 50 mg tablet   No No   Sig: Take 0.5 tablets (25 mg total) by mouth daily   nystatin (MYCOSTATIN) powder   No No   Sig: Apply topically 3 (three) times a day   Patient taking differently: Apply topically 3 (three) times a day As needed   oxyCODONE (Roxicodone) 5 immediate release tablet   No No   Sig: Take 1 tablet (5 mg total) by mouth every 6 (six) hours as needed for moderate pain Max Daily Amount: 20 mg   traMADol (Ultram) 50 mg tablet   No No   Sig: Take 1 tablet (50 mg total) by mouth every 6 (six) hours as needed for moderate pain      Facility-Administered Medications: None       Past Medical History:   Diagnosis Date    Allergic     Anemia     Anxiety     Asthma     Chronic pain     neck    Clotting disorder (HCC)     Depression     Diabetes mellitus (HCC)     Eating disorder     Enlarged  thyroid     Obesity     Uterine fibroid     Visual impairment        Past Surgical History:   Procedure Laterality Date    NO PAST SURGERIES      [place] BREAST REDUCTION Bilateral 5/6/2024    Procedure: BILATERAL BREAST REDUCATION;  Surgeon: Fatuma Patrick DO;  Location:  MAIN OR;  Service: Plastics    TOOTH EXTRACTION  2005       Family History   Problem Relation Age of Onset    No Known Problems Mother     Diabetes Father     Hypertension Father     Liver cancer Maternal Grandmother     Mental illness Maternal Grandmother     Hypertension Paternal Grandmother     Diabetes Paternal Grandfather     Asthma Paternal Aunt     Asthma Paternal Uncle      I have reviewed and agree with the history as documented.    E-Cigarette/Vaping    E-Cigarette Use Never User      E-Cigarette/Vaping Substances    Nicotine No     THC No     CBD No     Flavoring No     Other No     Unknown No      Social History     Tobacco Use    Smoking status: Former     Current packs/day: 0.50     Average packs/day: 0.5 packs/day for 10.0 years (5.0 ttl pk-yrs)     Types: Cigarettes    Smokeless tobacco: Never   Vaping Use    Vaping status: Never Used   Substance Use Topics    Alcohol use: Yes     Alcohol/week: 2.0 - 3.0 standard drinks of alcohol     Types: 2 - 3 Standard drinks or equivalent per week     Comment: SOCIAL     Drug use: Yes     Frequency: 6.0 times per week     Types: Marijuana     Comment: for pain mgt       Review of Systems   Constitutional:  Negative for appetite change, chills, diaphoresis, fatigue and fever.   Eyes:  Negative for visual disturbance.   Respiratory:  Negative for cough, chest tightness, shortness of breath and wheezing.    Cardiovascular:  Negative for chest pain, palpitations and leg swelling.   Gastrointestinal:  Negative for abdominal pain, constipation, diarrhea, nausea and vomiting.   Musculoskeletal:  Negative for neck pain and neck stiffness.   Skin:  Positive for wound. Negative for rash.    Neurological:  Negative for dizziness, seizures, syncope, weakness, light-headedness, numbness and headaches.   Psychiatric/Behavioral:  Negative for confusion.    All other systems reviewed and are negative.      Physical Exam  Physical Exam  Vitals and nursing note reviewed. Exam conducted with a chaperone present (Lesvia Nugent RN).   Constitutional:       General: She is not in acute distress.     Appearance: Normal appearance. She is normal weight. She is not ill-appearing, toxic-appearing or diaphoretic.   HENT:      Head: Normocephalic and atraumatic.      Nose: Nose normal. No congestion or rhinorrhea.      Mouth/Throat:      Mouth: Mucous membranes are moist.      Pharynx: No oropharyngeal exudate or posterior oropharyngeal erythema.   Eyes:      General: No scleral icterus.        Right eye: No discharge.         Left eye: No discharge.      Extraocular Movements: Extraocular movements intact.      Pupils: Pupils are equal, round, and reactive to light.   Cardiovascular:      Rate and Rhythm: Normal rate and regular rhythm.      Pulses: Normal pulses.      Heart sounds: Normal heart sounds. No murmur heard.     No friction rub. No gallop.   Pulmonary:      Effort: Pulmonary effort is normal. No respiratory distress.      Breath sounds: Normal breath sounds. No stridor. No wheezing, rhonchi or rales.      Comments: Surgical wound underneath the left breast is well-healing with a very small area of dehiscence laterally; no active bleeding noted; no swelling, purulent drainage, redness, or warmth to suspect abscess  Bilateral breasts are symmetric; no swelling to the bilateral breasts noted  Chest:      Chest wall: No tenderness.   Musculoskeletal:         General: Normal range of motion.      Cervical back: Normal range of motion. No tenderness.      Right lower leg: No edema.      Left lower leg: No edema.   Skin:     General: Skin is warm and dry.      Capillary Refill: Capillary refill takes less than  2 seconds.      Coloration: Skin is not jaundiced or pale.   Neurological:      General: No focal deficit present.      Mental Status: She is alert and oriented to person, place, and time. Mental status is at baseline.   Psychiatric:         Mood and Affect: Mood normal.         Behavior: Behavior normal.         Vital Signs  ED Triage Vitals [05/16/24 1041]   Temperature Pulse Respirations Blood Pressure SpO2   98.4 °F (36.9 °C) (!) 114 12 116/66 99 %      Temp Source Heart Rate Source Patient Position - Orthostatic VS BP Location FiO2 (%)   Oral Monitor Sitting Left arm --      Pain Score       3           Vitals:    05/16/24 1041 05/16/24 1149   BP: 116/66 110/53   Pulse: (!) 114 91   Patient Position - Orthostatic VS: Sitting Lying         Visual Acuity      ED Medications  Medications - No data to display    Diagnostic Studies  Results Reviewed       Procedure Component Value Units Date/Time    CBC and differential [[id number]]  (Abnormal) Collected: 05/16/24 1137    Lab Status: Final result Specimen: Blood from Arm, Left Updated: 05/16/24 1142     WBC 12.95 Thousand/uL      RBC 3.52 Million/uL      Hemoglobin 9.6 g/dL      Hematocrit 29.9 %      MCV 85 fL      MCH 27.3 pg      MCHC 32.1 g/dL      RDW 13.2 %      MPV 10.2 fL      Platelets 361 Thousands/uL      nRBC 0 /100 WBCs      Segmented % 77 %      Immature Grans % 1 %      Lymphocytes % 16 %      Monocytes % 6 %      Eosinophils Relative 0 %      Basophils Relative 0 %      Absolute Neutrophils 9.98 Thousands/µL      Absolute Immature Grans 0.10 Thousand/uL      Absolute Lymphocytes 2.09 Thousands/µL      Absolute Monocytes 0.73 Thousand/µL      Eosinophils Absolute 0.04 Thousand/µL      Basophils Absolute 0.01 Thousands/µL                    No orders to display              Procedures  Procedures         ED Course  ED Course as of 05/16/24 1250   Thu May 16, 2024   1100 TT Dr. Tobar, plastic surgery resident on call.   1153 Case discussed with Dr. Lr  that hemoglobin is 9.6 from 11.6.  Prior hemoglobin was drawn prior to breast reduction.  Tachycardia resolved while here in the emergency department. Dr. Tobar will discuss with attending Dr. Patrick.   1201 Dr. Tobar and Dr. Patrick with plastic surgery are okay with me discharging the patient for outpatient follow-up.  They recommend Ace bandages or tight fitting bra.  Will send patient home with Ace bandages and have the patient follow-up outpatient.  Dispo planning.  Tachycardia improved.                               SBIRT 22yo+      Flowsheet Row Most Recent Value   Initial Alcohol Screen: US AUDIT-C     1. How often do you have a drink containing alcohol? 0 Filed at: 05/16/2024 1047   2. How many drinks containing alcohol do you have on a typical day you are drinking?  0 Filed at: 05/16/2024 1047   3a. Male UNDER 65: How often do you have five or more drinks on one occasion? 0 Filed at: 05/16/2024 1047   3b. FEMALE Any Age, or MALE 65+: How often do you have 4 or more drinks on one occassion? 0 Filed at: 05/16/2024 1047   Audit-C Score 0 Filed at: 05/16/2024 1047   BLANCHE: How many times in the past year have you...    Used an illegal drug or used a prescription medication for non-medical reasons? Never Filed at: 05/16/2024 1047                      Medical Decision Making  34-year-old female presenting to the emergency department today for postoperative bleeding.  Had a breast reduction on May 6, 2024.  She has had intermittent drainage from the wound underneath her left breast but there was more bleeding this morning than she was used to so she came to the emergency department for an evaluation.  Her vital signs initially show tachycardia which resolved while here in the emergency department.  On physical examination, the patient's wound underneath her left breast is overall well-healing.  To the lateral aspect of the wound, there is a small area of dehiscence but there is no active bleeding.  There is no  "warmth, fluctuance, or drainage on examination.  Hemoglobin today is 9.6 from baseline 11.6 though 11.6 was prior to the patient's breast reduction.  Tachycardia resolved with rest while here in the ER.  Case was discussed with Dr. Tobar, plastic surgery resident on-call.  He discussed this with the patient's surgeon Dr. Patrick.  With the patient's permission, I sent Dr. Tobar pictures of the patient's breasts as he requested.  He notes that breasts look symmetric.  The patient is stable for outpatient follow-up.  He recommends either tight fitting bra or Ace bandages.  Patient was given Ace bandages while here in the emergency department for compression.  She is stable for discharge at this time.  Follow-up outpatient with plastic surgery.  Strict return precautions were given.  Recommend PCP follow-up as soon as possible. The patient and/or patient's proxy verify their understanding and agree to the plan at this time.  All questions answered to the patient and/or their proxy's satisfaction.  All labs reviewed and utilized in the medical decision making process (if labs were ordered).  Portions of the record may have been created with voice recognition software.  Occasional wrong word or \"sound a like\" substitutions may have occurred due to the inherent limitations of voice recognition software.  Read the chart carefully and recognize, using context, where substitutions have occurred.    I reviewed prior notes.  I reviewed prior labs.    Problems Addressed:  Problem involving surgical incision: undiagnosed new problem with uncertain prognosis    Amount and/or Complexity of Data Reviewed  External Data Reviewed: labs and notes.  Labs: ordered. Decision-making details documented in ED Course.  Discussion of management or test interpretation with external provider(s): Dr. Tobar - Plastic Surgery             Disposition  Final diagnoses:   Problem involving surgical incision     Time reflects when diagnosis was " documented in both MDM as applicable and the Disposition within this note       Time User Action Codes Description Comment    5/16/2024 12:02 PM Edilberto Cruz Add [T81.89XA] Problem involving surgical incision           ED Disposition       ED Disposition   Discharge    Condition   Stable    Date/Time   Thu May 16, 2024 1201    Comment   Stephanie Moses discharge to home/self care.                   Follow-up Information       Follow up With Specialties Details Why Contact Info Additional Information    Sophia Stallings MD Internal Medicine, Pediatrics Schedule an appointment as soon as possible for a visit   [address]  [address]  [facility] [address]  [phone]       [facility] Emergency Medicine Go to  If symptoms worsen [address] [id number] [phone] [facility], [address] [address]    Fatuma Patrick DO Plastic Surgery Call   [address] [facility]  [address]  [facility] [address]  [phone]               Discharge Medication List as of 5/16/2024 12:03 PM        CONTINUE these medications which have NOT CHANGED    Details   albuterol (2.5 mg/3 mL) 0.083 % nebulizer solution as needed, Starting Tue 2/20/2018, Historical Med      buPROPion (Wellbutrin XL) 150 mg 24 hr tablet Take 1 tablet (150 mg total) by mouth daily, Starting Wed 3/6/2024, Normal      cetirizine (ZyrTEC) 5 MG chewable tablet Chew 5 mg daily, Historical Med      chlorhexidine (PERIDEX) 0.12 % solution SWISH AND SPIT TAKE 15ML BY MOUTH FOR 30 SECONDS TWICE A DAY AS DIRECTED, Historical Med      drospirenone-ethinyl estradiol (DAYTON) 3-0.02 MG per tablet Take 1 tablet by mouth daily, Starting Fri 3/1/2024, Until Fri 1/31/2025, Normal      EPINEPHrine (EPIPEN) 0.3 mg/0.3 mL SOAJ USE ASE DIRECTED BY MD, Historical Med      gabapentin (Neurontin) 300 mg capsule Take 1 capsule (300 mg total) by mouth 3  (three) times a day for 5 days, Starting Mon 5/13/2024, Until Sat 5/18/2024, Normal      ibuprofen (MOTRIN) 800 mg tablet Take 1 tablet (800 mg total) by mouth every 8 (eight) hours as needed for mild pain (DO NOT BEGIN UNTIL 48 HOURS AFTER SURGERY), Starting Fri 5/3/2024, Normal      methocarbamol (ROBAXIN) 500 mg tablet TAKE 1 TABLET (500 MG TOTAL) BY MOUTH 2 (TWO) TIMES A DAY AS NEEDED FOR MUSCLE SPASMS, Starting Fri 1/12/2024, Normal      naltrexone (REVIA) 50 mg tablet Take 0.5 tablets (25 mg total) by mouth daily, Starting Wed 3/6/2024, Normal      nystatin (MYCOSTATIN) powder Apply topically 3 (three) times a day, Starting Thu 6/8/2023, Normal      oxyCODONE (Roxicodone) 5 immediate release tablet Take 1 tablet (5 mg total) by mouth every 6 (six) hours as needed for moderate pain Max Daily Amount: 20 mg, Starting Fri 5/3/2024, Normal      traMADol (Ultram) 50 mg tablet Take 1 tablet (50 mg total) by mouth every 6 (six) hours as needed for moderate pain, Starting Fri 5/3/2024, Normal      VENTOLIN  (90 Base) MCG/ACT inhaler as needed, Starting Fri 1/5/2018, Historical Med             No discharge procedures on file.    PDMP Review         Value Time User    PDMP Reviewed  Yes 7/10/2023  8:29 AM Efe Ortega MD            ED Provider  Electronically Signed by             Edilberto Cruz PA-C  05/16/24 [id number]       Edilberto Cruz PA-C  05/16/24 0168

## 2024-05-17 ENCOUNTER — OFFICE VISIT (OUTPATIENT)
Dept: PLASTIC SURGERY | Facility: CLINIC | Age: 34
End: 2024-05-17

## 2024-05-17 DIAGNOSIS — N62 MACROMASTIA: Primary | ICD-10-CM

## 2024-05-17 PROCEDURE — 99024 POSTOP FOLLOW-UP VISIT: CPT | Performed by: STUDENT IN AN ORGANIZED HEALTH CARE EDUCATION/TRAINING PROGRAM

## 2024-05-17 NOTE — PROGRESS NOTES
Patient seen and examined.  Denies any new drainage from left since yesterday after being seen in the ER.    Patient's right NAC with scabbing of darker pigment but pink, healthy epithelialized tissue below.  Showed patient this in the mirror.  No s/s of seroma or hematoma bilaterally.  Suture ends snipped.  ACE wrap placed.    I recommend aquaphor liberally and ACE wrap for compression.  RTC in 10-14 days or sooner should any issues arise.  Please contact sooner with any concerns or changes.    Fatuma Patrick, DO  Plastic and Reconstructive Surgery

## 2024-05-21 ENCOUNTER — PATIENT MESSAGE (OUTPATIENT)
Dept: PLASTIC SURGERY | Facility: CLINIC | Age: 34
End: 2024-05-21

## 2024-05-22 DIAGNOSIS — N62 MACROMASTIA: Primary | ICD-10-CM

## 2024-05-22 RX ORDER — GABAPENTIN 300 MG/1
300 CAPSULE ORAL 3 TIMES DAILY
Qty: 30 CAPSULE | Refills: 0 | Status: SHIPPED | OUTPATIENT
Start: 2024-05-22 | End: 2024-06-01

## 2024-05-24 DIAGNOSIS — N62 MACROMASTIA: Primary | ICD-10-CM

## 2024-05-24 RX ORDER — CEPHALEXIN 500 MG/1
500 CAPSULE ORAL EVERY 12 HOURS SCHEDULED
Qty: 14 CAPSULE | Refills: 0 | Status: SHIPPED | OUTPATIENT
Start: 2024-05-24 | End: 2024-05-31

## 2024-05-28 ENCOUNTER — OFFICE VISIT (OUTPATIENT)
Dept: PLASTIC SURGERY | Facility: CLINIC | Age: 34
End: 2024-05-28

## 2024-05-28 DIAGNOSIS — N62 MACROMASTIA: Primary | ICD-10-CM

## 2024-05-28 PROCEDURE — 99024 POSTOP FOLLOW-UP VISIT: CPT | Performed by: STUDENT IN AN ORGANIZED HEALTH CARE EDUCATION/TRAINING PROGRAM

## 2024-05-31 ENCOUNTER — OFFICE VISIT (OUTPATIENT)
Dept: PLASTIC SURGERY | Facility: CLINIC | Age: 34
End: 2024-05-31

## 2024-05-31 DIAGNOSIS — N62 MACROMASTIA: Primary | ICD-10-CM

## 2024-05-31 PROCEDURE — 99024 POSTOP FOLLOW-UP VISIT: CPT | Performed by: STUDENT IN AN ORGANIZED HEALTH CARE EDUCATION/TRAINING PROGRAM

## 2024-06-04 ENCOUNTER — TELEPHONE (OUTPATIENT)
Age: 34
End: 2024-06-04

## 2024-06-04 NOTE — TELEPHONE ENCOUNTER
Patient returning call/no phone encounter  She said someone's name Tamra  Transferred to Tamra Huff

## 2024-06-05 ENCOUNTER — OFFICE VISIT (OUTPATIENT)
Dept: PLASTIC SURGERY | Facility: CLINIC | Age: 34
End: 2024-06-05

## 2024-06-05 DIAGNOSIS — N62 MACROMASTIA: Primary | ICD-10-CM

## 2024-06-05 PROCEDURE — 99024 POSTOP FOLLOW-UP VISIT: CPT | Performed by: PHYSICIAN ASSISTANT

## 2024-06-05 NOTE — PROGRESS NOTES
Assessment and Plan:  Stephanie Moses 34 y.o. female s/p bilateral breast reduction 5/6/24, presenting for right breast concerns    - right NAC area open. Area debrided and approximated with Ethilon sutures. T point opening approximated as well. Small opening around nipple and T point remain  - continue compression and low salt diet  - calcium alginate covered with mepilex to open areas  - return next week for check  - call with any questions/concerns     Subjective:    Patient with increase in oily drainage over the last few days. Tissue internally appears of fat necrosis. Area debrided. No fevers chills. Continues with nipple sensation.     Objective:  NAD, AAOx3  Right breast NAC dehiscence, moderate amount of tissue debrided to superior nipple, no malodor, no erythema. Healthy granular tissue inferiorly. Nipple sensation intact. Superficial dehiscence to T point with healthy granular tissue  Left breast with mild opening to T point, NAC vascularity intact    Gabbie Myers PA-C

## 2024-06-07 NOTE — PROGRESS NOTES
Patient seen and examined.      Superior aspect of right NAC with necrosis which was debrided to health tissue.  Interestingly, the right nipple and inferior aspect of NAC are healthy and patient reports nipple sensation.  I placed a pusestring around the superior aspect to encourage the granulation tissue below to heal in a more expeditious fashion and minimize dressing changes for the patient.      Encouraged lateral scar massage and compression.  Will keep dressing in place until appt later this week.    Fatuma Patrick, DO  Plastic and Reconstructive Surgery

## 2024-06-10 ENCOUNTER — OFFICE VISIT (OUTPATIENT)
Dept: PLASTIC SURGERY | Facility: CLINIC | Age: 34
End: 2024-06-10

## 2024-06-10 DIAGNOSIS — N62 MACROMASTIA: Primary | ICD-10-CM

## 2024-06-10 PROCEDURE — 99024 POSTOP FOLLOW-UP VISIT: CPT | Performed by: PHYSICIAN ASSISTANT

## 2024-06-10 RX ORDER — GABAPENTIN 300 MG/1
300 CAPSULE ORAL 3 TIMES DAILY
Qty: 30 CAPSULE | Refills: 0 | Status: SHIPPED | OUTPATIENT
Start: 2024-06-10 | End: 2024-06-20

## 2024-06-17 ENCOUNTER — OFFICE VISIT (OUTPATIENT)
Dept: PLASTIC SURGERY | Facility: CLINIC | Age: 34
End: 2024-06-17

## 2024-06-17 ENCOUNTER — TELEPHONE (OUTPATIENT)
Dept: PLASTIC SURGERY | Facility: CLINIC | Age: 34
End: 2024-06-17

## 2024-06-17 DIAGNOSIS — N62 MACROMASTIA: Primary | ICD-10-CM

## 2024-06-17 PROCEDURE — 99024 POSTOP FOLLOW-UP VISIT: CPT | Performed by: PHYSICIAN ASSISTANT

## 2024-06-18 RX ORDER — METHYLPREDNISOLONE 4 MG/1
TABLET ORAL
Qty: 1 EACH | Refills: 0 | Status: SHIPPED | OUTPATIENT
Start: 2024-06-18

## 2024-06-18 NOTE — PROGRESS NOTES
Assessment and Plan:  Stephanie Moses 34 y.o. female s/p bilateral breast reduction 5/6/24, presenting for right breast concerns    Right NAC with moderate amount of fatty necrotic tissue to superior pole. Large amount debrided. Sutures remain to superior pole.   Area packed with betadine soaked gauze. Keep gauze in place until appointment on Wednesday. T point also covered with betadine gauze.   Will send medrol dose pack for swelling  Continue massage and compression    Subjective:    Patient noticed increased drainage over the weekend from right breast. Fatty necrotic tissue exposed around NAC. No fevers/chills. Remains swollen.      Objective:  NAD, AAOx3  Right breast NAC dehiscence, moderate amount of tissue debrided to superior nipple, moderate swelling. No erythema. Healthy granular tissue inferiorly. Nipple sensation intact. Superficial dehiscence to T point with healthy granular tissue  Left breast with mild opening to T point, NAC vascularity intact     Gabbie Myers PA-C

## 2024-06-18 NOTE — PROGRESS NOTES
Assessment and Plan:  Stephanie Moses 34 y.o. female s/p bilateral breast reduction 5/6/24, presenting for right breast concerns    Right NAC continues with drainage. Area debrided. Superior NAC sutured with ethilon sutures. Inferior NAC healing in with healthy granular tissue  Continue compression and low salt diet  Calcium alginate to open areas  Will refill gabapentin   Return next week. Call with questions/concerns    Subjective:    Nipple vascularity remains intact. Continues with oily drainage. No fevers/chills.      Objective:  NAD, AAOx3  Right breast NAC dehiscence, moderate amount of tissue debrided to superior nipple, no erythema. Healthy granular tissue inferiorly. Nipple sensation intact. Superficial dehiscence to T point with healthy granular tissue  Left breast with mild opening to T point, NAC vascularity intact     Gabbie Myers PA-C

## 2024-06-19 ENCOUNTER — OFFICE VISIT (OUTPATIENT)
Dept: PLASTIC SURGERY | Facility: CLINIC | Age: 34
End: 2024-06-19

## 2024-06-19 DIAGNOSIS — N62 MACROMASTIA: Primary | ICD-10-CM

## 2024-06-19 PROCEDURE — 99024 POSTOP FOLLOW-UP VISIT: CPT | Performed by: STUDENT IN AN ORGANIZED HEALTH CARE EDUCATION/TRAINING PROGRAM

## 2024-06-19 NOTE — PROGRESS NOTES
Patient seen and examined.  Denies systemic symptoms.      Dressing removed.  Minimal slough noted.  Healthy granulation tissue superiorly and adherent inferiorly with nipple intact.  Deeper granulation tissue superiorly encouraged together with 2-0 Vicryl.  T point superficial epidermolysis healing in with new skin growth apparent.  Left side c/d/I.      Dressing placed.  Will see back Friday as scheduled to be sure this continues to be healthy and improve.  Call sooner with any issues or concerns.    Fatuma Patrick, DO  Plastic and Reconstructive Surgery

## 2024-06-19 NOTE — PROGRESS NOTES
Patient seen and examined.  Denies fevers or systemic symptoms.  Pt does have smell of smoke but assured me that she was visiting her father who smokes and she does not use nicotine.  I appreciate her honesty and openness in this discussion.    Dressings removed.  Inferior aspect and nipple remain intact and with sensation present.  Superior aspect with improving granulation tissue and decreased drainage.  Sutures remain in place/intact.    Dressing replaced.    Demonstrated dressing changes and supplies provided.  Will call with any changes or concerns but otherwise will see next week to be sure progressing in proper direction.

## 2024-06-21 ENCOUNTER — OFFICE VISIT (OUTPATIENT)
Dept: PLASTIC SURGERY | Facility: CLINIC | Age: 34
End: 2024-06-21

## 2024-06-21 DIAGNOSIS — N62 MACROMASTIA: Primary | ICD-10-CM

## 2024-06-21 PROCEDURE — 99024 POSTOP FOLLOW-UP VISIT: CPT | Performed by: PHYSICIAN ASSISTANT

## 2024-06-21 NOTE — PROGRESS NOTES
Assessment and Plan:  Stephanie Moses 34 y.o. female s/p bilateral breast reduction 5/6/24, presenting for right breast concerns    - right breast NAC with minimal drainage since Wednesday.   - encourage aquaphor and gauze daily  - continue steroid  - return in 2 weeks for check     Subjective:    Drainage has decreased. Area continues to heal in. No fatty necrotic tissue exposed.      Objective:  NAD, AAOx3  Right breast with NAC continuing to heal in with granular tissue. Minimal drainage. Swelling improved. Nipple sensation intact. Superficial opening to T point with healthy granular tissue  Left breast with mild opening to T point, NAC vascularity intact     Gabbie Myers PA-C

## 2024-06-24 DIAGNOSIS — N62 MACROMASTIA: Primary | ICD-10-CM

## 2024-06-24 RX ORDER — GABAPENTIN 300 MG/1
300 CAPSULE ORAL 3 TIMES DAILY
Qty: 30 CAPSULE | Refills: 0 | Status: SHIPPED | OUTPATIENT
Start: 2024-06-24 | End: 2024-07-04

## 2024-07-10 ENCOUNTER — OFFICE VISIT (OUTPATIENT)
Dept: PLASTIC SURGERY | Facility: CLINIC | Age: 34
End: 2024-07-10

## 2024-07-10 DIAGNOSIS — N62 MACROMASTIA: Primary | ICD-10-CM

## 2024-07-10 PROCEDURE — 99024 POSTOP FOLLOW-UP VISIT: CPT | Performed by: PHYSICIAN ASSISTANT

## 2024-07-10 NOTE — PROGRESS NOTES
Assessment and Plan:  Stephanie Moses 34 y.o. female s/p bilateral breast reduction 5/6/24, presenting for incision check    - right breast NAC continues to heal in. Very shallow opening to upper NAC. Continue aquaphor. Cocoa butter ok to other incisions  - discussed returning in 6 weeks to discuss NAC revision with Dr. Patrick  - call if any questions/concerns prior     Subjective:    Right breast NAC continues to fill in. Drainage is minimal. No fevers/chills. Mild swelling but improving.      Objective:  NAD, AAOx3  Right breast with NAC continuing to heal in with granular tissue. Minimal drainage. Swelling improved. Nipple sensation intact. T point healed with moderate scarring  Left breast incisions C/D/I, NAC vascularity intact     Gabbie Myers PA-C

## 2024-07-19 DIAGNOSIS — E66.9 OBESITY (BMI 30.0-34.9): ICD-10-CM

## 2024-07-19 RX ORDER — BUPROPION HYDROCHLORIDE 150 MG/1
150 TABLET ORAL DAILY
Qty: 90 TABLET | Refills: 1 | Status: SHIPPED | OUTPATIENT
Start: 2024-07-19

## 2024-08-19 ENCOUNTER — OFFICE VISIT (OUTPATIENT)
Dept: PLASTIC SURGERY | Facility: CLINIC | Age: 34
End: 2024-08-19
Payer: COMMERCIAL

## 2024-08-19 DIAGNOSIS — N62 MACROMASTIA: Primary | ICD-10-CM

## 2024-08-19 PROCEDURE — 99213 OFFICE O/P EST LOW 20 MIN: CPT | Performed by: PHYSICIAN ASSISTANT

## 2024-08-19 RX ORDER — GABAPENTIN 300 MG/1
300 CAPSULE ORAL 3 TIMES DAILY
Qty: 30 CAPSULE | Refills: 0 | Status: SHIPPED | OUTPATIENT
Start: 2024-08-19 | End: 2024-08-29

## 2024-08-20 NOTE — PROGRESS NOTES
Assessment and Plan:  Stephanie Moses 34 y.o. female s/p bilateral breast reduction 5/6/24, presenting for scar check    - bilateral incisions healed.   - we discussed possible revision including right areola reconstruction with grafting from left areola complex. Also will removed small amount of excess skin to superior right nipple. Will plan for OR  - scheduling to reach out to patient  - will refill gabapentin  - call with any questions/concerns     Subjective:    Patient doing well. Continues with some nerve pain. She does have sensitivity to her right nipple. She would like her volume to be equal between her breasts. She does get irritation to excess upper right nipple skin.      Objective:  NAD, AAOx3  Bilateral incisions C/D/I, scars lightening. Small amount of excess skin to superior right nipple. Moderate sensitivity to right nipple. Left breast volume > right.      Gabbie Myers PA-C

## 2024-08-21 ENCOUNTER — OFFICE VISIT (OUTPATIENT)
Dept: FAMILY MEDICINE CLINIC | Facility: CLINIC | Age: 34
End: 2024-08-21
Payer: COMMERCIAL

## 2024-08-21 VITALS
OXYGEN SATURATION: 99 % | HEIGHT: 64 IN | HEART RATE: 92 BPM | RESPIRATION RATE: 16 BRPM | WEIGHT: 171 LBS | DIASTOLIC BLOOD PRESSURE: 80 MMHG | SYSTOLIC BLOOD PRESSURE: 110 MMHG | TEMPERATURE: 98.3 F | BODY MASS INDEX: 29.19 KG/M2

## 2024-08-21 DIAGNOSIS — E66.9 OBESITY (BMI 30.0-34.9): ICD-10-CM

## 2024-08-21 DIAGNOSIS — Z98.890 H/O REDUCTION MAMMOPLASTY: ICD-10-CM

## 2024-08-21 DIAGNOSIS — Z12.4 CERVICAL CANCER SCREENING: ICD-10-CM

## 2024-08-21 DIAGNOSIS — Z91.018 MULTIPLE FOOD ALLERGIES: ICD-10-CM

## 2024-08-21 DIAGNOSIS — Z00.00 ANNUAL PHYSICAL EXAM: Primary | ICD-10-CM

## 2024-08-21 DIAGNOSIS — E61.1 IRON DEFICIENCY: ICD-10-CM

## 2024-08-21 PROCEDURE — 99395 PREV VISIT EST AGE 18-39: CPT | Performed by: INTERNAL MEDICINE

## 2024-08-21 PROCEDURE — 99213 OFFICE O/P EST LOW 20 MIN: CPT | Performed by: INTERNAL MEDICINE

## 2024-08-21 NOTE — PROGRESS NOTES
Adult Annual Physical  Name: Stephanie Moses      : 1990      MRN: [id number]  Encounter Provider: Sophia Stallings MD  Encounter Date: 2024   Encounter department: [facility] MEDICINE    Assessment & Plan   1. Annual physical exam  2. H/O reduction mammoplasty  -     Ambulatory Referral to Plastic Surgery; Future  3. Obesity (BMI 30.0-34.9)  4. Iron deficiency  5. Multiple food allergies  -     Ambulatory Referral to Allergy; Future  6. Cervical cancer screening  Magno here for Annual Physical-she had reduction mammoplasty done 24 and the right breast ended up getting severely infected, had to be reopened and drained several times, and now she does not have an areola or nipple there really-a lot of scarring-she would like a second plastic surgery opinion before doing anything more surgical to the breast to fix it-was referred to Dr Cosby-already had labwork recently which I reviewed with her and is utd on cervical cancer screening  Immunizations and preventive care screenings were discussed with patient today. Appropriate education was printed on patient's after visit summary.    Counseling:  Alcohol/drug use: discussed moderation in alcohol intake, the recommendations for healthy alcohol use, and avoidance of illicit drug use.  Dental Health: discussed importance of regular tooth brushing, flossing, and dental visits.  Exercise: the importance of regular exercise/physical activity was discussed. Recommend exercise 3-5 times per week for at least 30 minutes.       Depression Screening and Follow-up Plan: Patient was screened for depression during today's encounter. They screened negative with a PHQ-2 score of 0.      History of Present Illness     Adult Annual Physical:  Patient presents for annual physical.     Diet and Physical Activity:  - Diet/Nutrition: well balanced diet.  - Exercise: walking.    Depression Screening:  - PHQ-2 Score: 0    General Health:  - Sleep:  sleeps well.    Review of Systems   Constitutional: Negative.    HENT: Negative.     Eyes: Negative.    Cardiovascular: Negative.    Gastrointestinal: Negative.    Skin:  Positive for wound.   Neurological: Negative.      Pertinent Medical History   Hx of breast reduction    Medical History Reviewed by provider this encounter:       Past Medical History   Past Medical History:   Diagnosis Date    Allergic     Anemia     Anxiety     Asthma     Chronic pain     neck    Clotting disorder (HCC)     Depression     Diabetes mellitus (HCC)     Eating disorder     Enlarged thyroid     Obesity     Uterine fibroid     Visual impairment      Past Surgical History:   Procedure Laterality Date    NO PAST SURGERIES      MI BREAST REDUCTION Bilateral 5/6/2024    Procedure: BILATERAL BREAST REDUCATION;  Surgeon: Fatuma Patrick DO;  Location:  MAIN OR;  Service: Plastics    TOOTH EXTRACTION  2005     Family History   Problem Relation Age of Onset    No Known Problems Mother     Diabetes Father     Hypertension Father     Liver cancer Maternal Grandmother     Mental illness Maternal Grandmother     Hypertension Paternal Grandmother     Diabetes Paternal Grandfather     Asthma Paternal Aunt     Asthma Paternal Uncle      Current Outpatient Medications on File Prior to Visit   Medication Sig Dispense Refill    albuterol (2.5 mg/3 mL) 0.083 % nebulizer solution as needed  0    buPROPion (WELLBUTRIN XL) 150 mg 24 hr tablet TAKE 1 TABLET BY MOUTH EVERY DAY 90 tablet 1    cetirizine (ZyrTEC) 5 MG chewable tablet Chew 5 mg daily      drospirenone-ethinyl estradiol (DAYTON) 3-0.02 MG per tablet Take 1 tablet by mouth daily (Patient taking differently: Take 1 tablet by mouth daily Every evening) 84 tablet 3    EPINEPHrine (EPIPEN) 0.3 mg/0.3 mL SOAJ USE ASE DIRECTED BY MD  0    gabapentin (Neurontin) 300 mg capsule Take 1 capsule (300 mg total) by mouth 3 (three) times a day for 10 days (Patient taking differently: Take 300 mg by mouth  2 (two) times a day) 30 capsule 0    ibuprofen (MOTRIN) 800 mg tablet Take 1 tablet (800 mg total) by mouth every 8 (eight) hours as needed for mild pain (DO NOT BEGIN UNTIL 48 HOURS AFTER SURGERY) 15 tablet 0    naltrexone (REVIA) 50 mg tablet Take 0.5 tablets (25 mg total) by mouth daily 45 tablet 0    VENTOLIN  (90 Base) MCG/ACT inhaler as needed  0    [DISCONTINUED] chlorhexidine (PERIDEX) 0.12 % solution SWISH AND SPIT TAKE 15ML BY MOUTH FOR 30 SECONDS TWICE A DAY AS DIRECTED (Patient not taking: Reported on 2/19/2024)      [DISCONTINUED] gabapentin (Neurontin) 300 mg capsule Take 1 capsule (300 mg total) by mouth 3 (three) times a day for 5 days (Patient not taking: Reported on 8/21/2024) 15 capsule 0    [DISCONTINUED] gabapentin (Neurontin) 300 mg capsule Take 1 capsule (300 mg total) by mouth 3 (three) times a day for 5 days (Patient not taking: Reported on 8/21/2024) 15 capsule 0    [DISCONTINUED] gabapentin (Neurontin) 300 mg capsule Take 1 capsule (300 mg total) by mouth 3 (three) times a day for 10 days (Patient not taking: Reported on 8/21/2024) 30 capsule 0    [DISCONTINUED] gabapentin (Neurontin) 300 mg capsule Take 1 capsule (300 mg total) by mouth 3 (three) times a day for 10 days (Patient not taking: Reported on 8/21/2024) 30 capsule 0    [DISCONTINUED] gabapentin (Neurontin) 300 mg capsule Take 1 capsule (300 mg total) by mouth 3 (three) times a day for 10 days (Patient not taking: Reported on 8/21/2024) 30 capsule 0    [DISCONTINUED] methocarbamol (ROBAXIN) 500 mg tablet TAKE 1 TABLET (500 MG TOTAL) BY MOUTH 2 (TWO) TIMES A DAY AS NEEDED FOR MUSCLE SPASMS (Patient not taking: Reported on 8/21/2024) 60 tablet 5    [DISCONTINUED] methylPREDNISolone 4 MG tablet therapy pack Use as directed on package (Patient not taking: Reported on 8/21/2024) 1 each 0    [DISCONTINUED] nystatin (MYCOSTATIN) powder Apply topically 3 (three) times a day (Patient not taking: Reported on 8/21/2024) 15 g 5     [DISCONTINUED] oxyCODONE (Roxicodone) 5 immediate release tablet Take 1 tablet (5 mg total) by mouth every 6 (six) hours as needed for moderate pain Max Daily Amount: 20 mg (Patient not taking: Reported on 8/21/2024) 10 tablet 0    [DISCONTINUED] traMADol (Ultram) 50 mg tablet Take 1 tablet (50 mg total) by mouth every 6 (six) hours as needed for moderate pain (Patient not taking: Reported on 8/21/2024) 20 tablet 0     No current facility-administered medications on file prior to visit.     Allergies   Allergen Reactions    Grape (Artificial) Flavor - Food Allergy Anaphylaxis, Hives, Itching and Shortness Of Breath    Pollen Extract       Current Outpatient Medications on File Prior to Visit   Medication Sig Dispense Refill    albuterol (2.5 mg/3 mL) 0.083 % nebulizer solution as needed  0    buPROPion (WELLBUTRIN XL) 150 mg 24 hr tablet TAKE 1 TABLET BY MOUTH EVERY DAY 90 tablet 1    cetirizine (ZyrTEC) 5 MG chewable tablet Chew 5 mg daily      drospirenone-ethinyl estradiol (DAYTON) 3-0.02 MG per tablet Take 1 tablet by mouth daily (Patient taking differently: Take 1 tablet by mouth daily Every evening) 84 tablet 3    EPINEPHrine (EPIPEN) 0.3 mg/0.3 mL SOAJ USE ASE DIRECTED BY MD  0    gabapentin (Neurontin) 300 mg capsule Take 1 capsule (300 mg total) by mouth 3 (three) times a day for 10 days (Patient taking differently: Take 300 mg by mouth 2 (two) times a day) 30 capsule 0    ibuprofen (MOTRIN) 800 mg tablet Take 1 tablet (800 mg total) by mouth every 8 (eight) hours as needed for mild pain (DO NOT BEGIN UNTIL 48 HOURS AFTER SURGERY) 15 tablet 0    naltrexone (REVIA) 50 mg tablet Take 0.5 tablets (25 mg total) by mouth daily 45 tablet 0    VENTOLIN  (90 Base) MCG/ACT inhaler as needed  0    [DISCONTINUED] chlorhexidine (PERIDEX) 0.12 % solution SWISH AND SPIT TAKE 15ML BY MOUTH FOR 30 SECONDS TWICE A DAY AS DIRECTED (Patient not taking: Reported on 2/19/2024)      [DISCONTINUED] gabapentin (Neurontin) 300  mg capsule Take 1 capsule (300 mg total) by mouth 3 (three) times a day for 5 days (Patient not taking: Reported on 8/21/2024) 15 capsule 0    [DISCONTINUED] gabapentin (Neurontin) 300 mg capsule Take 1 capsule (300 mg total) by mouth 3 (three) times a day for 5 days (Patient not taking: Reported on 8/21/2024) 15 capsule 0    [DISCONTINUED] gabapentin (Neurontin) 300 mg capsule Take 1 capsule (300 mg total) by mouth 3 (three) times a day for 10 days (Patient not taking: Reported on 8/21/2024) 30 capsule 0    [DISCONTINUED] gabapentin (Neurontin) 300 mg capsule Take 1 capsule (300 mg total) by mouth 3 (three) times a day for 10 days (Patient not taking: Reported on 8/21/2024) 30 capsule 0    [DISCONTINUED] gabapentin (Neurontin) 300 mg capsule Take 1 capsule (300 mg total) by mouth 3 (three) times a day for 10 days (Patient not taking: Reported on 8/21/2024) 30 capsule 0    [DISCONTINUED] methocarbamol (ROBAXIN) 500 mg tablet TAKE 1 TABLET (500 MG TOTAL) BY MOUTH 2 (TWO) TIMES A DAY AS NEEDED FOR MUSCLE SPASMS (Patient not taking: Reported on 8/21/2024) 60 tablet 5    [DISCONTINUED] methylPREDNISolone 4 MG tablet therapy pack Use as directed on package (Patient not taking: Reported on 8/21/2024) 1 each 0    [DISCONTINUED] nystatin (MYCOSTATIN) powder Apply topically 3 (three) times a day (Patient not taking: Reported on 8/21/2024) 15 g 5    [DISCONTINUED] oxyCODONE (Roxicodone) 5 immediate release tablet Take 1 tablet (5 mg total) by mouth every 6 (six) hours as needed for moderate pain Max Daily Amount: 20 mg (Patient not taking: Reported on 8/21/2024) 10 tablet 0    [DISCONTINUED] traMADol (Ultram) 50 mg tablet Take 1 tablet (50 mg total) by mouth every 6 (six) hours as needed for moderate pain (Patient not taking: Reported on 8/21/2024) 20 tablet 0     No current facility-administered medications on file prior to visit.        Objective     /80 (BP Location: Left arm, Patient Position: Sitting, Cuff Size:  "Standard)   Pulse 92   Temp 98.3 °F (36.8 °C) (Tympanic)   Resp 16   Ht 5' 3.5\" (1.613 m)   Wt 77.6 kg (171 lb)   SpO2 99%   BMI 29.82 kg/m²     Physical Exam  Constitutional:       Appearance: Normal appearance.   HENT:      Head: Normocephalic and atraumatic.      Right Ear: External ear normal.      Left Ear: External ear normal.      Nose: Nose normal.      Mouth/Throat:      Mouth: Mucous membranes are moist.   Eyes:      Extraocular Movements: Extraocular movements intact.   Cardiovascular:      Rate and Rhythm: Normal rate and regular rhythm.      Heart sounds: Normal heart sounds.   Pulmonary:      Effort: Pulmonary effort is normal.      Breath sounds: Normal breath sounds.   Musculoskeletal:      Cervical back: Normal range of motion and neck supple.   Skin:     Comments: A lot of scarring and erythema areola area right breast   Neurological:      General: No focal deficit present.      Mental Status: She is alert and oriented to person, place, and time.       Administrative Statements   I have spent a total time of 25 minutes in caring for this patient on the day of the visit/encounter including Instructions for management, Counseling / Coordination of care, and Reviewing / ordering tests, medicine, procedures  .  "

## 2024-08-28 DIAGNOSIS — N62 MACROMASTIA: Primary | ICD-10-CM

## 2024-08-28 RX ORDER — SENNOSIDES 8.6 MG
650 CAPSULE ORAL EVERY 6 HOURS PRN
Qty: 20 TABLET | Refills: 0 | Status: SHIPPED | OUTPATIENT
Start: 2024-08-28

## 2024-08-28 RX ORDER — IBUPROFEN 800 MG/1
800 TABLET, FILM COATED ORAL EVERY 8 HOURS PRN
Qty: 20 TABLET | Refills: 0 | Status: SHIPPED | OUTPATIENT
Start: 2024-08-28

## 2024-08-29 ENCOUNTER — TELEPHONE (OUTPATIENT)
Age: 34
End: 2024-08-29

## 2024-08-29 NOTE — TELEPHONE ENCOUNTER
FYI: Pt stated on 8/22/24 via [facility] PCP stated she would have physical form for her job completed for pt. Pt wanted an update. As per office staff pt was told it is still being worked on and she will get a call bk when it is complete.

## 2024-09-03 ENCOUNTER — TELEPHONE (OUTPATIENT)
Dept: PLASTIC SURGERY | Facility: CLINIC | Age: 34
End: 2024-09-03

## 2024-09-04 NOTE — TELEPHONE ENCOUNTER
Patient returning call to Anayeli about scheduling for surgery,    Unsure as to what surgery she meant, would like to discuss when Anayeli is available again

## 2024-09-18 ENCOUNTER — VBI (OUTPATIENT)
Dept: ADMINISTRATIVE | Facility: OTHER | Age: 34
End: 2024-09-18

## 2024-09-18 NOTE — TELEPHONE ENCOUNTER
09/18/24 1:01 PM     Chart reviewed for Diabetic Eye Exam ; nothing is submitted to the patient's insurance at this time.     Chanelle Guzman   [facility]

## 2024-09-22 DIAGNOSIS — N62 MACROMASTIA: ICD-10-CM

## 2024-09-23 RX ORDER — IBUPROFEN 800 MG/1
800 TABLET, FILM COATED ORAL EVERY 8 HOURS PRN
Qty: 20 TABLET | Refills: 0 | Status: SHIPPED | OUTPATIENT
Start: 2024-09-23

## 2024-09-23 RX ORDER — GABAPENTIN 300 MG/1
300 CAPSULE ORAL 2 TIMES DAILY
Qty: 60 CAPSULE | Refills: 1 | Status: SHIPPED | OUTPATIENT
Start: 2024-09-23

## 2024-09-23 RX ORDER — SENNOSIDES 8.6 MG
650 CAPSULE ORAL EVERY 6 HOURS PRN
Qty: 20 TABLET | Refills: 0 | Status: SHIPPED | OUTPATIENT
Start: 2024-09-23

## 2024-09-25 ENCOUNTER — PREP FOR PROCEDURE (OUTPATIENT)
Dept: PLASTIC SURGERY | Facility: CLINIC | Age: 34
End: 2024-09-25

## 2024-09-25 DIAGNOSIS — Z98.890 H/O BILATERAL BREAST REDUCTION SURGERY: Primary | ICD-10-CM

## 2024-10-19 ENCOUNTER — PATIENT MESSAGE (OUTPATIENT)
Dept: PLASTIC SURGERY | Facility: CLINIC | Age: 34
End: 2024-10-19

## 2024-10-21 ENCOUNTER — PATIENT MESSAGE (OUTPATIENT)
Dept: PLASTIC SURGERY | Facility: CLINIC | Age: 34
End: 2024-10-21

## 2024-10-21 DIAGNOSIS — N62 MACROMASTIA: ICD-10-CM

## 2024-10-22 RX ORDER — SENNOSIDES 8.6 MG
650 CAPSULE ORAL EVERY 6 HOURS PRN
Qty: 20 TABLET | Refills: 0 | Status: SHIPPED | OUTPATIENT
Start: 2024-10-22

## 2024-10-22 RX ORDER — IBUPROFEN 800 MG/1
800 TABLET, FILM COATED ORAL EVERY 8 HOURS PRN
Qty: 20 TABLET | Refills: 0 | Status: SHIPPED | OUTPATIENT
Start: 2024-10-22

## 2024-10-28 DIAGNOSIS — Z76.0 MEDICATION REFILL: Primary | ICD-10-CM

## 2024-10-29 RX ORDER — ALBUTEROL SULFATE 90 UG/1
1 AEROSOL, METERED RESPIRATORY (INHALATION) AS NEEDED
Qty: 18 G | Refills: 0 | Status: SHIPPED | OUTPATIENT
Start: 2024-10-29

## 2024-11-19 DIAGNOSIS — N62 MACROMASTIA: ICD-10-CM

## 2024-11-20 RX ORDER — IBUPROFEN 800 MG/1
800 TABLET, FILM COATED ORAL EVERY 8 HOURS PRN
Qty: 20 TABLET | Refills: 0 | Status: SHIPPED | OUTPATIENT
Start: 2024-11-20

## 2024-11-20 RX ORDER — SENNOSIDES 8.6 MG
650 CAPSULE ORAL EVERY 6 HOURS PRN
Qty: 20 TABLET | Refills: 0 | Status: SHIPPED | OUTPATIENT
Start: 2024-11-20

## 2024-11-21 DIAGNOSIS — Z76.0 MEDICATION REFILL: ICD-10-CM

## 2024-11-21 RX ORDER — ALBUTEROL SULFATE 90 UG/1
INHALANT RESPIRATORY (INHALATION)
Qty: 18 G | Refills: 3 | Status: SHIPPED | OUTPATIENT
Start: 2024-11-21

## 2024-11-26 ENCOUNTER — OFFICE VISIT (OUTPATIENT)
Age: 34
End: 2024-11-26
Payer: COMMERCIAL

## 2024-11-26 DIAGNOSIS — N62 MACROMASTIA: Primary | ICD-10-CM

## 2024-11-26 PROCEDURE — 99214 OFFICE O/P EST MOD 30 MIN: CPT | Performed by: STUDENT IN AN ORGANIZED HEALTH CARE EDUCATION/TRAINING PROGRAM

## 2024-11-26 NOTE — H&P (VIEW-ONLY)
Assessment and Plan:  Ms. Moses is a 34 y.o. female presenting for reconstruction of right areola    We discussed the procedure, risks, benefits, alternatives and postoperative instructions and expectations.  Informed consent obtained. Will also plan for a small amount of fat transfer to assist with contour/symmetry.  All patient's questions were answered and she voiced understanding.    History of Present Illness:   Ms. Moses is a 34 y.o. female presenting for reconstruction of right areola.        Review of Systems:  A 12 point ROS was performed and negative except per HPI.    Past Medical History:  Past Medical History:   Diagnosis Date    Allergic     Anemia     Anxiety     Asthma     Chronic pain     neck    Clotting disorder (HCC)     Depression     Diabetes mellitus (HCC)     Eating disorder     Enlarged thyroid     Obesity     Uterine fibroid     Visual impairment        Past Surgical History:  Past Surgical History:   Procedure Laterality Date    NO PAST SURGERIES      RI BREAST REDUCTION Bilateral 5/6/2024    Procedure: BILATERAL BREAST REDUCATION;  Surgeon: Fatmua Patrick DO;  Location:  MAIN OR;  Service: Plastics    TOOTH EXTRACTION  2005       Social History:  Social History     Tobacco Use    Smoking status: Former     Current packs/day: 0.50     Average packs/day: 0.5 packs/day for 10.0 years (5.0 ttl pk-yrs)     Types: Cigarettes    Smokeless tobacco: Never   Vaping Use    Vaping status: Never Used   Substance Use Topics    Alcohol use: Yes     Alcohol/week: 2.0 - 3.0 standard drinks of alcohol     Types: 2 - 3 Standard drinks or equivalent per week     Comment: SOCIAL     Drug use: Yes     Frequency: 6.0 times per week     Types: Marijuana     Comment: for pain mgt       Family History:  Family History   Problem Relation Age of Onset    No Known Problems Mother     Diabetes Father     Hypertension Father     Liver cancer Maternal Grandmother     Mental illness Maternal Grandmother      "Hypertension Paternal Grandmother     Diabetes Paternal Grandfather     Asthma Paternal Aunt     Asthma Paternal Uncle        Allergies:  Allergies   Allergen Reactions    Grape (Artificial) Flavor - Food Allergy Anaphylaxis, Hives, Itching and Shortness Of Breath    Pollen Extract        Medications:  Current Outpatient Medications on File Prior to Visit   Medication Sig Dispense Refill    acetaminophen (TYLENOL) 650 mg CR tablet TAKE 1 TABLET (650 MG TOTAL) BY MOUTH EVERY 6 (SIX) HOURS AS NEEDED FOR MILD PAIN 20 tablet 0    albuterol (2.5 mg/3 mL) 0.083 % nebulizer solution as needed  0    albuterol (PROVENTIL HFA,VENTOLIN HFA) 90 mcg/act inhaler INHALE 1 PUFF AS NEEDED FOR WHEEZING 18 g 3    buPROPion (WELLBUTRIN XL) 150 mg 24 hr tablet TAKE 1 TABLET BY MOUTH EVERY DAY 90 tablet 1    cetirizine (ZyrTEC) 5 MG chewable tablet Chew 5 mg daily      drospirenone-ethinyl estradiol (DAYTON) 3-0.02 MG per tablet Take 1 tablet by mouth daily (Patient taking differently: Take 1 tablet by mouth daily Every evening) 84 tablet 3    EPINEPHrine (EPIPEN) 0.3 mg/0.3 mL SOAJ USE ASE DIRECTED BY MD  0    gabapentin (NEURONTIN) 300 mg capsule Take 1 capsule (300 mg total) by mouth 2 (two) times a day 60 capsule 1    ibuprofen (MOTRIN) 800 mg tablet Take 1 tablet (800 mg total) by mouth every 8 (eight) hours as needed for mild pain (DO NOT BEGIN UNTIL 48 HOURS AFTER SURGERY) 15 tablet 0    ibuprofen (MOTRIN) 800 mg tablet TAKE 1 TABLET (800 MG TOTAL) BY MOUTH EVERY 8 (EIGHT) HOURS AS NEEDED FOR MILD PAIN 20 tablet 0    naltrexone (REVIA) 50 mg tablet Take 0.5 tablets (25 mg total) by mouth daily 45 tablet 0     No current facility-administered medications on file prior to visit.         Physical Examination:  There were no vitals taken for this visit.  Estimated body mass index is 29.82 kg/m² as calculated from the following:    Height as of 8/21/24: 5' 3.5\" (1.613 m).    Weight as of 8/21/24: 77.6 kg (171 lb).  General: NAD, well " appearing, AAOx3  HEENT: NCAT, EOMI, MMM, supple  Resp: Nonlabored  Heart: RRR  Abdomen: Soft, ND, NT  Extremities/MSK: no LE edema, no obvious deficits in ROM  Neuro: grossly intact with no obvious deficits  Skin: no obvious lesions or rashes  Breast: no palpable mass, no palpable axillary lymphadenopathy, well healed scar bilaterally with minimal scar fibrosis on the right, right nipple intact and with sensation and erectile function, left NAC enlarged      Fatuma Patrick, DO  Plastic and Reconstructive Surgery

## 2024-11-26 NOTE — PROGRESS NOTES
Assessment and Plan:  Ms. Moses is a 34 y.o. female presenting for reconstruction of right areola    We discussed the procedure, risks, benefits, alternatives and postoperative instructions and expectations.  Informed consent obtained. Will also plan for a small amount of fat transfer to assist with contour/symmetry.  All patient's questions were answered and she voiced understanding.    History of Present Illness:   Ms. Moses is a 34 y.o. female presenting for reconstruction of right areola.        Review of Systems:  A 12 point ROS was performed and negative except per HPI.    Past Medical History:  Past Medical History:   Diagnosis Date    Allergic     Anemia     Anxiety     Asthma     Chronic pain     neck    Clotting disorder (HCC)     Depression     Diabetes mellitus (HCC)     Eating disorder     Enlarged thyroid     Obesity     Uterine fibroid     Visual impairment        Past Surgical History:  Past Surgical History:   Procedure Laterality Date    NO PAST SURGERIES      GA BREAST REDUCTION Bilateral 5/6/2024    Procedure: BILATERAL BREAST REDUCATION;  Surgeon: Fatuma Patrick DO;  Location:  MAIN OR;  Service: Plastics    TOOTH EXTRACTION  2005       Social History:  Social History     Tobacco Use    Smoking status: Former     Current packs/day: 0.50     Average packs/day: 0.5 packs/day for 10.0 years (5.0 ttl pk-yrs)     Types: Cigarettes    Smokeless tobacco: Never   Vaping Use    Vaping status: Never Used   Substance Use Topics    Alcohol use: Yes     Alcohol/week: 2.0 - 3.0 standard drinks of alcohol     Types: 2 - 3 Standard drinks or equivalent per week     Comment: SOCIAL     Drug use: Yes     Frequency: 6.0 times per week     Types: Marijuana     Comment: for pain mgt       Family History:  Family History   Problem Relation Age of Onset    No Known Problems Mother     Diabetes Father     Hypertension Father     Liver cancer Maternal Grandmother     Mental illness Maternal Grandmother      "Hypertension Paternal Grandmother     Diabetes Paternal Grandfather     Asthma Paternal Aunt     Asthma Paternal Uncle        Allergies:  Allergies   Allergen Reactions    Grape (Artificial) Flavor - Food Allergy Anaphylaxis, Hives, Itching and Shortness Of Breath    Pollen Extract        Medications:  Current Outpatient Medications on File Prior to Visit   Medication Sig Dispense Refill    acetaminophen (TYLENOL) 650 mg CR tablet TAKE 1 TABLET (650 MG TOTAL) BY MOUTH EVERY 6 (SIX) HOURS AS NEEDED FOR MILD PAIN 20 tablet 0    albuterol (2.5 mg/3 mL) 0.083 % nebulizer solution as needed  0    albuterol (PROVENTIL HFA,VENTOLIN HFA) 90 mcg/act inhaler INHALE 1 PUFF AS NEEDED FOR WHEEZING 18 g 3    buPROPion (WELLBUTRIN XL) 150 mg 24 hr tablet TAKE 1 TABLET BY MOUTH EVERY DAY 90 tablet 1    cetirizine (ZyrTEC) 5 MG chewable tablet Chew 5 mg daily      drospirenone-ethinyl estradiol (DAYTON) 3-0.02 MG per tablet Take 1 tablet by mouth daily (Patient taking differently: Take 1 tablet by mouth daily Every evening) 84 tablet 3    EPINEPHrine (EPIPEN) 0.3 mg/0.3 mL SOAJ USE ASE DIRECTED BY MD  0    gabapentin (NEURONTIN) 300 mg capsule Take 1 capsule (300 mg total) by mouth 2 (two) times a day 60 capsule 1    ibuprofen (MOTRIN) 800 mg tablet Take 1 tablet (800 mg total) by mouth every 8 (eight) hours as needed for mild pain (DO NOT BEGIN UNTIL 48 HOURS AFTER SURGERY) 15 tablet 0    ibuprofen (MOTRIN) 800 mg tablet TAKE 1 TABLET (800 MG TOTAL) BY MOUTH EVERY 8 (EIGHT) HOURS AS NEEDED FOR MILD PAIN 20 tablet 0    naltrexone (REVIA) 50 mg tablet Take 0.5 tablets (25 mg total) by mouth daily 45 tablet 0     No current facility-administered medications on file prior to visit.         Physical Examination:  There were no vitals taken for this visit.  Estimated body mass index is 29.82 kg/m² as calculated from the following:    Height as of 8/21/24: 5' 3.5\" (1.613 m).    Weight as of 8/21/24: 77.6 kg (171 lb).  General: NAD, well " appearing, AAOx3  HEENT: NCAT, EOMI, MMM, supple  Resp: Nonlabored  Heart: RRR  Abdomen: Soft, ND, NT  Extremities/MSK: no LE edema, no obvious deficits in ROM  Neuro: grossly intact with no obvious deficits  Skin: no obvious lesions or rashes  Breast: no palpable mass, no palpable axillary lymphadenopathy, well healed scar bilaterally with minimal scar fibrosis on the right, right nipple intact and with sensation and erectile function, left NAC enlarged      Fatuma Patrick, DO  Plastic and Reconstructive Surgery

## 2024-12-03 ENCOUNTER — ANESTHESIA EVENT (OUTPATIENT)
Dept: PERIOP | Facility: AMBULARY SURGERY CENTER | Age: 34
End: 2024-12-03
Payer: COMMERCIAL

## 2024-12-06 ENCOUNTER — APPOINTMENT (OUTPATIENT)
Dept: LAB | Facility: HOSPITAL | Age: 34
End: 2024-12-06
Attending: STUDENT IN AN ORGANIZED HEALTH CARE EDUCATION/TRAINING PROGRAM
Payer: COMMERCIAL

## 2024-12-06 DIAGNOSIS — Z98.890 H/O BILATERAL BREAST REDUCTION SURGERY: ICD-10-CM

## 2024-12-06 LAB
ANION GAP SERPL CALCULATED.3IONS-SCNC: 8 MMOL/L (ref 4–13)
BASOPHILS # BLD AUTO: 0.03 THOUSANDS/ÂΜL (ref 0–0.1)
BASOPHILS NFR BLD AUTO: 0 % (ref 0–1)
BUN SERPL-MCNC: 12 MG/DL (ref 5–25)
CALCIUM SERPL-MCNC: 8.7 MG/DL (ref 8.4–10.2)
CHLORIDE SERPL-SCNC: 107 MMOL/L (ref 96–108)
CO2 SERPL-SCNC: 25 MMOL/L (ref 21–32)
CREAT SERPL-MCNC: 0.88 MG/DL (ref 0.6–1.3)
EOSINOPHIL # BLD AUTO: 0.37 THOUSAND/ÂΜL (ref 0–0.61)
EOSINOPHIL NFR BLD AUTO: 5 % (ref 0–6)
ERYTHROCYTE [DISTWIDTH] IN BLOOD BY AUTOMATED COUNT: 14.4 % (ref 11.6–15.1)
GFR SERPL CREATININE-BSD FRML MDRD: 85 ML/MIN/1.73SQ M
GLUCOSE P FAST SERPL-MCNC: 99 MG/DL (ref 65–99)
HCT VFR BLD AUTO: 33.6 % (ref 34.8–46.1)
HGB BLD-MCNC: 10.1 G/DL (ref 11.5–15.4)
IMM GRANULOCYTES # BLD AUTO: 0.01 THOUSAND/UL (ref 0–0.2)
IMM GRANULOCYTES NFR BLD AUTO: 0 % (ref 0–2)
LYMPHOCYTES # BLD AUTO: 3.1 THOUSANDS/ÂΜL (ref 0.6–4.47)
LYMPHOCYTES NFR BLD AUTO: 45 % (ref 14–44)
MCH RBC QN AUTO: 25 PG (ref 26.8–34.3)
MCHC RBC AUTO-ENTMCNC: 30.1 G/DL (ref 31.4–37.4)
MCV RBC AUTO: 83 FL (ref 82–98)
MONOCYTES # BLD AUTO: 0.51 THOUSAND/ÂΜL (ref 0.17–1.22)
MONOCYTES NFR BLD AUTO: 7 % (ref 4–12)
NEUTROPHILS # BLD AUTO: 2.99 THOUSANDS/ÂΜL (ref 1.85–7.62)
NEUTS SEG NFR BLD AUTO: 43 % (ref 43–75)
NRBC BLD AUTO-RTO: 0 /100 WBCS
PLATELET # BLD AUTO: 254 THOUSANDS/UL (ref 149–390)
PMV BLD AUTO: 10.5 FL (ref 8.9–12.7)
POTASSIUM SERPL-SCNC: 4.1 MMOL/L (ref 3.5–5.3)
RBC # BLD AUTO: 4.04 MILLION/UL (ref 3.81–5.12)
SODIUM SERPL-SCNC: 140 MMOL/L (ref 135–147)
WBC # BLD AUTO: 7.01 THOUSAND/UL (ref 4.31–10.16)

## 2024-12-06 PROCEDURE — 85025 COMPLETE CBC W/AUTO DIFF WBC: CPT

## 2024-12-06 PROCEDURE — 80048 BASIC METABOLIC PNL TOTAL CA: CPT

## 2024-12-06 PROCEDURE — 36415 COLL VENOUS BLD VENIPUNCTURE: CPT

## 2024-12-09 RX ORDER — AZELASTINE HYDROCHLORIDE 137 UG/1
SPRAY, METERED NASAL DAILY PRN
COMMUNITY
Start: 2024-11-24

## 2024-12-09 RX ORDER — LEVOCETIRIZINE DIHYDROCHLORIDE 5 MG/1
1 TABLET, FILM COATED ORAL EVERY EVENING
COMMUNITY
Start: 2024-11-09

## 2024-12-09 NOTE — PRE-PROCEDURE INSTRUCTIONS
Pre-Surgery Instructions:   Medication Instructions    acetaminophen (TYLENOL) 650 mg CR tablet Uses PRN- OK to take day of surgery    albuterol (PROVENTIL HFA,VENTOLIN HFA) 90 mcg/act inhaler Uses PRN- OK to take day of surgery    Azelastine HCl 137 MCG/SPRAY SOLN Uses PRN- OK to take day of surgery    buPROPion (WELLBUTRIN XL) 150 mg 24 hr tablet Take day of surgery.    drospirenone-ethinyl estradiol (DAYTON) 3-0.02 MG per tablet Take night before surgery    gabapentin (NEURONTIN) 300 mg capsule Take day of surgery.    ibuprofen (MOTRIN) 800 mg tablet Stop taking 3 days prior to surgery.    levocetirizine (XYZAL) 5 MG tablet Take night before surgery   Medication instructions for day surgery reviewed. Please use only a sip of water to take your instructed medications. Avoid all over the counter vitamins, supplements and NSAIDS for one week prior to surgery per anesthesia guidelines. Tylenol is ok to take as needed.     You will receive a call one business day prior to surgery with an arrival time and hospital directions. If your surgery is scheduled on a Monday, the hospital will be calling you on the Friday prior to your surgery. If you have not heard from anyone by 8pm, please call the hospital supervisor through the hospital  at [phone]. (Fred [phone] or [place] [phone]).    Do not eat or drink anything after midnight the night before your surgery, including candy, mints, lifesavers, or chewing gum. Do not drink alcohol 24hrs before your surgery. Try not to smoke at least 24hrs before your surgery.       Follow the pre surgery showering instructions as listed in the “My Surgical Experience Booklet” or otherwise provided by your surgeon's office. Do not use a blade to shave the surgical area 1 week before surgery. It is okay to use a clean electric clippers up to 24 hours before surgery. Do not apply any lotions, creams, including makeup, cologne, deodorant, or perfumes after showering  on the day of your surgery. Do not use dry shampoo, hair spray, hair gel, or any type of hair products.     No contact lenses, eye make-up, or artificial eyelashes. Remove nail polish, including gel polish, and any artificial, gel, or acrylic nails if possible. Remove all jewelry including rings and body piercing jewelry.     Wear causal clothing that is easy to take on and off. Consider your type of surgery.    Keep any valuables, jewelry, piercings at home. Please bring any specially ordered equipment (sling, braces) if indicated.    Arrange for a responsible person to drive you to and from the hospital on the day of your surgery. Please confirm the visitor policy for the day of your procedure when you receive your phone call with an arrival time.     Call the surgeon's office with any new illnesses, exposures, or additional questions prior to surgery.    Please reference your “My Surgical Experience Booklet” for additional information to prepare for your upcoming surgery.

## 2024-12-16 DIAGNOSIS — N62 MACROMASTIA: ICD-10-CM

## 2024-12-16 DIAGNOSIS — N62 MACROMASTIA: Primary | ICD-10-CM

## 2024-12-16 RX ORDER — ACETAMINOPHEN 500 MG
500 TABLET ORAL EVERY 6 HOURS
Qty: 20 TABLET | Refills: 0 | Status: SHIPPED | OUTPATIENT
Start: 2024-12-16 | End: 2024-12-21

## 2024-12-16 RX ORDER — OXYCODONE HYDROCHLORIDE 5 MG/1
5 TABLET ORAL EVERY 6 HOURS PRN
Qty: 10 TABLET | Refills: 0 | Status: SHIPPED | OUTPATIENT
Start: 2024-12-16

## 2024-12-16 RX ORDER — TRAMADOL HYDROCHLORIDE 50 MG/1
50 TABLET ORAL EVERY 6 HOURS PRN
Qty: 20 TABLET | Refills: 0 | OUTPATIENT
Start: 2024-12-16

## 2024-12-16 RX ORDER — TRAMADOL HYDROCHLORIDE 50 MG/1
50 TABLET ORAL EVERY 6 HOURS PRN
Qty: 10 TABLET | Refills: 0 | Status: SHIPPED | OUTPATIENT
Start: 2024-12-16 | End: 2024-12-17

## 2024-12-16 RX ORDER — IBUPROFEN 800 MG/1
800 TABLET, FILM COATED ORAL EVERY 8 HOURS SCHEDULED
Qty: 15 TABLET | Refills: 0 | Status: SHIPPED | OUTPATIENT
Start: 2024-12-16 | End: 2024-12-21

## 2024-12-16 RX ORDER — TRAMADOL HYDROCHLORIDE 50 MG/1
50 TABLET ORAL EVERY 6 HOURS PRN
Qty: 20 TABLET | Refills: 0 | Status: SHIPPED | OUTPATIENT
Start: 2024-12-16

## 2024-12-16 RX ORDER — GABAPENTIN 300 MG/1
300 CAPSULE ORAL 3 TIMES DAILY
Qty: 15 CAPSULE | Refills: 0 | Status: SHIPPED | OUTPATIENT
Start: 2024-12-16 | End: 2024-12-21

## 2024-12-16 RX ORDER — OXYCODONE HYDROCHLORIDE 5 MG/1
5 TABLET ORAL EVERY 6 HOURS PRN
Qty: 10 TABLET | Refills: 0 | OUTPATIENT
Start: 2024-12-16

## 2024-12-16 RX ORDER — OXYCODONE HYDROCHLORIDE 5 MG/1
5 TABLET ORAL EVERY 6 HOURS PRN
Qty: 10 TABLET | Refills: 0 | Status: SHIPPED | OUTPATIENT
Start: 2024-12-16 | End: 2024-12-17

## 2024-12-16 NOTE — TELEPHONE ENCOUNTER
Patient has surgery tomorrow morning and [facility] was unable to fill both her Oxycodone and Tramadol. She is asking that they be sent to the [facility] at [address] [place] [address] in [facility]. She was hoping that could be sent today so that she can collect them before her surgery.

## 2024-12-17 ENCOUNTER — HOSPITAL ENCOUNTER (OUTPATIENT)
Facility: AMBULARY SURGERY CENTER | Age: 34
Setting detail: OUTPATIENT SURGERY
Discharge: HOME | End: 2024-12-17
Attending: STUDENT IN AN ORGANIZED HEALTH CARE EDUCATION/TRAINING PROGRAM | Admitting: STUDENT IN AN ORGANIZED HEALTH CARE EDUCATION/TRAINING PROGRAM
Payer: COMMERCIAL

## 2024-12-17 ENCOUNTER — ANESTHESIA (OUTPATIENT)
Dept: PERIOP | Facility: AMBULARY SURGERY CENTER | Age: 34
End: 2024-12-17
Payer: COMMERCIAL

## 2024-12-17 VITALS
OXYGEN SATURATION: 98 % | DIASTOLIC BLOOD PRESSURE: 74 MMHG | RESPIRATION RATE: 17 BRPM | TEMPERATURE: 98 F | SYSTOLIC BLOOD PRESSURE: 133 MMHG | HEART RATE: 104 BPM | HEIGHT: 63 IN | BODY MASS INDEX: 30.48 KG/M2 | WEIGHT: 172 LBS

## 2024-12-17 DIAGNOSIS — Z98.890 H/O BILATERAL BREAST REDUCTION SURGERY: ICD-10-CM

## 2024-12-17 DIAGNOSIS — N62 MACROMASTIA: Primary | ICD-10-CM

## 2024-12-17 LAB
EXT PREGNANCY TEST URINE: NEGATIVE
EXT. CONTROL: NORMAL

## 2024-12-17 PROCEDURE — 81025 URINE PREGNANCY TEST: CPT | Performed by: STUDENT IN AN ORGANIZED HEALTH CARE EDUCATION/TRAINING PROGRAM

## 2024-12-17 PROCEDURE — 88302 TISSUE EXAM BY PATHOLOGIST: CPT | Performed by: PATHOLOGY

## 2024-12-17 RX ORDER — SODIUM CHLORIDE, SODIUM LACTATE, POTASSIUM CHLORIDE, AND CALCIUM CHLORIDE .6; .31; .03; .02 G/100ML; G/100ML; G/100ML; G/100ML
IRRIGANT IRRIGATION AS NEEDED
Status: DISCONTINUED | OUTPATIENT
Start: 2024-12-17 | End: 2024-12-17 | Stop reason: HOSPADM

## 2024-12-17 RX ORDER — METOCLOPRAMIDE HYDROCHLORIDE 5 MG/ML
10 INJECTION INTRAMUSCULAR; INTRAVENOUS ONCE AS NEEDED
Status: COMPLETED | OUTPATIENT
Start: 2024-12-17 | End: 2024-12-17

## 2024-12-17 RX ORDER — OXYCODONE HYDROCHLORIDE 5 MG/1
5 TABLET ORAL ONCE AS NEEDED
Refills: 0 | Status: DISCONTINUED | OUTPATIENT
Start: 2024-12-17 | End: 2024-12-17 | Stop reason: HOSPADM

## 2024-12-17 RX ORDER — SODIUM CHLORIDE, SODIUM LACTATE, POTASSIUM CHLORIDE, CALCIUM CHLORIDE 600; 310; 30; 20 MG/100ML; MG/100ML; MG/100ML; MG/100ML
INJECTION, SOLUTION INTRAVENOUS CONTINUOUS PRN
Status: DISCONTINUED | OUTPATIENT
Start: 2024-12-17 | End: 2024-12-17

## 2024-12-17 RX ORDER — CEPHALEXIN 500 MG/1
500 CAPSULE ORAL EVERY 12 HOURS SCHEDULED
Qty: 14 CAPSULE | Refills: 0 | Status: SHIPPED | OUTPATIENT
Start: 2024-12-17 | End: 2024-12-24

## 2024-12-17 RX ORDER — ONDANSETRON 2 MG/ML
INJECTION INTRAMUSCULAR; INTRAVENOUS AS NEEDED
Status: DISCONTINUED | OUTPATIENT
Start: 2024-12-17 | End: 2024-12-17

## 2024-12-17 RX ORDER — GLYCOPYRROLATE 0.2 MG/ML
INJECTION INTRAMUSCULAR; INTRAVENOUS AS NEEDED
Status: DISCONTINUED | OUTPATIENT
Start: 2024-12-17 | End: 2024-12-17

## 2024-12-17 RX ORDER — SCOLOPAMINE TRANSDERMAL SYSTEM 1 MG/1
1 PATCH, EXTENDED RELEASE TRANSDERMAL
Status: COMPLETED | OUTPATIENT
Start: 2024-12-17 | End: 2024-12-17

## 2024-12-17 RX ORDER — PROPOFOL 10 MG/ML
INJECTION, EMULSION INTRAVENOUS AS NEEDED
Status: DISCONTINUED | OUTPATIENT
Start: 2024-12-17 | End: 2024-12-17

## 2024-12-17 RX ORDER — ENOXAPARIN SODIUM 100 MG/ML
40 INJECTION SUBCUTANEOUS ONCE
Status: COMPLETED | OUTPATIENT
Start: 2024-12-17 | End: 2024-12-17

## 2024-12-17 RX ORDER — LIDOCAINE HYDROCHLORIDE AND EPINEPHRINE 10; 10 MG/ML; UG/ML
INJECTION, SOLUTION INFILTRATION; PERINEURAL AS NEEDED
Status: DISCONTINUED | OUTPATIENT
Start: 2024-12-17 | End: 2024-12-17 | Stop reason: HOSPADM

## 2024-12-17 RX ORDER — MAGNESIUM HYDROXIDE 1200 MG/15ML
LIQUID ORAL AS NEEDED
Status: DISCONTINUED | OUTPATIENT
Start: 2024-12-17 | End: 2024-12-17 | Stop reason: HOSPADM

## 2024-12-17 RX ORDER — CLINDAMYCIN PHOSPHATE 150 MG/ML
INJECTION, SOLUTION INTRAVENOUS AS NEEDED
Status: DISCONTINUED | OUTPATIENT
Start: 2024-12-17 | End: 2024-12-17 | Stop reason: HOSPADM

## 2024-12-17 RX ORDER — DEXAMETHASONE SODIUM PHOSPHATE 10 MG/ML
INJECTION, SOLUTION INTRAMUSCULAR; INTRAVENOUS AS NEEDED
Status: DISCONTINUED | OUTPATIENT
Start: 2024-12-17 | End: 2024-12-17

## 2024-12-17 RX ORDER — PROMETHAZINE HYDROCHLORIDE 25 MG/ML
25 INJECTION, SOLUTION INTRAMUSCULAR; INTRAVENOUS ONCE AS NEEDED
Status: DISCONTINUED | OUTPATIENT
Start: 2024-12-17 | End: 2024-12-17 | Stop reason: HOSPADM

## 2024-12-17 RX ORDER — CEFAZOLIN SODIUM 2 G/50ML
2000 SOLUTION INTRAVENOUS ONCE
Status: COMPLETED | OUTPATIENT
Start: 2024-12-17 | End: 2024-12-17

## 2024-12-17 RX ORDER — MINERAL OIL
OIL (ML) MISCELLANEOUS AS NEEDED
Status: DISCONTINUED | OUTPATIENT
Start: 2024-12-17 | End: 2024-12-17 | Stop reason: HOSPADM

## 2024-12-17 RX ORDER — FENTANYL CITRATE 50 UG/ML
INJECTION, SOLUTION INTRAMUSCULAR; INTRAVENOUS AS NEEDED
Status: DISCONTINUED | OUTPATIENT
Start: 2024-12-17 | End: 2024-12-17

## 2024-12-17 RX ORDER — MIDAZOLAM HYDROCHLORIDE 2 MG/2ML
INJECTION, SOLUTION INTRAMUSCULAR; INTRAVENOUS AS NEEDED
Status: DISCONTINUED | OUTPATIENT
Start: 2024-12-17 | End: 2024-12-17

## 2024-12-17 RX ORDER — HYDROMORPHONE HCL/PF 1 MG/ML
0.5 SYRINGE (ML) INJECTION
Status: DISCONTINUED | OUTPATIENT
Start: 2024-12-17 | End: 2024-12-17 | Stop reason: HOSPADM

## 2024-12-17 RX ORDER — LIDOCAINE HYDROCHLORIDE 10 MG/ML
INJECTION, SOLUTION EPIDURAL; INFILTRATION; INTRACAUDAL; PERINEURAL AS NEEDED
Status: DISCONTINUED | OUTPATIENT
Start: 2024-12-17 | End: 2024-12-17

## 2024-12-17 RX ORDER — HYDROMORPHONE HCL/PF 1 MG/ML
SYRINGE (ML) INJECTION AS NEEDED
Status: DISCONTINUED | OUTPATIENT
Start: 2024-12-17 | End: 2024-12-17

## 2024-12-17 RX ORDER — FENTANYL CITRATE/PF 50 MCG/ML
25 SYRINGE (ML) INJECTION
Status: DISCONTINUED | OUTPATIENT
Start: 2024-12-17 | End: 2024-12-17 | Stop reason: HOSPADM

## 2024-12-17 RX ADMIN — SCOPALAMINE 1 PATCH: 1 PATCH, EXTENDED RELEASE TRANSDERMAL at 11:41

## 2024-12-17 RX ADMIN — FENTANYL CITRATE 25 MCG: 50 INJECTION INTRAMUSCULAR; INTRAVENOUS at 11:20

## 2024-12-17 RX ADMIN — FENTANYL CITRATE 25 MCG: 50 INJECTION INTRAMUSCULAR; INTRAVENOUS at 14:42

## 2024-12-17 RX ADMIN — MIDAZOLAM 2 MG: 1 INJECTION INTRAMUSCULAR; INTRAVENOUS at 11:14

## 2024-12-17 RX ADMIN — SODIUM CHLORIDE, SODIUM LACTATE, POTASSIUM CHLORIDE, AND CALCIUM CHLORIDE: .6; .31; .03; .02 INJECTION, SOLUTION INTRAVENOUS at 11:20

## 2024-12-17 RX ADMIN — FENTANYL CITRATE 25 MCG: 50 INJECTION INTRAMUSCULAR; INTRAVENOUS at 14:16

## 2024-12-17 RX ADMIN — PROPOFOL 200 MG: 10 INJECTION, EMULSION INTRAVENOUS at 11:20

## 2024-12-17 RX ADMIN — CEFAZOLIN SODIUM 2000 MG: 2 SOLUTION INTRAVENOUS at 11:20

## 2024-12-17 RX ADMIN — FENTANYL CITRATE 25 MCG: 50 INJECTION INTRAMUSCULAR; INTRAVENOUS at 12:34

## 2024-12-17 RX ADMIN — METOCLOPRAMIDE 10 MG: 5 INJECTION, SOLUTION INTRAMUSCULAR; INTRAVENOUS at 14:03

## 2024-12-17 RX ADMIN — FENTANYL CITRATE 25 MCG: 50 INJECTION INTRAMUSCULAR; INTRAVENOUS at 14:26

## 2024-12-17 RX ADMIN — DEXAMETHASONE SODIUM PHOSPHATE 10 MG: 10 INJECTION INTRAMUSCULAR; INTRAVENOUS at 11:20

## 2024-12-17 RX ADMIN — LIDOCAINE HYDROCHLORIDE 50 MG: 10 INJECTION, SOLUTION EPIDURAL; INFILTRATION; INTRACAUDAL at 11:20

## 2024-12-17 RX ADMIN — FENTANYL CITRATE 50 MCG: 50 INJECTION INTRAMUSCULAR; INTRAVENOUS at 11:46

## 2024-12-17 RX ADMIN — HYDROMORPHONE HYDROCHLORIDE 0.5 MG: 1 INJECTION, SOLUTION INTRAMUSCULAR; INTRAVENOUS; SUBCUTANEOUS at 13:12

## 2024-12-17 RX ADMIN — ONDANSETRON 4 MG: 2 INJECTION, SOLUTION INTRAMUSCULAR; INTRAVENOUS at 11:47

## 2024-12-17 RX ADMIN — ENOXAPARIN SODIUM 40 MG: 40 INJECTION SUBCUTANEOUS at 11:54

## 2024-12-17 RX ADMIN — GLYCOPYRROLATE 0.2 MG: 0.2 INJECTION INTRAMUSCULAR; INTRAVENOUS at 11:14

## 2024-12-17 NOTE — DISCHARGE INSTR - AVS FIRST PAGE
Surgery Date: 12/17/2024                Patient: Stephanie Moses  Surgeon: Fatuma Patrick, DO     Postoperative Instructions   Breast Reconstruction     Dressings:  [] Skin glue was applied to your incision over absorbable sutures.  You may feel small pieces of suture at the ends of your incision.    [] Remove dressing the second morning following your surgery and bathe as directed.  Please leave tegaderm (clear dressing over your drains) and steristrips in place.  [] No dressings are required but you may cover the incision with gauze for comfort.  [] Wear your surgical bra at all times except while showering.  [] Strip and record your RAVINDER drain output as instructed.  Be sure to bring the output log to your follow up appointment.  [x] Other instructions: Yellow bolster to remain in place over right nipple/areola, steristrips to remain in place over left side and abdomen.  Please wear bra and binder at all times except when showering.     Bathing:  [x] Shower 48 hours after surgery.  Please keep bolster on right chest dry (may cover with tegaderm to shower).  [x] No submerging incision in bathtub, pool, hot tub and/or lake.     Activity:  [x] No heavy lifting (> 10lbs).  [x] No strenuous exercise.  [x] Walking is mandatory daily.  [x] Sleeping may be more comfortable with your head elevated.  [x] No driving until off pain medications and you have resumed full range of motion.     Diet and Medication:  [x] Resume diet as tolerated.  High protein diet is important for healing.  Remain well hydrated with water and minimize sodium intake.  [x] Resume preoperative medications.  [x] Pain medications as prescribed.  You may also begin to use ibuprofen 24 hours after surgery.  [x] Finish all antibiotics as prescribed.  [x] Apply ice to area as needed for pain.  Do not place ice directly on skin.  Do not use heat.  [] Other instructions:      It is expected to have some bruising, swelling and mild oozing at the  incision site and of the surrounding area.  If there is more than you expect, an enlarging area or you suspect an infection, please call the office.     Some patients may experience a low-grade fever after surgery.  If it is above 100.4, please call the office.     If you do not have a postoperative office appointment scheduled, please call the office today and let the staff know Dr. Patrick needs to see you in 7 days.     Please call [phone] with any questions, concerns or changes.

## 2024-12-17 NOTE — ANESTHESIA POSTPROCEDURE EVALUATION
Post-Op Assessment Note    CV Status:  Stable  Pain Score: 0    Pain management: adequate       Mental Status:  Alert and awake   Hydration Status:  Euvolemic   PONV Controlled:  Controlled   Airway Patency:  Patent     Post Op Vitals Reviewed: Yes    No anethesia notable event occurred.    Staff: Anesthesiologist, CRNA           Last Filed PACU Vitals:  Vitals Value Taken Time   Temp 97.8    Pulse 85    /77    Resp 16    SpO2 100        Modified Jojo:  No data recorded

## 2024-12-17 NOTE — OP NOTE
OPERATIVE REPORT  PATIENT NAME: Stephanie Moses    :  1990  MRN: [id number]  Pt Location: [facility] OR ROOM 06    SURGERY DATE: 2024    Surgeons and Role:     * Fatuma Patrick DO - Primary     * Mitzi Boudreaux PA-C - Assisting    Preop Diagnosis:  H/O bilateral breast reduction surgery [Z98.890]    Post-Op Diagnosis Codes:     * H/O bilateral breast reduction surgery [Z98.890]    Procedure(s):  Right - RIGHT AREOLA RECON WITH GRAFTING FROM LEFT AREOLA COMPLEX. FAT TRANSFER TO RIGHT CHEST SCAR    Specimen(s):  ID Type Source Tests Collected by Time Destination   1 : Right Breast Tissue/ Skin Tissue Breast, Right TISSUE EXAM Fatuma Alethea Patrick DO 2024 1155        Estimated Blood Loss:   25 ml    Drains:  * No LDAs found *    Anesthesia Type:   General/TIVA    Operative Indications:  33 yo female presents with areolar necrosis following breast reduction.  Interestingly she retained her right nipple with sensation and erectile function but had necrosis of a portion of her right areola.  Her left areola was enlarged and thus we decided to perform areolar reconstruction with grafting from her left areola.  Additionally, she has lost a significant amount of weight since her reduction as she is now more active without pain.  The combination of this with the scaring of the upper aspect of her right breast created a small size discrepancy.  We opted to address this concurrently with fat transfer.    Operative Findings:  12 cm2 full thickness skin graft from left areola to right areolar  100 cc of lipoaspirate transferred to right superior pole  Excision of right breast scar 8x4 cm  There was some excess graft which remained so this was placed in biologic storage here at the [facility].      Complications:   None    Procedure and Technique:  The patient was seen preoperatively.  The procedure, risks, benefits and alternatives were discussed.  Informed consent was obtained.  The patient was  site marked preoperatively.  The patient was brought to the operating room where she was positioned supine with all of her pressure points appropriately padded.  Anesthesia commenced.  A timeout was performed and verified.     The patient was prepped and draped in usual sterile fashion.  I first turned my attention to the abdomen.  A stab incision was made and tumescent solution was infiltrated.   After adequate time to set, a #4 mercedes cannula was used to perform suction assisted lipectomy.  Once lipoaspirate was obtained, the incision was closed using 4-0 monocryl. The lipoaspirate was decanted and purified using the revQeexo system.      I then turned my attention to the left NAC.  A 38mm cookie cutter was used to size the areola.  The remaining areola outside of this size was excised sharply/ de-epithelialized as a full thickness skin graft.  It was placed on the back table on a damp gauze.  Hemostasis was obtained.  This side was then closed using 3-0 vicryl for the deep dermis and 5-0 monocryl in half buried interrupted horizontal mattress sutures.    I turned my attention to the right side.  I de-epithelialized the area around the nipple to create a bed to accept the graft.  This allowed me to excise the scar burden from the old/healed necrotic area.  Hemostasis was obtained.  The graft was then applied to this area and adfixed using 5-0 monocryl.  There were 2 additional areas of scar on the superior pole and along the medial aspect of the horizontal incision which were excised sharply.  Hemostasis was obtained.  These were closed using 3-0 vicryl for the deep dermis and 5-0 monocryl to approximate the skin.  Before the medial incision was closed, I utilized this as my access point to infiltrate the fat to the superior pole of the breast.  The transferred fat was molded and smoothed.  A tie over bolster, made from xeroform, cotton balls and mineral oil and 2-0 silk.  The remainder of the  incisions were  cleansed and glue was applied. Once dry, the surgical bra and binder were placed.     The instrument, sponge and needle count was correct an verified prior to completion of the case.     The patient emerged from anesthesia and was transferred to the recovery room in stable condition.      Patient Disposition:  PACU           SIGNATURE: Fatumadesiree Singletonleigh DO Celina  DATE: December 17, 2024  TIME: 3:22 PM    No qualified plastic surgery resident was available for the case.  The PA-C provided assistance with retraction and suturing.

## 2024-12-17 NOTE — ANESTHESIA PREPROCEDURE EVALUATION
Procedure:  RIGHT AREOLA RECON WITH GRAFTING FROM LEFT AREOLA COMPLEX, POSSIBLE FAT TRANSFER TO RIGHT CHEST SCAR (Right: Breast)    Relevant Problems   MUSCULOSKELETAL   (+) Chronic bilateral low back pain      NEURO/PSYCH   (+) Chronic bilateral low back pain      PULMONARY   (+) Asthma      Other   (+) Obesity (BMI 30.0-34.9)        Physical Exam    Airway    Mallampati score: II  TM Distance: >3 FB  Neck ROM: full     Dental   No notable dental hx     Cardiovascular      Pulmonary      Other Findings  post-pubertal.      Anesthesia Plan  ASA Score- 2     Anesthesia Type- general with ASA Monitors.         Additional Monitors:     Airway Plan: LMA.           Plan Factors-Exercise tolerance (METS): >4 METS.    Chart reviewed.    Patient summary reviewed.    Patient is not a current smoker.      Obstructive sleep apnea risk education given perioperatively.        Induction- intravenous.    Postoperative Plan-     Perioperative Resuscitation Plan - Level 1 - Full Code.       Informed Consent- Anesthetic plan and risks discussed with patient.  I personally reviewed this patient with the CRNA. Discussed and agreed on the Anesthesia Plan with the CRNA..

## 2024-12-17 NOTE — INTERVAL H&P NOTE
H&P reviewed. After examining the patient I find no changes in the patients condition since the H&P had been written.    Vitals:    12/17/24 0956   BP: 113/58   Pulse: 81   Resp: 16   Temp: (!) 97.3 °F (36.3 °C)   SpO2: 100%

## 2024-12-17 NOTE — OR NURSING
Dr. Patrick Skin Graft 12/17/2024     Left Areolar Skin Graft collected at [facility] OR 12/17/2024 at 1346; Held in ASC Freezer & placed in freezer by Monica Orozco RN.

## 2024-12-17 NOTE — ANESTHESIA POSTPROCEDURE EVALUATION
Post-Op Assessment Note    CV Status:  Stable    Pain management: satisfactory to patient       Mental Status:  Awake and alert   Hydration Status:  Stable   PONV Controlled:  None   Airway Patency:  Patent     Post Op Vitals Reviewed: Yes    No anethesia notable event occurred.    Staff: Anesthesiologist           Last Filed PACU Vitals:  Vitals Value Taken Time   Temp 98 °F (36.7 °C) 12/17/24 1349   Pulse 101 12/17/24 1521   /61 12/17/24 1516   Resp 20 12/17/24 1521   SpO2 97 % 12/17/24 1521   Vitals shown include unfiled device data.    Modified Jojo:  Activity: 2 (12/17/2024  3:15 PM)  Respiration: 2 (12/17/2024  3:15 PM)  Circulation: 2 (12/17/2024  3:15 PM)  Consciousness: 1 (12/17/2024  3:15 PM)  Oxygen Saturation: 2 (12/17/2024  3:15 PM)  Modified Jojo Score: 9 (12/17/2024  3:15 PM)

## 2024-12-18 ENCOUNTER — PATIENT MESSAGE (OUTPATIENT)
Age: 34
End: 2024-12-18

## 2024-12-23 PROCEDURE — 88302 TISSUE EXAM BY PATHOLOGIST: CPT | Performed by: PATHOLOGY

## 2024-12-26 ENCOUNTER — OFFICE VISIT (OUTPATIENT)
Dept: PLASTIC SURGERY | Facility: CLINIC | Age: 34
End: 2024-12-26

## 2024-12-26 DIAGNOSIS — N62 MACROMASTIA: Primary | ICD-10-CM

## 2024-12-26 PROCEDURE — 99024 POSTOP FOLLOW-UP VISIT: CPT

## 2024-12-27 NOTE — PROGRESS NOTES
[facility] Plastic and Reconstructive Surgery  [address], [address], [place] [address]  [phone]    Patient Identification: Stephanie Moses is a 34 y.o. female     History of Present Illness: The patient is a 34 y.o.  year-old female  who presents to the office for post-op visit. Patient is 9 days s/p Right Areola Recon With Grafting From Left Areola Complex, Fat Transfer To Right Chest Scar - Right  on 12/17/2024 by Dr. Patrick.  Patient denies fevers, chills, signs of infection or pain. States she had some discomfort last night where the bolster is placed. She is wearing bra as instructed.   Patient has no complaints at this time.    Past Medical History:   Diagnosis Date    Allergic     Anemia     Anxiety     Asthma     with allergies/sickness    Chronic pain     neck    Depression     Diabetes mellitus (HCC)     pre-no medicine since 2022    Eating disorder     Enlarged thyroid     Obesity     Uterine fibroid     Visual impairment           Review of Systems  Constitutional: Denies fevers, chills or pain.  Skin: Denies any warmth, erythema, edema or mucopurulent drainage.     Physical Exam    Breast: Right nipple bolster removed. Nipple graft in tact, adherent and perfused. Left nipple incisions healing well. No signs of infection or dehiscence. See media.      Assessment and Plan:  The patient is an 34 y.o.  year-old female who presents to the office for post-op visit. Patient is 9 days s/p Right Areola Recon With Grafting From Left Areola Complex, Fat Transfer To Right Chest Scar - Right  on 12/17/2024 by Dr. Patrick      -At today's visit right nipple bolster removed, nipple graft viable and adherent. Pt may apply Aquaphor daily to the area. Protect with gauze to prevent shearing forces from bra.   -The patient is to return in 1-2 weeks for graft check  -The patient is to call the office with any questions or concerns. All of the patient's questions were answered at this time and they agree  with the plan of care.      Richelle Garcia PA-C  [facility] Plastic and Reconstructive Surgery

## 2025-01-10 ENCOUNTER — OFFICE VISIT (OUTPATIENT)
Age: 35
End: 2025-01-10

## 2025-01-10 DIAGNOSIS — N62 MACROMASTIA: Primary | ICD-10-CM

## 2025-01-10 PROCEDURE — 99024 POSTOP FOLLOW-UP VISIT: CPT | Performed by: STUDENT IN AN ORGANIZED HEALTH CARE EDUCATION/TRAINING PROGRAM

## 2025-01-13 NOTE — PROGRESS NOTES
Patient seen and examined.  Overall doing well.  Denies pain.    Remaining undissolved sutures snipped.  No palpable fat necrosis.  Incisions and graft healing well.    We discussed the nature of FTSG and this is healing well.  Continue moisturization.  RTC in 3 months or sooner should any issues arise.    Fatuma Patrick, DO  Plastic and Reconstructive Surgery

## 2025-01-14 DIAGNOSIS — N62 MACROMASTIA: ICD-10-CM

## 2025-01-14 RX ORDER — GABAPENTIN 300 MG/1
CAPSULE ORAL
Qty: 15 CAPSULE | Refills: 0 | Status: SHIPPED | OUTPATIENT
Start: 2025-01-14

## 2025-01-14 RX ORDER — PSEUDOEPHED/ACETAMINOPH/DIPHEN 30MG-500MG
TABLET ORAL
Qty: 20 TABLET | Refills: 0 | Status: SHIPPED | OUTPATIENT
Start: 2025-01-14

## 2025-01-14 RX ORDER — IBUPROFEN 800 MG/1
TABLET, FILM COATED ORAL
Qty: 15 TABLET | Refills: 0 | Status: SHIPPED | OUTPATIENT
Start: 2025-01-14

## 2025-02-05 DIAGNOSIS — Z30.011 ENCOUNTER FOR INITIAL PRESCRIPTION OF CONTRACEPTIVE PILLS: ICD-10-CM

## 2025-02-05 RX ORDER — DROSPIRENONE AND ETHINYL ESTRADIOL 0.02-3(28)
1 KIT ORAL DAILY
Qty: 28 TABLET | Refills: 0 | Status: SHIPPED | OUTPATIENT
Start: 2025-02-05

## 2025-03-04 DIAGNOSIS — Z30.011 ENCOUNTER FOR INITIAL PRESCRIPTION OF CONTRACEPTIVE PILLS: ICD-10-CM

## 2025-03-04 RX ORDER — DROSPIRENONE AND ETHINYL ESTRADIOL 0.02-3(28)
1 KIT ORAL DAILY
Qty: 28 TABLET | Refills: 0 | Status: SHIPPED | OUTPATIENT
Start: 2025-03-04

## 2025-04-02 DIAGNOSIS — Z30.011 ENCOUNTER FOR INITIAL PRESCRIPTION OF CONTRACEPTIVE PILLS: ICD-10-CM

## 2025-04-02 RX ORDER — DROSPIRENONE AND ETHINYL ESTRADIOL 0.02-3(28)
1 KIT ORAL DAILY
Qty: 28 TABLET | Refills: 0 | Status: SHIPPED | OUTPATIENT
Start: 2025-04-02 | End: 2025-04-07

## 2025-04-04 NOTE — PROGRESS NOTES
Subjective      Stephanie Moses is a 35 y.o. female who presents for annual GYN exam.     GYN:  Menses are irregular, with skipped months, lasting 7-prolonged (month) days. Denies intermenstrual bleeding, or spotting.   Hx of uterine fibroid.  Denies vaginal discharge. She reports vulvar irritation.   Contraception: LAVELLE.  Patient is sexually active with  partner.      OB:  OB History    Para Term  AB Living   2 1 1  1 1   SAB IAB Ectopic Multiple Live Births   1    1      # Outcome Date GA Lbr Zay/2nd Weight Sex Type Anes PTL Lv   2 Term 06/16/15 41w0d   M Vag-Spont   KRUNAL   1 SAB               Obstetric Comments   MENARCHE- AGE 11         :  Denies dysuria, urinary frequency or urgency.  Denies hematuria, flank pain, incontinence.    Breast:  Breast reduction and revision in May and 2024.  Denies breast mass, skin changes, dimpling, reddening, nipple retraction.  denies breast discharge.  Patient does  have a family history of breast, endometrial, colon, or ovarian ca.     Cancer-related family history includes Liver cancer in her maternal grandmother.    Past Medical History:   Diagnosis Date    Allergic     Anemia     Anxiety     Asthma     with allergies/sickness    Chronic pain     neck    Depression     Diabetes mellitus (HCC)     pre-no medicine since     Eating disorder     Enlarged thyroid     Obesity     Uterine fibroid     Visual impairment        Past Surgical History:   Procedure Laterality Date    GA BREAST REDUCTION Bilateral 2024    Procedure: BILATERAL BREAST REDUCATION;  Surgeon: Fatuma Patrick DO;  Location:  MAIN OR;  Service: Plastics    GA GRAFTING OF AUTOLOGOUS FAT BY LIPO 50 CC OR LESS Right 2024    Procedure: RIGHT AREOLA RECON WITH GRAFTING FROM LEFT AREOLA COMPLEX, FAT TRANSFER TO RIGHT CHEST SCAR;  Surgeon: Fatuma Patrick DO;  Location: [facility] MAIN OR;  Service: Plastics    TOOTH EXTRACTION  2005    wisdom  "        General:  BMI: 31  Safety: feels safe at home    Social History     Tobacco Use    Smoking status: Former     Current packs/day: 0.00     Average packs/day: 0.5 packs/day for 10.0 years (5.0 ttl pk-yrs)     Types: Cigarettes     Start date:      Quit date:      Years since quittin.2    Smokeless tobacco: Never   Vaping Use    Vaping status: Never Used   Substance Use Topics    Alcohol use: Yes     Alcohol/week: 2.0 - 3.0 standard drinks of alcohol     Types: 2 - 3 Standard drinks or equivalent per week     Comment: SOCIAL     Drug use: Yes     Frequency: 6.0 times per week     Types: Marijuana     Comment: for pain mgt-smoking/edibles       Screening:  Cervical cancer: last pap smear in 2023. Results were NILM/HPV-.   Breast cancer: last mammogram in Not on file.   Colon cancer: last colonoscopy in Not on file   STD screening: n/a.    Review of Systems   Constitutional:  Negative for fatigue.   Eyes:  Negative for photophobia and visual disturbance.   Respiratory:  Negative for cough and shortness of breath.    Cardiovascular:  Negative for chest pain and palpitations.   Gastrointestinal:  Negative for abdominal pain, blood in stool, constipation, diarrhea, nausea and rectal pain.   Genitourinary:  Negative for dyspareunia, dysuria, flank pain, frequency, genital sores, menstrual problem, pelvic pain, urgency, vaginal bleeding, vaginal discharge and vaginal pain.   Musculoskeletal:  Negative for arthralgias and back pain.   Skin:  Negative for rash.   Neurological:  Negative for weakness and headaches.          Objective      /76 (BP Location: Left arm, Patient Position: Sitting, Cuff Size: Standard)   Ht 5' 3\" (1.6 m)   Wt 79.4 kg (175 lb)   LMP 2025 (Exact Date)   BMI 31.00 kg/m²   Physical Exam  Vitals and nursing note reviewed.   Constitutional:       Appearance: Normal appearance.   HENT:      Head: Normocephalic.   Eyes:      Conjunctiva/sclera: Conjunctivae normal. "   Cardiovascular:      Rate and Rhythm: Normal rate and regular rhythm.      Heart sounds: Normal heart sounds.   Pulmonary:      Effort: Pulmonary effort is normal.      Breath sounds: Normal breath sounds.   Chest:   Breasts:     Right: Normal. No inverted nipple, mass, nipple discharge, skin change or tenderness.      Left: Normal. No inverted nipple, mass, nipple discharge, skin change or tenderness.   Abdominal:      General: Abdomen is flat.      Palpations: Abdomen is soft. There is no mass.      Tenderness: There is no abdominal tenderness. There is no right CVA tenderness or left CVA tenderness.   Genitourinary:     General: Normal vulva.      Exam position: Lithotomy position.      Pubic Area: No rash or pubic lice.       Labia:         Right: No rash or tenderness.         Left: No rash or tenderness.       Urethra: No prolapse or urethral pain.      Vagina: Normal. No vaginal discharge.      Cervix: Normal.      Uterus: Normal.       Adnexa: Right adnexa normal and left adnexa normal.        Right: No mass or tenderness.          Left: No mass or tenderness.        Comments: Mild vulvar erythema.   Musculoskeletal:         General: Normal range of motion.      Cervical back: Normal range of motion. No tenderness.      Right lower leg: No edema.      Left lower leg: No edema.   Lymphadenopathy:      Cervical: No cervical adenopathy.      Upper Body:      Right upper body: No supraclavicular or axillary adenopathy.      Left upper body: No supraclavicular or axillary adenopathy.      Lower Body: No right inguinal adenopathy. No left inguinal adenopathy.   Skin:     General: Skin is warm and dry.      Findings: No rash.   Neurological:      Mental Status: She is alert and oriented to person, place, and time.   Psychiatric:         Mood and Affect: Mood normal.         Behavior: Behavior normal.         Thought Content: Thought content normal.         Judgment: Judgment normal.                  Assessment/Plan  Problem List Items Addressed This Visit       Irregular menses    Relevant Medications    drospirenone-ethinyl estradiol (LIGIA) 3-0.03 MG per tablet     Other Visit Diagnoses         Well woman exam    -  Primary      Routine screening for STI (sexually transmitted infection)        Relevant Orders    Chlamydia/GC amplified DNA by PCR      Vulvar irritation        Relevant Medications    nystatin-triamcinolone (MYCOLOG-II) ointment            Reviewed gyn concerns today. Mycolog ordered for vulvar irritation. Mild noted on exam today.  Due to prolonged and irregular cycles, increased BC dose. Reviewed VTE risk.   She has history of uterine fibroids. Advised to try Ligia for 3 months. If still irregular, would advise TVUS.  Birth control: LAVELLE  Cervical cancer screening: deferred  Breast Cancer screening: age 40  Colon cancer Screening: age 44  STD screening: desires  Reviewed healthy lifestyle and safe sex practices  RTO for annual exam or PRN      ARLET Pablo  OB/GYN  4/7/2025  1:51 PM

## 2025-04-07 ENCOUNTER — ANNUAL EXAM (OUTPATIENT)
Dept: OBGYN CLINIC | Facility: CLINIC | Age: 35
End: 2025-04-07
Payer: COMMERCIAL

## 2025-04-07 VITALS
HEIGHT: 63 IN | DIASTOLIC BLOOD PRESSURE: 76 MMHG | BODY MASS INDEX: 31.01 KG/M2 | SYSTOLIC BLOOD PRESSURE: 120 MMHG | WEIGHT: 175 LBS

## 2025-04-07 DIAGNOSIS — N92.6 IRREGULAR MENSES: ICD-10-CM

## 2025-04-07 DIAGNOSIS — N90.89 VULVAR IRRITATION: ICD-10-CM

## 2025-04-07 DIAGNOSIS — Z11.3 ROUTINE SCREENING FOR STI (SEXUALLY TRANSMITTED INFECTION): ICD-10-CM

## 2025-04-07 DIAGNOSIS — Z01.419 WELL WOMAN EXAM: Primary | ICD-10-CM

## 2025-04-07 PROCEDURE — 99395 PREV VISIT EST AGE 18-39: CPT

## 2025-04-07 PROCEDURE — 87491 CHLMYD TRACH DNA AMP PROBE: CPT

## 2025-04-07 PROCEDURE — 87591 N.GONORRHOEAE DNA AMP PROB: CPT

## 2025-04-07 RX ORDER — DROSPIRENONE AND ETHINYL ESTRADIOL 0.03MG-3MG
1 KIT ORAL DAILY
Qty: 84 TABLET | Refills: 3 | Status: SHIPPED | OUTPATIENT
Start: 2025-04-07

## 2025-04-07 RX ORDER — NYSTATIN AND TRIAMCINOLONE ACETONIDE 100000; 1 [USP'U]/G; MG/G
OINTMENT TOPICAL 2 TIMES DAILY
Qty: 30 G | Refills: 0 | Status: SHIPPED | OUTPATIENT
Start: 2025-04-07

## 2025-04-08 ENCOUNTER — RESULTS FOLLOW-UP (OUTPATIENT)
Dept: OBGYN CLINIC | Facility: CLINIC | Age: 35
End: 2025-04-08

## 2025-04-08 LAB
C TRACH DNA SPEC QL NAA+PROBE: NEGATIVE
N GONORRHOEA DNA SPEC QL NAA+PROBE: NEGATIVE

## 2025-05-12 ENCOUNTER — TELEPHONE (OUTPATIENT)
Dept: BARIATRICS | Facility: CLINIC | Age: 35
End: 2025-05-12

## 2025-05-12 ENCOUNTER — OFFICE VISIT (OUTPATIENT)
Age: 35
End: 2025-05-12
Payer: COMMERCIAL

## 2025-05-12 VITALS
HEIGHT: 63 IN | TEMPERATURE: 97.3 F | BODY MASS INDEX: 31.4 KG/M2 | WEIGHT: 177.2 LBS | DIASTOLIC BLOOD PRESSURE: 84 MMHG | HEART RATE: 89 BPM | SYSTOLIC BLOOD PRESSURE: 128 MMHG

## 2025-05-12 DIAGNOSIS — E66.811 OBESITY (BMI 30.0-34.9): Primary | ICD-10-CM

## 2025-05-12 DIAGNOSIS — E61.1 IRON DEFICIENCY: ICD-10-CM

## 2025-05-12 DIAGNOSIS — N92.6 IRREGULAR MENSES: ICD-10-CM

## 2025-05-12 PROCEDURE — 99214 OFFICE O/P EST MOD 30 MIN: CPT | Performed by: PHYSICIAN ASSISTANT

## 2025-05-12 RX ORDER — TIRZEPATIDE 2.5 MG/.5ML
2.5 INJECTION, SOLUTION SUBCUTANEOUS WEEKLY
Qty: 2 ML | Refills: 0 | Status: SHIPPED | OUTPATIENT
Start: 2025-05-12 | End: 2025-06-09

## 2025-05-12 NOTE — TELEPHONE ENCOUNTER
PA for Zepbound 2.5 mg SUBMITTED to [facility]    via    [x]CMM-KEY: [id number]  []Surescripts-Case ID #   []Availity-Auth ID # NDC #   []Faxed to plan   []Other website   []Phone call Case ID #     []PA sent as URGENT    All office notes, labs and other pertaining documents and studies sent. Clinical questions answered. Awaiting determination from insurance company.     Turnaround time for your insurance to make a decision on your Prior Authorization can take 7-21 business days.

## 2025-05-12 NOTE — PROGRESS NOTES
Assessment/Plan:  Stephanie was seen today for follow-up.    Diagnoses and all orders for this visit:    Obesity (BMI 30.0-34.9)    Irregular menses    Iron deficiency        MWM start 177 lbs BMI 31.39 (5/12/25)  Goal: 145-150 lbs    - Weight not at goal  - Patient is interested in Conservative Program  - Labs reviewed: As below.    General Recommendations:  Nutrition:  Eat breakfast daily.  Do not skip meals.     Food log (ie.) www.myfitnesspal.com, sparkpeople.com, loseit.com, calorieking.com, etc.    Practice mindful eating.  Be sure to set aside time to eat, eat slowly, and savor your food.    Hydration:    At least 64oz of water daily.  No sugar sweetened beverages.  No juice (eat the fruit instead).    Exercise:  Studies have shown that the ideal exercise goal is somewhere between 150 to 300 minutes of moderate intensity exercise a week.  Start with exercising 10 minutes every other day and gradually increase physical activity with a goal of at least 150 minutes of moderate intensity exercise a week, divided over at least 3 days a week.  An example of this would be exercising 30 minutes a day, 5 days a week.  Resistance training can increase muscle mass and increase our resting metabolic rate.   FULL BODY resistance training is recommended 2-3 times a week.  Do not do this on consecutive days to allow for muscle recovery.    Aim for a bare minimum 5000 steps, even on days you do not exercise.    Monitoring:   Weigh yourself daily.  If this causes undue stress, then just weigh yourself once a week.  Weigh yourself the same time of the day with the same amount of clothing on.  Preferably this should be done after waking up, before you eat, and with no clothing or minimal clothing on.    Return visit:    Calorie tracking/deficit - 7719-2253 rudi/day. 80-90g protein   RD menu planning  Physical activity goals - will start with step count  SW visit - a lot of food related anxiety secondary to prior episode of  "allergic reaction to food.   AOM  Contraception: OCP + condoms   Previous adverse side effects with qsymia + contrave   Patient will clarify with aunt about subtype of thyroid cancer. Assuming not medullary, will trial GLP1 RA  Reviewed zepbound. Use and side effect profile reviewed  RTC: 4 months       ______________________________________________________      Subjective:   Chief Complaint   Patient presents with    Follow-up     MWM F/U---Waist: 41.5in     HPI: Stephanie Moses  is a 35 y.o. female with history of hyperinsulinemia, transaminitis, iron deficiency, and excess weight, here to pursue weight loss management.  Previous notes and records have been reviewed.    Previously tried qsymia. Phenterimne gave her irritability and mood changes.   Previously tried contrave   Previously tried metformin with significant diarrhea.     Chronic back and neck pain.   Underwent a breast reduction to help.       Food allergies - \"oral allergy syndrome\" pineapple, watermelon, lettuce, cucumber, orange juice, grape/juice. Patient reports previously seeing an allergist. Takes zyrtex daily and had benadryl + epipen PRN.   - reports pretty significant emotional trauma in regards to allergies to certain foods. Had an episode of anaphylaxis in the past. Scared to try new foods.     Diets - high fiber, low calorie, low carb.     HPI  Wt Readings from Last 20 Encounters:   05/12/25 80.4 kg (177 lb 3.2 oz)   04/07/25 79.4 kg (175 lb)   12/17/24 78 kg (172 lb)   08/21/24 77.6 kg (171 lb)   04/22/24 85.7 kg (189 lb)   03/01/24 83 kg (183 lb)   02/19/24 82.8 kg (182 lb 9.6 oz)   02/14/24 81.2 kg (179 lb)   01/11/24 81.6 kg (179 lb 12.8 oz)   11/06/23 84.4 kg (186 lb)   07/13/23 84.5 kg (186 lb 6 oz)   06/08/23 83.6 kg (184 lb 6 oz)   02/28/23 87.1 kg (192 lb)   05/17/22 90.7 kg (200 lb)   03/14/22 91.6 kg (202 lb)   02/08/22 92.1 kg (203 lb)   09/02/20 90.7 kg (200 lb)   08/01/19 88.4 kg (194 lb 12.8 oz)   03/15/18 81.6 kg (180 " lb)   05/31/16 87.1 kg (192 lb)     Excess Weight:  Highest weight: 203  Goal: 150  What has been tried: Diet and Exercise  Low carb diet  Increase fruit and vegetable intake.  Phentermine - insomnia (37.5mg dose)  Topiramate (while also on phentermine - Dr. Baker)  Metformin - not sure why it was stopped.    Contributing factors: Poor Food Choices and Insufficient Physical Activity    Hunger/Cravings: Salty.  Rice.  Dining out:   Twice a week  Hydration:  Water 64+    Soda 20 oz a day (stopped and switched to selter - 3 months ago)  Alcohol:    3-4 hard seltzer drinks a month  Smoking:  No  Exercise:   No  Weight Monitoring:    No  Sleep:   6 hours a night  STOP-BANG Score:  1/8  Occupation:  Home health aid    Past Medical History:   Diagnosis Date    Allergic     Anemia     Anxiety     Asthma     with allergies/sickness    Chronic pain     neck    Depression     Diabetes mellitus (HCC)     pre-no medicine since 2022    Eating disorder     Enlarged thyroid     Obesity     Uterine fibroid     Visual impairment      Patient denies personal and family history of pancreatitis, MEN-2 tumors.   Denies any hx of glaucoma, seizures, kidney stones, gallstones.  Denies Hx of CAD, PAD, arrhythmia. + palpitations  Denies uncontrolled depression, suicidal behavior or thinking , insomnia or sleep disturbance. + intermittent/situational anxiety    Past Surgical History:   Procedure Laterality Date    COSMETIC SURGERY  May 6 2024    SD BREAST REDUCTION Bilateral 05/06/2024    Procedure: BILATERAL BREAST REDUCATION;  Surgeon: Fatuma Patrick DO;  Location:  MAIN OR;  Service: Plastics    SD GRAFTING OF AUTOLOGOUS FAT BY LIPO 50 CC OR LESS Right 12/17/2024    Procedure: RIGHT AREOLA RECON WITH GRAFTING FROM LEFT AREOLA COMPLEX, FAT TRANSFER TO RIGHT CHEST SCAR;  Surgeon: Fatuma Patrick DO;  [facility]: [facility];  Service: Plastics    TOOTH EXTRACTION  2005    wisdom     The following portions of the patient's  "history were reviewed and updated as appropriate: allergies, current medications, past family history, past medical history, past social history, past surgical history, and problem list.    Review Of Systems:  Review of Systems   Constitutional:  Negative for activity change, appetite change, fatigue and fever.   Respiratory:  Negative for cough and shortness of breath.    Cardiovascular:  Positive for palpitations (occasional). Negative for chest pain and leg swelling.   Gastrointestinal:  Negative for abdominal pain, constipation, diarrhea, nausea and vomiting.   Endocrine: Negative for cold intolerance and heat intolerance.   Genitourinary:  Negative for difficulty urinating and dysuria.   Musculoskeletal:  Negative for arthralgias, back pain and gait problem.   Skin:  Negative for pallor and rash.   Neurological:  Negative for headaches.   Psychiatric/Behavioral:  Negative for dysphoric mood, sleep disturbance and suicidal ideas (or HI). The patient is nervous/anxious (intermittent).      Objective:  /84   Pulse 89   Temp (!) 97.3 °F (36.3 °C) (Tympanic)   Ht 5' 3\" (1.6 m)   Wt 80.4 kg (177 lb 3.2 oz)   BMI 31.39 kg/m²   Physical Exam  Vitals and nursing note reviewed.   Constitutional:       General: She is not in acute distress.     Appearance: Normal appearance. She is obese. She is not ill-appearing, toxic-appearing or diaphoretic.   HENT:      Head: Normocephalic and atraumatic.   Eyes:      General:         Right eye: No discharge.         Left eye: No discharge.      Conjunctiva/sclera: Conjunctivae normal.   Pulmonary:      Effort: Pulmonary effort is normal. No respiratory distress.   Musculoskeletal:         General: Normal range of motion.      Cervical back: Normal range of motion and neck supple. No rigidity or tenderness.      Right lower leg: No edema.      Left lower leg: No edema.   Skin:     Coloration: Skin is not pale.      Findings: No erythema or rash.   Neurological:      " General: No focal deficit present.      Mental Status: She is alert.   Psychiatric:      Comments: Tearful throughout examination         Labs and Imaging  Recent labs and imaging have been personally reviewed.  Lab Results   Component Value Date    WBC 7.01 12/06/2024    HGB 10.1 (L) 12/06/2024    HCT 33.6 (L) 12/06/2024    MCV 83 12/06/2024     12/06/2024     Lab Results   Component Value Date     10/23/2014    SODIUM 140 12/06/2024    K 4.1 12/06/2024     12/06/2024    CO2 25 12/06/2024    ANIONGAP 8 10/23/2014    AGAP 8 12/06/2024    BUN 12 12/06/2024    CREATININE 0.88 12/06/2024    GLUC 123 02/19/2024    GLUF 99 12/06/2024    CALCIUM 8.7 12/06/2024    AST 13 04/23/2024    ALT 12 04/23/2024    ALKPHOS 74 04/23/2024    TP 6.9 04/23/2024    TBILI 0.23 04/23/2024    EGFR 85 12/06/2024     Lab Results   Component Value Date    HGBA1C 5.3 02/19/2024     Lab Results   Component Value Date    XZV8YKQVRVLF 2.669 04/08/2023    TSH 1.82 09/17/2020     Lab Results   Component Value Date    CHOLESTEROL 162 04/23/2024     Lab Results   Component Value Date    HDL 51 04/23/2024     Lab Results   Component Value Date    TRIG 130 04/23/2024     Lab Results   Component Value Date    LDLCALC 85 04/23/2024

## 2025-05-13 ENCOUNTER — PATIENT MESSAGE (OUTPATIENT)
Dept: BARIATRICS | Facility: CLINIC | Age: 35
End: 2025-05-13

## 2025-05-13 NOTE — TELEPHONE ENCOUNTER
[place] for Zepbound 2.5 mg APPROVED     Date(s) approved 5/12/2025-11/12/2025    Case #    Patient advised by          [x]MyChart Message  []Phone call   []LMOM  []L/M to call office as no active Communication consent on file  []Unable to leave detailed message as VM not approved on Communication consent       Pharmacy advised by    [x]Fax  []Phone call  []Secure Chat    Specialty Pharmacy    []     Approval letter scanned into Media Yes

## 2025-05-30 DIAGNOSIS — Z30.016 ENCOUNTER FOR INITIAL PRESCRIPTION OF TRANSDERMAL PATCH HORMONAL CONTRACEPTIVE DEVICE: Primary | ICD-10-CM

## 2025-05-30 RX ORDER — NORELGESTROMIN AND ETHINYL ESTRADIOL 35; 150 UG/MG; UG/MG
1 PATCH TRANSDERMAL WEEKLY
Qty: 9 PATCH | Refills: 0 | Status: SHIPPED | OUTPATIENT
Start: 2025-05-30

## 2025-06-03 ENCOUNTER — CLINICAL SUPPORT (OUTPATIENT)
Dept: BARIATRICS | Facility: CLINIC | Age: 35
End: 2025-06-03

## 2025-06-03 DIAGNOSIS — E66.811 OBESITY (BMI 30.0-34.9): Primary | ICD-10-CM

## 2025-06-03 DIAGNOSIS — R63.5 ABNORMAL WEIGHT GAIN: Primary | ICD-10-CM

## 2025-06-03 PROCEDURE — RECHECK

## 2025-06-03 RX ORDER — TIRZEPATIDE 5 MG/.5ML
5 INJECTION, SOLUTION SUBCUTANEOUS WEEKLY
Qty: 2 ML | Refills: 2 | Status: SHIPPED | OUTPATIENT
Start: 2025-06-03 | End: 2025-08-26

## 2025-06-03 NOTE — PROGRESS NOTES
"Patient's name and  were verified during visit.  Patient informed and aware that virtual visits are HIPPA compliant and to further protect their confidentiality the provider was alone and the office door was closed.  Patient consented to the virtual video visit. This visit is free.    Patient presents for 30 minute Behavioral Health consult as a part of Medical Weight Management Program.    Eating behaviors/food choices: patient reports stress with eating any kind of fruit or vegetable due to development of food allergies as she's gotten older. She did go for a blood test back in  that indicated she has allergies to different vegetables like lettuce, cucumbers, etc, she gets tingling in her mouth with some fruits, had anaphylaxis after drinking Welches grape juice which really scared her. She had more allergy testing done recently, the skin test, which confirmed crop allergies but not able to distinguish between certain kinds such as what kinds of lettuce. She wants to \"eat clean\" but feels she can't have most fruits and veggies, she feels her protein intake is very good but also getting more carbs than likely needed. Support and reassurance offered that nutrients can be met with food allergies, it may take knowledge and creativity of her diet. Suggested patient start food logging, either writing down or taking pictures of what she knows she doesn't tolerate and her reactions, start logging her foods for her RD visit tomorrow. Patient feels this will be a really helpful tool to help her figure out her food intolerances.    Activity/Exercise:  Patient goes to the gym 2-4 days per week, she is focused primarily on cardio currently. She had a breast reduction late last year that she is still healing from, trying to avoid heavy lifting until she is better. She reports exercise is a really good coping skill.    Sleep/Rest:  No issues with sleep reported.    Mental Health/Wellness:  Patient denies any chronic mood " disorders, she's mostly stressed with trying to figure out what foods she can eat for best health outcomes. She denies any emotional or stress eating, she reports she doesn't really snack, she doesn't skip meals unless she is upset. She reports a lack of appetite if she is stressed, discussed the impact on weight and health if skipping or delaying meals, encouraged to be mindful. Patient has seen a therapist in the past and found it to be helpful, currently feels she is managing her mental health well. She is hopeful that she can manage her weight and food intolerances with Weight Management support and in meeting with RD tomorrow she will have a better plan going forward.    Next Appointment:  with RD on 6/4. Patient completed her initial BH visit in 30mins, she's aware that another 60min BH visit is available free of charge and can be used at any time.

## 2025-06-04 ENCOUNTER — TELEPHONE (OUTPATIENT)
Dept: BARIATRICS | Facility: CLINIC | Age: 35
End: 2025-06-04

## 2025-06-05 NOTE — PROGRESS NOTES
"Weight Management Medical Nutrition Assessment    Name was verified by patient stating name? Yes   verified by patient stating? Yes  Verified the patient is alone? Yes  Offered patient a live visit but are now consenting to this virtual visit? Yes  Verified with the patient this visit will go towards their pre paid charges OR they will be contacted after the visit to collect payment? Yes   Verified with the patient they are in the state of [place]? Yes     Stephanie presented for a meal planning session. Today's weight is 165#, down 14# since initial.  Recently started on zepbound, is on 5mg dose. Recently had a breast reduction to help her be more active. Has started to stop eating when full and controlling her portions. Reviewed protein and carbohydrate goal and provided suggestions on lean protein and healthy carb choices. Suffers from \"severe stomach cramping\", does not see GI. Generally eats the same things that she knows she doesn't have a reaction to and is hesitant to eating new foods. Wants to expand her food choices. Does not keep a food and symptoms journal. Stephanie is going to pursue meeting with GI for her GI symptoms and will f/u with MWM RD as needed.      Patient seen by Medical Provider in past 6 months:  yes  Requested to schedule appointment with Medical Provider: No      Anthropometric Measurements  Start Weight (#) & Date: 179 (24)  Current Weight (#): 165 (reported weight)  TBW % Change from start weight:7.8  Ideal Body Weight (#):110  Goal Weight (#):150  Highest: 203  Lowest: unknown    Weight Loss History  Previous weight loss attempts: Diet and Exercise  Low carb diet  Increase fruit and vegetable intake.  Phentermine - insomnia (37.5mg dose)  Topiramate (while also on phentermine - Dr. Baker)  Metformin - not sure why it was stopped.    Food and Nutrition Related History  Wake up:   not assessed  Bed Time:   Occupation: Home health aid     Food Recall  Breakfast: HB eggs OR protein " "shakes     Snack:   Lunch: greek yogurt flip OR 2 pickles, cheese and meat roll ups  Snack:   Dinner: baked chicken, broccoli or vegetable medley, small portion of rice  Snack:     Beverages: drinks lots of water with liquid IV  Alcohol:  3-4 hard seltzer drinks a month     Weekends: not assessed  Cravings: Salty. Rice.   Trouble area of day:none reported    Frequency of Eating out: Twice a week   Food restrictions:\"oral allergy syndrome\" pineapple, watermelon, lettuce, cucumber, orange juice, grape/juice. Patient reports previously seeing an allergist. Reports high fiber foods such as lettuce cause \"severe stomach cramping\".   Cooking: self   Food Shopping: self    Physical Activity Intake  Activity: starting to be more active since breast reduction  Physical limitations/barriers to exercise: none disclosed    Estimated Needs  Energy  SECA: BMR:n/a      X 1.3 -1000 =  [place] [employer] Energy Needs: BMR : 1392   0.5-1# loss weekly sedentary:  6885-1251             0.5-1# loss weekly lightly active:0160-5688  Maintenance calories for sedentary activity level: 1670  Protein:60-75      (1.2-1.5g/kg IBW)  Fluid Requirement Calculator   Total Fluid: 88   oz  (Hills-Segar Method)  Free Fluid: 70  oz (Hills-Segar Method - 20%)    Nutrition Diagnosis  Yes;    Overweight/obesity  related to Excess energy intake as evidenced by  BMI more than normative standard for age and sex (obesity-grade I 30-34.9)       Nutrition Intervention    Nutrition Prescription  Calories:   Protein:   Fluid:     Meal Plan (Deshaun/Pro/Carb) - visit focused on answering pt's questions  Breakfast:  Snack:  Lunch:  Snack:  Dinner:  Snack:    Nutrition Education:    Calorie controlled menu  Lean protein food choices  Healthy snack options  Food journaling tips      Nutrition Counseling:  Strategies: meal planning, portion sizes, healthy snack choices, hydration, fiber intake, protein intake, exercise, food journal      Monitoring and " Evaluation:  Evaluation criteria:  Energy Intake  Meet protein needs  Maintain adequate hydration  Monitor weekly weight  Meal planning/preparation  Food journal   Decreased portions at mealtimes and snacks  Physical activity     Barriers to learning:none  Readiness to change: Preparation:  (Getting ready to change)   Comprehension: good  Expected Compliance: good

## 2025-06-12 ENCOUNTER — CLINICAL SUPPORT (OUTPATIENT)
Dept: BARIATRICS | Facility: CLINIC | Age: 35
End: 2025-06-12

## 2025-06-12 VITALS — HEIGHT: 62 IN | WEIGHT: 165 LBS | BODY MASS INDEX: 30.36 KG/M2

## 2025-06-12 DIAGNOSIS — R63.5 ABNORMAL WEIGHT GAIN: Primary | ICD-10-CM

## 2025-06-12 PROCEDURE — WMDI30

## 2025-06-12 PROCEDURE — RECHECK

## 2025-06-23 ENCOUNTER — OFFICE VISIT (OUTPATIENT)
Age: 35
End: 2025-06-23
Payer: COMMERCIAL

## 2025-06-23 DIAGNOSIS — Z09 AFTERCARE INVOLVING THE USE OF PLASTIC SURGERY: Primary | ICD-10-CM

## 2025-06-23 DIAGNOSIS — Z98.890 S/P BILATERAL BREAST REDUCTION: ICD-10-CM

## 2025-06-23 PROCEDURE — 99212 OFFICE O/P EST SF 10 MIN: CPT

## 2025-06-23 RX ORDER — GABAPENTIN 100 MG/1
100 CAPSULE ORAL 3 TIMES DAILY PRN
Qty: 90 CAPSULE | Refills: 0 | Status: SHIPPED | OUTPATIENT
Start: 2025-06-23 | End: 2025-07-23

## 2025-06-23 NOTE — PROGRESS NOTES
"[facility]  [phone]    Patient Identification: Stephanie Moses is a 35 y.o. female     History of Present Illness: The patient is a 35 y.o.  year-old female  who presents to the office for 6 month post op. Patient is 188 days s/p Right Areola Recon With Grafting From Left Areola Complex, Fat Transfer To Right Chest Scar - Right  on 12/17/2024 by Dr. Patrick. Patient reports still getting some \"stabbing\" and \"zing\" pain in right breast. She notes intermittent soreness of her breasts as well which seem to be worse with weather changes. She reports taking tylenol and ibuprofen as needed which helps. She has continued to massage breasts/incisions daily and moisturizing.     She has returned to the gym and continues to work on her overall health and fitness. She reports feeling well taken care of throughout this whole process and is happy with her results.      Past Medical History[1]       Review of Systems  Constitutional: Denies fevers, chills.  Breast: + pain  R > L  Skin: Denies any warmth, erythema, or drainage.     Physical Exam  General: AAOx3, NAD, well appearing  Breasts: Right breast with small areas of hypertropic scarring of horizontal incision. No palpable fat necrosis. See media      Assessment and Plan:  The patient is an 35 y.o.  year-old female who presents to the office for 6 month post op. Patient is 188 days s/p Right Areola Recon With Grafting From Left Areola Complex, Fat Transfer To Right Chest Scar - Right  on 12/17/2024 by Dr. Patrick.    Dr. Patrick present at today's visit and in agreement with the assessment and plan.    -At today's visit Dr. Patrick discussed should there be a need for any scar revision or tweaks that can be decided at the 1 year laith  -Rx for gabapentin 100 mg TID PRN sent for patient  -Continue to moisturize daily  -Continue scar massage of breasts/incisions  -The patient is to return in 6 months for a 1 year post op and to discuss " next steps.  -The patient is to call the office with any questions or concerns. All of the patient's questions were answered at this time and they agree with the plan of care.      Leanna Tapia PA-C  [facility]           [1]   Past Medical History:  Diagnosis Date    Allergic     Anemia     Anxiety     Asthma     with allergies/sickness    Chronic pain     neck    Depression     Diabetes mellitus (HCC)     pre-no medicine since 2022    Eating disorder     Enlarged thyroid     Obesity     Uterine fibroid     Visual impairment

## 2025-07-06 DIAGNOSIS — Z76.0 MEDICATION REFILL: ICD-10-CM

## 2025-07-07 DIAGNOSIS — R11.0 NAUSEA: Primary | ICD-10-CM

## 2025-07-07 RX ORDER — ONDANSETRON 4 MG/1
4 TABLET, ORALLY DISINTEGRATING ORAL EVERY 6 HOURS PRN
Qty: 30 TABLET | Refills: 0 | Status: SHIPPED | OUTPATIENT
Start: 2025-07-07

## 2025-07-07 RX ORDER — ALBUTEROL SULFATE 90 UG/1
INHALANT RESPIRATORY (INHALATION)
Qty: 18 G | Refills: 0 | Status: SHIPPED | OUTPATIENT
Start: 2025-07-07

## 2025-07-15 ENCOUNTER — VBI (OUTPATIENT)
Dept: ADMINISTRATIVE | Facility: OTHER | Age: 35
End: 2025-07-15

## 2025-07-30 DIAGNOSIS — Z98.890 S/P BILATERAL BREAST REDUCTION: ICD-10-CM

## 2025-08-01 RX ORDER — GABAPENTIN 100 MG/1
CAPSULE ORAL
Qty: 90 CAPSULE | Refills: 5 | Status: SHIPPED | OUTPATIENT
Start: 2025-08-01

## 2025-08-03 DIAGNOSIS — Z76.0 MEDICATION REFILL: ICD-10-CM

## 2025-08-05 RX ORDER — ALBUTEROL SULFATE 90 UG/1
INHALANT RESPIRATORY (INHALATION)
Qty: 18 G | Refills: 3 | Status: SHIPPED | OUTPATIENT
Start: 2025-08-05

## 2025-08-22 DIAGNOSIS — E66.811 OBESITY (BMI 30.0-34.9): ICD-10-CM

## 2025-08-22 RX ORDER — TIRZEPATIDE 5 MG/.5ML
INJECTION, SOLUTION SUBCUTANEOUS
Qty: 2 ML | Refills: 0 | Status: SHIPPED | OUTPATIENT
Start: 2025-08-22

## (undated) DEVICE — SYRINGE 30ML LL

## (undated) DEVICE — NEEDLE 23G X 1 1/2 SAFETY-GLIDE THIN WALL

## (undated) DEVICE — SYRINGE 5ML LL

## (undated) DEVICE — CHLORAPREP HI-LITE 26ML ORANGE

## (undated) DEVICE — INTENDED FOR TISSUE SEPARATION, AND OTHER PROCEDURES THAT REQUIRE A SHARP SURGICAL BLADE TO PUNCTURE OR CUT.: Brand: BARD-PARKER ® CARBON RIB-BACK BLADES

## (undated) DEVICE — PAD GROUNDING DUAL ADULT

## (undated) DEVICE — NEPTUNE E-SEP SMOKE EVACUATION PENCIL, COATED, 70MM BLADE, PUSH BUTTON SWITCH: Brand: NEPTUNE E-SEP

## (undated) DEVICE — SPONGE LAP 18 X 18 IN STRL RFD

## (undated) DEVICE — INTENDED FOR TISSUE SEPARATION, AND OTHER PROCEDURES THAT REQUIRE A SHARP SURGICAL BLADE TO PUNCTURE OR CUT.: Brand: BARD-PARKER ® SAFETYLOCK CARBON RIB-BACK BLADES

## (undated) DEVICE — ARGYLE YANKAUER BULB TIP, NO VENT WITH TUBING 1/4” X 6’ (6 MM X 1.8 M): Brand: ARGYLE

## (undated) DEVICE — DRAPE SHEET THREE QUARTER

## (undated) DEVICE — Device: Brand: REVOLVE ADVANCED ADIPOSE SYSTEM

## (undated) DEVICE — PROXIMATE SKIN STAPLERS (35 WIDE) CONTAINS 35 STAINLESS STEEL STAPLES (FIXED HEAD): Brand: PROXIMATE

## (undated) DEVICE — NEEDLE BLUNT 18 G X 1 1/2IN

## (undated) DEVICE — BULB SYRINGE,IRRIGATION WITH PROTECTIVE CAP: Brand: DOVER

## (undated) DEVICE — TUBING SUCTION 5MM X 12 FT

## (undated) DEVICE — SKIN MARKER DUAL TIP WITH RULER CAP, FLEXIBLE RULER AND LABELS: Brand: DEVON

## (undated) DEVICE — OCCLUSIVE GAUZE STRIP,3% BISMUTH TRIBROMOPHENATE IN PETROLATUM BLEND: Brand: XEROFORM

## (undated) DEVICE — DISPOSABLE OR TOWEL: Brand: CARDINAL HEALTH

## (undated) DEVICE — SUT PDS II 4-0 PS-2 18 IN Z496G

## (undated) DEVICE — LIGHT GLOVE GREEN

## (undated) DEVICE — PACK UNIVERSAL DRAPES SUB-Q ICD

## (undated) DEVICE — STERILE POLYISOPRENE POWDER-FREE SURGICAL GLOVES: Brand: PROTEXIS

## (undated) DEVICE — STERILE POLYISOPRENE POWDER-FREE SURGICAL GLOVES WITH EMOLLIENT COATING: Brand: PROTEXIS

## (undated) DEVICE — STANDARD SURGICAL GOWN, L: Brand: CONVERTORS

## (undated) DEVICE — BETHLEHEM UNIVERSAL BREAST PK: Brand: CARDINAL HEALTH

## (undated) DEVICE — COTTON ROLL,1 LB: Brand: CURITY

## (undated) DEVICE — ELECTRODE EZ CLEAN BLADE -0012

## (undated) DEVICE — SCD SEQUENTIAL COMPRESSION COMFORT SLEEVE MEDIUM KNEE LENGTH: Brand: KENDALL SCD

## (undated) DEVICE — STAPLER INSORB SUBCUTICULAR 30 SINGLE USE

## (undated) DEVICE — SYRINGE 10ML LL

## (undated) DEVICE — SUT MONOCRYL 3-0 SH 27 IN Y416H

## (undated) DEVICE — 1820 FOAM BLOCK NEEDLE COUNTER: Brand: DEVON

## (undated) DEVICE — DECANTER: Brand: UNBRANDED

## (undated) DEVICE — BASIC SINGLE BASIN-LF: Brand: MEDLINE INDUSTRIES, INC.

## (undated) DEVICE — PLUMEPEN PRO 10FT

## (undated) DEVICE — GLOVE SRG BIOGEL 6

## (undated) DEVICE — SUT PDS II 2-0 CT-1 27 IN Z339H

## (undated) DEVICE — GLOVE INDICATOR PI UNDERGLOVE SZ 6.5 BLUE

## (undated) DEVICE — 3M™ STERI-STRIP™ REINFORCED ADHESIVE SKIN CLOSURES, R1547, 1/2 IN X 4 IN (12 MM X 100 MM), 6 STRIPS/ENVELOPE: Brand: 3M™ STERI-STRIP™

## (undated) DEVICE — SINGLE PORT MANIFOLD: Brand: NEPTUNE 2

## (undated) DEVICE — SUT MONOCRYL 5-0 P-3 18 IN Y493G

## (undated) DEVICE — PACKING VAGINAL 2 IN

## (undated) DEVICE — EXOFIN PRECISION PEN HIGH VISCOSITY TOPICAL SKIN ADHESIVE: Brand: EXOFIN PRECISION PEN, 1G

## (undated) DEVICE — NEEDLE 21 G X 1 1/2 SAFETY

## (undated) DEVICE — CANNULA 4MM MERCEDES 22CM 10MM PORT